# Patient Record
Sex: FEMALE | Race: WHITE | NOT HISPANIC OR LATINO | Employment: OTHER | URBAN - METROPOLITAN AREA
[De-identification: names, ages, dates, MRNs, and addresses within clinical notes are randomized per-mention and may not be internally consistent; named-entity substitution may affect disease eponyms.]

---

## 2017-07-26 ENCOUNTER — HOSPITAL ENCOUNTER (OUTPATIENT)
Dept: CARDIOLOGY | Facility: MEDICAL CENTER | Age: 62
End: 2017-07-26
Attending: INTERNAL MEDICINE
Payer: COMMERCIAL

## 2017-07-26 DIAGNOSIS — I49.8 SINUS ARRHYTHMIA: ICD-10-CM

## 2017-07-26 DIAGNOSIS — R07.9 CHEST PAIN, UNSPECIFIED: ICD-10-CM

## 2017-07-26 DIAGNOSIS — E78.5 HYPERLIPIDEMIA, UNSPECIFIED HYPERLIPIDEMIA TYPE: ICD-10-CM

## 2017-07-26 LAB — LV EJECT FRACT  99904: 55

## 2017-07-26 PROCEDURE — 93306 TTE W/DOPPLER COMPLETE: CPT | Mod: 26 | Performed by: INTERNAL MEDICINE

## 2017-07-26 PROCEDURE — 93306 TTE W/DOPPLER COMPLETE: CPT

## 2017-07-31 ENCOUNTER — HOSPITAL ENCOUNTER (OUTPATIENT)
Dept: RADIOLOGY | Facility: MEDICAL CENTER | Age: 62
End: 2017-07-31
Attending: INTERNAL MEDICINE
Payer: COMMERCIAL

## 2017-07-31 DIAGNOSIS — E78.5 HYPERLIPIDEMIA, UNSPECIFIED HYPERLIPIDEMIA TYPE: ICD-10-CM

## 2017-07-31 DIAGNOSIS — I49.8 SINUS ARRHYTHMIA: ICD-10-CM

## 2017-07-31 DIAGNOSIS — R07.9 CHEST PAIN, UNSPECIFIED: ICD-10-CM

## 2017-07-31 PROCEDURE — A9502 TC99M TETROFOSMIN: HCPCS

## 2017-07-31 PROCEDURE — 700111 HCHG RX REV CODE 636 W/ 250 OVERRIDE (IP)

## 2017-07-31 RX ORDER — REGADENOSON 0.08 MG/ML
INJECTION, SOLUTION INTRAVENOUS
Status: COMPLETED
Start: 2017-07-31 | End: 2017-07-31

## 2017-07-31 RX ADMIN — REGADENOSON 0.4 MG: 0.08 INJECTION, SOLUTION INTRAVENOUS at 10:22

## 2019-10-18 ENCOUNTER — TELEPHONE (OUTPATIENT)
Dept: SCHEDULING | Facility: IMAGING CENTER | Age: 64
End: 2019-10-18

## 2019-11-14 ENCOUNTER — HOSPITAL ENCOUNTER (OUTPATIENT)
Facility: MEDICAL CENTER | Age: 64
End: 2019-11-14
Attending: PHYSICIAN ASSISTANT
Payer: COMMERCIAL

## 2019-11-14 ENCOUNTER — OFFICE VISIT (OUTPATIENT)
Dept: URGENT CARE | Facility: CLINIC | Age: 64
End: 2019-11-14
Payer: COMMERCIAL

## 2019-11-14 VITALS
WEIGHT: 113 LBS | DIASTOLIC BLOOD PRESSURE: 60 MMHG | RESPIRATION RATE: 20 BRPM | HEART RATE: 104 BPM | OXYGEN SATURATION: 98 % | SYSTOLIC BLOOD PRESSURE: 116 MMHG | TEMPERATURE: 99.2 F

## 2019-11-14 DIAGNOSIS — G89.29 CHRONIC MIDLINE LOW BACK PAIN WITHOUT SCIATICA: ICD-10-CM

## 2019-11-14 DIAGNOSIS — M54.50 CHRONIC MIDLINE LOW BACK PAIN WITHOUT SCIATICA: ICD-10-CM

## 2019-11-14 DIAGNOSIS — R15.9 INCONTINENCE OF FECES, UNSPECIFIED FECAL INCONTINENCE TYPE: ICD-10-CM

## 2019-11-14 DIAGNOSIS — N30.10 INTERSTITIAL CYSTITIS: ICD-10-CM

## 2019-11-14 DIAGNOSIS — H91.91 HEARING LOSS OF RIGHT EAR, UNSPECIFIED HEARING LOSS TYPE: ICD-10-CM

## 2019-11-14 DIAGNOSIS — R26.89 BALANCE PROBLEMS: ICD-10-CM

## 2019-11-14 LAB
APPEARANCE UR: NORMAL
BILIRUB UR STRIP-MCNC: NORMAL MG/DL
COLOR UR AUTO: YELLOW
GLUCOSE UR STRIP.AUTO-MCNC: NORMAL MG/DL
KETONES UR STRIP.AUTO-MCNC: NORMAL MG/DL
LEUKOCYTE ESTERASE UR QL STRIP.AUTO: NORMAL
NITRITE UR QL STRIP.AUTO: NORMAL
PH UR STRIP.AUTO: 5.5 [PH] (ref 5–8)
PROT UR QL STRIP: NORMAL MG/DL
RBC UR QL AUTO: NORMAL
SP GR UR STRIP.AUTO: 1.01
UROBILINOGEN UR STRIP-MCNC: 0.2 MG/DL

## 2019-11-14 PROCEDURE — 87086 URINE CULTURE/COLONY COUNT: CPT

## 2019-11-14 PROCEDURE — 99204 OFFICE O/P NEW MOD 45 MIN: CPT | Performed by: PHYSICIAN ASSISTANT

## 2019-11-14 PROCEDURE — 81002 URINALYSIS NONAUTO W/O SCOPE: CPT | Performed by: PHYSICIAN ASSISTANT

## 2019-11-14 RX ORDER — CEFDINIR 300 MG/1
300 CAPSULE ORAL EVERY 12 HOURS
Qty: 10 CAP | Refills: 0 | Status: SHIPPED | OUTPATIENT
Start: 2019-11-14 | End: 2019-11-19

## 2019-11-14 ASSESSMENT — ENCOUNTER SYMPTOMS
FALLS: 1
ABDOMINAL PAIN: 0
CHILLS: 0
FOCAL WEAKNESS: 0
DIARRHEA: 0
TINGLING: 0
FLANK PAIN: 0
DIZZINESS: 1
VOMITING: 0
SENSORY CHANGE: 0
FEVER: 0
NAUSEA: 0
BLOOD IN STOOL: 0

## 2019-11-14 ASSESSMENT — PAIN SCALES - GENERAL: PAINLEVEL: 8=MODERATE-SEVERE PAIN

## 2019-11-15 NOTE — PATIENT INSTRUCTIONS
Fecal Incontinence  Introduction  Fecal incontinence, also called accidental bowel leakage, is not being able to control your bowels. This condition happens because the nerves or muscles around the anus do not work the way they should. This affects their ability to hold stool.  What are the causes?  This condition may be caused by:  · Damage to the muscles at the end of the rectum (sphincter).  · Damage to the nerves that control bowel movements.  · Diarrhea.  · Chronic constipation.  · Pelvic floor dysfunction. This means the muscles in the pelvis do not work well.  · Loss of bowel storage capacity.  What increases the risk?  This condition is more likely to develop in people who:  · Are born with bowels or a pelvis that has not formed correctly.  · Have had rectal surgery.  · Have had radiation treatment for certain cancers.  · Have irritable bowel syndrome (IBS).  · Have an inflammatory bowel disease (IBD), such as Crohn disease.  · Have been pregnant, had a vaginal delivery, or had surgery that damaged the pelvic floor muscles.  · Have a complicated childbirth, spinal cord injury, or other trauma that causes nerve damage.  · Have a condition that can affect nerve function, such as diabetes, Parkinson disease, or multiple sclerosis.  · Have a condition where the rectum drops down into the anus or vagina (prolapse).  · Are older.  What are the signs or symptoms?  The main symptom of this condition is not being able to control your bowels. You also might not be able get to the bathroom before a bowel movement.  How is this diagnosed?  This condition is diagnosed with a medical history and physical exam. You may also have tests, including:  · Blood tests.  · Urine tests.  · A rectal exam.  · Ultrasound.  · MRI.  · Colonoscopy. This is an exam that looks at your large intestine (colon).  · Anal manometry. This is a test that measures the strength of the anal sphincter.  · Anal electromyogram (EMG). This is a test  that uses small electrodes to check for nerve damage.  How is this treated?  Treatment varies depending on the cause and severity of your condition. Treatment may also focus on addressing any underlying causes of this condition. Treatment may include:  · Medicines. This may include medicines to:  ¨ Prevent diarrhea.  ¨ Help with constipation (laxatives).  ¨ Treat any underlying conditions.  · Physical therapy.  · Fiber supplements. These can help manage your bowel movements.  · Nerve stimulation.  · Injectable gel to promote tissue growth and better muscle control.  · Surgery. You may need:  ¨ Sphincter repair surgery.  ¨ Diversion surgery. This procedure lets feces pass out of your body through a hole in your abdomen.  Follow these instructions at home:  Diet  · Follow instructions from your health care provider about any eating or drinking restrictions. Work with a dietitian to come up with a healthy diet and to help you avoid the foods that can make your condition worse. Keep a diet diary to find out which foods or drinks could be making your fecal incontinence worse.  · Drink enough fluid to keep your urine clear or pale yellow.  Lifestyle  · If you smoke, talk to your health care provider about quitting. This may help your condition.  · If you are overweight, talk to your health care provider about how to safely lose weight. This may help your condition.  · Increase your physical activity as told by your health care provider. This may help your condition. Always talk to your health care provider before starting a new exercise program.  · Carry a change of clothes and supplies to clean up quickly if you have an episode of fetal incontinence.  · Consider joining a fecal incontinence support group. You can find a support group online or in your local community.  General instructions  · Take over-the-counter and prescription medicines only as told by your health care provider. This includes any  supplements.  · Apply a moisture barrier, such as petroleum jelly, to your rectum. This protects the skin from irritation caused by ongoing leaking or diarrhea.  · Tell your health care provider if you are upset or depressed about your condition.  Where to find more information:  American Academy [employer]: www.aafp.org  International Foundation for Functional Gastrointestinal Disorders: www.iffgd.org  Contact a health care provider if:  · You have a fever.  · You have redness, swelling, or pain around your rectum.  · Your pain is getting worse or you lose feeling in your rectal area.  · You have blood in your stool.  · You feel sad or hopeless.  · You avoid social or work situations.  Get help right away if:  · You stop having bowel movements.  · You cannot eat or drink without vomiting.  · You have rectal bleeding that does not stop.  · You have severe pain that is getting worse.  · You have symptoms of dehydration, including:  ¨ Sleepiness or fatigue.  ¨ Producing little or no urine, tears, or sweat.  ¨ Dizziness.  ¨ Dry mouth.  ¨ Unusual irritability.  ¨ Headache.  ¨ Inability to think clearly.  This information is not intended to replace advice given to you by your health care provider. Make sure you discuss any questions you have with your health care provider.  Document Released: 11/29/2005 Document Revised: 05/25/2017 Document Reviewed: 05/25/2016  © 2017 [employer]vier  Interstitial Cystitis  Introduction  Interstitial cystitis is a condition that causes inflammation of the bladder. The bladder is a hollow organ in the lower part of your abdomen. It stores urine after the urine is made by your kidneys. With interstitial cystitis, you may have pain in the bladder area. You may also have a frequent and urgent need to urinate.  The severity of interstitial cystitis can vary from person to person. You may have flare-ups of the condition, and then it may go away for a while. For many people who have this  condition, it becomes a long-term problem.  What are the causes?  The cause of this condition is not known.  What increases the risk?  This condition is more likely to develop in women.  What are the signs or symptoms?  Symptoms of interstitial cystitis vary, and they can change over time. Symptoms may include:  · Discomfort or pain in the bladder area. This can range from mild to severe. The pain may change in intensity as the bladder fills with urine or as it empties.  · Pelvic pain.  · An urgent need to urinate.  · Frequent urination.  · Pain during sexual intercourse.  · Pinpoint bleeding on the bladder wall.  For women, the symptoms often get worse during menstruation.  How is this diagnosed?  This condition is diagnosed by evaluating your symptoms and ruling out other causes. A physical exam will be done. Various tests may be done to rule out other conditions. Common tests include:  · Urine tests.  · Cystoscopy. In this test, a tool that is like a very thin telescope is used to look into your bladder.  · Biopsy. This involves taking a sample of tissue from the bladder wall to be examined under a microscope.  How is this treated?  There is no cure for interstitial cystitis, but treatment methods are available to control your symptoms. Work closely with your health care provider to find the treatments that will be most effective for you. Treatment options may include:  · Medicines to relieve pain and to help reduce the number of times that you feel the need to urinate.  · Bladder training. This involves learning ways to control when you urinate, such as:  ¨ Urinating at scheduled times.  ¨ Training yourself to delay urination.  ¨ Doing exercises (Kegel exercises) to strengthen the muscles that control urine flow.  · Lifestyle changes, such as changing your diet or taking steps to control stress.  · Use of a device that provides electrical stimulation in order to reduce pain.  · A procedure that stretches your  bladder by filling it with air or fluid.  · Surgery. This is rare. It is only done for extreme cases if other treatments do not help.  Follow these instructions at home:  · Take medicines only as directed by your health care provider.  · Use bladder training techniques as directed.  ¨ Keep a bladder diary to find out which foods, liquids, or activities make your symptoms worse.  ¨ Use your bladder diary to schedule bathroom trips. If you are away from home, plan to be near a bathroom at each of your scheduled times.  ¨ Make sure you urinate just before you leave the house and just before you go to bed.  · Do Kegel exercises as directed by your health care provider.  · Do not drink alcohol.  · Do not use any tobacco products, including cigarettes, chewing tobacco, or electronic cigarettes. If you need help quitting, ask your health care provider.  · Make dietary changes as directed by your health care provider. You may need to avoid spicy foods and foods that contain a high amount of potassium.  · Limit your drinking of beverages that stimulate urination. These include soda, coffee, and tea.  · Keep all follow-up visits as directed by your health care provider. This is important.  Contact a health care provider if:  · Your symptoms do not get better after treatment.  · Your pain and discomfort are getting worse.  · You have more frequent urges to urinate.  · You have a fever.  Get help right away if:  · You are not able to control your bladder at all.  This information is not intended to replace advice given to you by your health care provider. Make sure you discuss any questions you have with your health care provider.  Document Released: 08/18/2005 Document Revised: 05/25/2017 Document Reviewed: 08/25/2015  © 2017 Elsevier

## 2019-11-15 NOTE — PROGRESS NOTES
"Subjective:   Carlee Zavala is a 63 y.o. female who presents for Bowel Problem (started with right ear hearing loss, burning, leaking from her anus, going to the bathroom frequesnt, leakage worsening, bit her tongue x 05/2019(had issues with her insurance))  With constant leaking  This is a new problem.  Patient presents to urgent care with 8-month history of complete incontinence of bowel with constant leaking of fecal material and a sensation of something \"bulging from my rectum\".  Patient has not seen anyone for this problem.  She has attempted to schedule an appointment with primary care but is unable to get an appointment for several months.    The patient also mentions other multiple chronic conditions of concern including hearing loss in the right ear with significant balance issues present for the at least the past 8 years.  She has seen multiple providers in ear nose and throat specialist regarding this issue has had an MRI and has not had an identification of her problem.    Patient reports that she also went to see her GYN for this concern. Pap smear was obtained and she was told that they did not see anything on exam concerning.     The patient also reports multiple falls due to her to her balance issues.    Patient also is complaining of pain in the left side of her abdomen when lying on her left side during today's visit and when palpating the area.    Patient reports that she has had a change of health insurance and has been unable to establish with a primary care.    Patient reports a history of interstitial cystitis and states that her physician will treat her when needed with Keflex 500 mg 4 times a day for 7 days.  She does admit to an increase in dysuria with some urgency and frequency.  She denies any urinary incontinence.    Patient denies any numbness, tingling or weakness of the extremities.    Patient does have a history of chronic back pain.  However, she denies any numbness, tingling, " weakness of the extremities.  She is previously been treated with hydrocodone and methadone however she reports that she has weaned herself off of these medications.  She is taking tizanidine 4 mg every 6 hours and is wondering if this could be contributing to her symptoms today.    Past medical history, family history and social history are reviewed and updated in the record today.       HPI  Review of Systems   Constitutional: Negative for chills, fever and malaise/fatigue.   HENT: Positive for hearing loss and tinnitus.    Gastrointestinal: Negative for abdominal pain, blood in stool, diarrhea, melena, nausea and vomiting.   Genitourinary: Positive for dysuria. Negative for flank pain, frequency, hematuria and urgency.   Musculoskeletal: Positive for falls.   Neurological: Positive for dizziness. Negative for tingling, sensory change and focal weakness.   All other systems reviewed and are negative.    Not on File     Objective:   /60 (BP Location: Left arm, Patient Position: Sitting, BP Cuff Size: Adult)   Pulse (!) 104   Temp 37.3 °C (99.2 °F) (Temporal)   Resp 20   Wt 51.3 kg (113 lb)   SpO2 98%   Physical Exam  Vitals signs reviewed.   Constitutional:       General: She is not in acute distress.     Appearance: She is well-developed. She is not ill-appearing or toxic-appearing.   HENT:      Head: Normocephalic and atraumatic. No raccoon eyes, Jc's sign or contusion.      Right Ear: Tympanic membrane, ear canal and external ear normal. No hemotympanum.      Left Ear: Tympanic membrane, ear canal and external ear normal. No hemotympanum.      Nose: Nose normal.      Mouth/Throat:      Lips: Pink. No lesions.      Mouth: Mucous membranes are moist.      Pharynx: Oropharynx is clear. Uvula midline. No oropharyngeal exudate.   Eyes:      General: Lids are normal.      Extraocular Movements: Extraocular movements intact.      Conjunctiva/sclera: Conjunctivae normal.      Pupils: Pupils are equal,  round, and reactive to light.   Neck:      Musculoskeletal: Normal range of motion and neck supple.   Cardiovascular:      Rate and Rhythm: Normal rate and regular rhythm.      Heart sounds: Normal heart sounds. No murmur. No friction rub. No gallop.    Pulmonary:      Effort: Pulmonary effort is normal. No respiratory distress.      Breath sounds: Normal breath sounds.   Abdominal:      General: Bowel sounds are normal. There is no distension.      Palpations: Abdomen is soft. There is no mass.      Tenderness: There is no tenderness. There is no guarding or rebound.   Genitourinary:     Rectum: Guaiac result negative. External hemorrhoid present. No tenderness. Abnormal anal tone.      Comments: Anal wink absent, no anal tone on exam, no stool noted in vault.  Hemocult negative  Small external hemorrhoid noted, no rectal prolapse noted. No rectocele noted.   Musculoskeletal: Normal range of motion.         General: No tenderness or deformity.   Lymphadenopathy:      Head:      Right side of head: No submental, submandibular or tonsillar adenopathy.      Left side of head: No submental, submandibular or tonsillar adenopathy.      Cervical: No cervical adenopathy.      Upper Body:      Right upper body: No supraclavicular adenopathy.      Left upper body: No supraclavicular adenopathy.   Skin:     General: Skin is warm and dry.      Findings: No rash.   Neurological:      Mental Status: She is alert and oriented to person, place, and time.      Cranial Nerves: No cranial nerve deficit.      Sensory: No sensory deficit.      Motor: Motor function is intact.      Coordination: Romberg sign positive. Coordination normal.      Gait: Gait abnormal.   Psychiatric:         Attention and Perception: Attention normal.         Mood and Affect: Mood and affect normal.         Speech: Speech normal.         Behavior: Behavior normal. Behavior is cooperative.         Thought Content: Thought content normal.         Judgment:  Judgment normal.            Assessment/Plan:   Assessment    1. Incontinence of feces, unspecified fecal incontinence type  - REFERRAL TO GASTROENTEROLOGY  - REFERRAL TO FOLLOW-UP WITH PRIMARY CARE    2. Interstitial cystitis  - REFERRAL TO FOLLOW-UP WITH PRIMARY CARE  - cefdinir (OMNICEF) 300 MG Cap; Take 1 Cap by mouth every 12 hours for 5 days.  Dispense: 10 Cap; Refill: 0  - POCT Urinalysis  - URINE CULTURE(NEW); Future    3. Balance problems  - REFERRAL TO FOLLOW-UP WITH PRIMARY CARE    4. Hearing loss of right ear, unspecified hearing loss type  - REFERRAL TO FOLLOW-UP WITH PRIMARY CARE    5. Chronic midline low back pain without sciatica    Patient is lacking rectal tone.  Consideration could be given to possible issue related to her low back with her chronic back pain.  However she is not having any numbness, tingling or weakness of her extremities.  Referral was placed to gastroenterology and to primary care for follow-up for this chronic condition.    Urinalysis does have trace leukocyte esterase.  We will send for culture for confirmation and place patient on Omnicef pending culture and sensitivity.    Regarding patient's other chronic health conditions I have recommended that she follow-up with primary care, referral is placed.        Differential diagnosis, natural history, supportive care, and indications for immediate follow-up discussed.      Red flag warning symptoms and strict ER/follow-up precautions given.  The patient demonstrated a good understanding and agreed with the treatment plan.  Please note that this note was created using voice recognition speech to text software. Every effort has been made to correct obvious errors.  However, I expect there are errors of grammar and possibly context that were not discovered prior to finalizing the note  TATYANA Ewing PA-C

## 2019-11-17 LAB
BACTERIA UR CULT: NORMAL
SIGNIFICANT IND 70042: NORMAL
SITE SITE: NORMAL
SOURCE SOURCE: NORMAL

## 2019-11-22 ENCOUNTER — TELEPHONE (OUTPATIENT)
Dept: MEDICAL GROUP | Facility: LAB | Age: 64
End: 2019-11-22

## 2019-11-22 NOTE — TELEPHONE ENCOUNTER
Pt was seen in Urgent care and was referred to Gastroenterology. They are now sending her to a surgeon. She does not come in to see you until 12/12/19. Do you need to see her sooner? New PT.   She stated they have been faxing over all her notes. I do not see them in her chart.

## 2019-11-22 NOTE — TELEPHONE ENCOUNTER
No need. Nothing I can do to treat that from my end. I do see the gastro notes in media.      Thanks  Ashlee

## 2019-11-25 ENCOUNTER — TELEPHONE (OUTPATIENT)
Dept: URGENT CARE | Facility: CLINIC | Age: 64
End: 2019-11-25

## 2019-11-25 NOTE — TELEPHONE ENCOUNTER
----- Message from Pema Ewing P.A.-C. sent at 11/17/2019 11:07 AM PST -----  Regarding: Urine culture negative  Please contact the patient and notify urine culture is negative for urinary tract infection.  Given her history of interstitial cystitis typically responsive to antibiotic use, recommend completion of the Omnicef that was prescribed.  No change to plan at this time.  TATYANA Ewing PA-C

## 2019-11-27 ENCOUNTER — TELEPHONE (OUTPATIENT)
Dept: MEDICAL GROUP | Facility: LAB | Age: 64
End: 2019-11-27

## 2019-11-27 DIAGNOSIS — K62.3 RECTAL PROLAPSE: ICD-10-CM

## 2019-11-27 NOTE — TELEPHONE ENCOUNTER
"· Referral paperwork received from Dr Soliman from [facility] requesting a referral and a provider signature to be sent to Dr Palacio using ICD10 k62.3 and cpt 32892, 69701 with medical records attached.     · All appropriate fields completed by Medical Assistant: Yes    · Paperwork placed in \"MA to Provider\" folder/basket. Awaiting provider completion/signature.      "

## 2019-12-12 ENCOUNTER — OFFICE VISIT (OUTPATIENT)
Dept: MEDICAL GROUP | Facility: LAB | Age: 64
End: 2019-12-12
Payer: COMMERCIAL

## 2019-12-12 DIAGNOSIS — M81.0 AGE RELATED OSTEOPOROSIS, UNSPECIFIED PATHOLOGICAL FRACTURE PRESENCE: ICD-10-CM

## 2019-12-12 DIAGNOSIS — Z11.59 NEED FOR HEPATITIS C SCREENING TEST: ICD-10-CM

## 2019-12-12 DIAGNOSIS — A60.00 HERPES SIMPLEX INFECTION OF GENITOURINARY SYSTEM: ICD-10-CM

## 2019-12-12 DIAGNOSIS — R29.6 FREQUENT FALLS: ICD-10-CM

## 2019-12-12 DIAGNOSIS — Z79.899 CHRONIC USE OF BENZODIAZEPINE FOR THERAPEUTIC PURPOSE: ICD-10-CM

## 2019-12-12 DIAGNOSIS — R26.89 BALANCE PROBLEM: ICD-10-CM

## 2019-12-12 DIAGNOSIS — H90.5 SENSORINEURAL HEARING LOSS (SNHL) OF LEFT EAR, UNSPECIFIED HEARING STATUS ON CONTRALATERAL SIDE: ICD-10-CM

## 2019-12-12 DIAGNOSIS — Z12.39 ENCOUNTER FOR SCREENING FOR MALIGNANT NEOPLASM OF BREAST: ICD-10-CM

## 2019-12-12 PROBLEM — G43.009 MIGRAINE WITHOUT AURA AND WITHOUT STATUS MIGRAINOSUS, NOT INTRACTABLE: Status: ACTIVE | Noted: 2019-12-12

## 2019-12-12 PROCEDURE — 99214 OFFICE O/P EST MOD 30 MIN: CPT | Performed by: NURSE PRACTITIONER

## 2019-12-12 RX ORDER — SUMATRIPTAN 100 MG/1
100 TABLET, FILM COATED ORAL
COMMUNITY
Start: 2019-11-21

## 2019-12-12 RX ORDER — VALACYCLOVIR HYDROCHLORIDE 500 MG/1
500 TABLET, FILM COATED ORAL 2 TIMES DAILY
Status: ON HOLD | COMMUNITY
End: 2020-01-21

## 2019-12-12 RX ORDER — TIZANIDINE 4 MG/1
4 TABLET ORAL EVERY 6 HOURS PRN
COMMUNITY

## 2019-12-12 RX ORDER — ALPRAZOLAM 0.5 MG/1
0.5 TABLET ORAL 3 TIMES DAILY PRN
COMMUNITY

## 2019-12-12 NOTE — LETTER
[facility]  [facility] [place]  [id number]  [place] [address]  Fax: [phone]   Authorization for Release/Disclosure of   Protected Health Information   Name: CARLEE PRICE : 1955 SSN: [id number]   Address: [address] [place] [address] [place] [address] Phone:    [phone] (home)    I authorize the entity listed below to release/disclose the PHI below to:   [facility]PERCY and GREG [place]   Provider or Entity Name:     Address   City, State, Zip   Phone:      Fax:     Reason for request: continuity of care   Information to be released:    [  ] LAST COLONOSCOPY,  including any PATH REPORT and follow-up  [  ] LAST FIT/COLOGUARD RESULT [  ] LAST DEXA  [  ] LAST MAMMOGRAM  [  ] LAST PAP  [  ] LAST LABS [  ] RETINA EXAM REPORT  [  ] IMMUNIZATION RECORDS  [ x ] Release all info      [  ] Check here and initial the line next to each item to release ALL health information INCLUDING  _____ Care and treatment for drug and / or alcohol abuse  _____ HIV testing, infection status, or AIDS  _____ Genetic Testing    DATES OF SERVICE OR TIME PERIOD TO BE DISCLOSED: _____________  I understand and acknowledge that:  * This Authorization may be revoked at any time by you in writing, except if your health information has already been used or disclosed.  * Your health information that will be used or disclosed as a result of you signing this authorization could be re-disclosed by the recipient. If this occurs, your re-disclosed health information may no longer be protected by State or Federal laws.  * You may refuse to sign this Authorization. Your refusal will not affect your ability to obtain treatment.  * This Authorization becomes effective upon signing and will  on (date) __________.      If no date is indicated, this Authorization will  one (1) year from the signature date.    Name: Carlee Price    Signature:   Date:          12/12/2019       PLEASE FAX REQUESTED RECORDS BACK TO: [phone]

## 2019-12-12 NOTE — PROGRESS NOTES
"CC:Diagnoses of Frequent falls, Sensorineural hearing loss (SNHL) of left ear, unspecified hearing status on contralateral side, Encounter for screening for malignant neoplasm of breast, Age related osteoporosis, unspecified pathological fracture presence, Herpes simplex infection of genitourinary system, Balance problem, Need for hepatitis C screening test, and Chronic use of benzodiazepine for therapeutic purpose were pertinent to this visit.    HISTORY OF PRESENT ILLNESS: Carlee Zavala is a 64 y.o. female new to me patient who presents today with multiple medical complaints as described below:    Left-sided sensorineural hearing loss  Patient had sudden hearing loss Oct 2018 and was evaluated by ENT with negative MRI per patient. No improvemement since that time. Patient is also having balance issues as well. Request records for MRI from [facility].       Fecal incontinence  Patient has upcoming surgery with Dr. Palacio for rectal repair. 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 States she often has stool running down her leg.  Denies fevers, weight loss, melena, hematochezia.    Frequent falls  Patient reports frequent falls from \"losing my balance\" over the last 2 years. She reports that she has seen multiple providers, MRI was performed and nobody has been able to tell her why she is falling.     Chronic use of benzodiazepine for therapeutic purpose  Patient does wish for refill of her Xanax today however the history provided by patient is very inconsistent. I have told her I will request records from previous prescribing provider and patient states \"well he really won't have any records, I just go to him to get them filled so I am hesitant about you getting those records\".  She is very anxious in the office and difficult to redirect.  The  states she has been getting 90 tablets of 0.5 mg alprazolam monthly with last fill in November.  Previously was " getting as many as 180 tablets monthly.  She does have history of methadone use but reports she stopped taking that. I have notified patient I am not comfortable filling this prescription without records from previous provider. Patient verbalized understanding.      Health Maintenance: Completed    Patient Active Problem List    Diagnosis Date Noted   • Chronic use of benzodiazepine for therapeutic purpose 12/13/2019   • Migraine without aura and without status migrainosus, not intractable 12/12/2019   • Frequent falls 12/12/2019   • Left-sided sensorineural hearing loss 12/12/2019   • Herpes simplex infection of genitourinary system 12/12/2019   • Osteoporosis 07/11/2017        Allergies:Patient has no allergy information on record.    Current Outpatient Medications   Medication Sig Dispense Refill   • ALPRAZolam (XANAX) 0.5 MG Tab Take 0.5 mg by mouth at bedtime as needed for Sleep.     • tizanidine (ZANAFLEX) 4 MG Tab Take 4 mg by mouth every 6 hours as needed.     • valACYclovir (VALTREX) 500 MG Tab Take 500 mg by mouth 2 times a day.     • sumatriptan (IMITREX) 100 MG tablet Take 100 mg by mouth 1 time daily as needed.       No current facility-administered medications for this visit.        Social History     Tobacco Use   • Smoking status: Never Smoker   • Smokeless tobacco: Never Used   Substance Use Topics   • Alcohol use: Not Currently   • Drug use: Not Currently     Social History     Patient does not qualify to have social determinant information on file (likely too young).   Social History Narrative   • Not on file       Family History   Problem Relation Age of Onset   • Hypertension Mother    • Hypertension Father        ROS:     No fevers or weight loss  No headaches or sore throat  No chest pain or shortness of breath  No bowel changes  No lower extremity edema  No suicidal ideation or panic attack          EXAM:   /64 (BP Location: Left arm, Patient Position: Sitting, BP Cuff Size: Adult)    "Pulse (!) 102   Temp 36.9 °C (98.5 °F) (Temporal)   Ht 1.651 m (5' 5\")   Wt 50.8 kg (112 lb)   SpO2 95%  Body mass index is 18.64 kg/m².    Gen:         Alert and oriented, No apparent distress.  Neck:        No Lymphadenopathy or Bruits.  Lungs:     Clear to auscultation bilaterally  CV:          Regular rate and rhythm. No murmurs, rubs or gallops.               Ext:          No clubbing, cyanosis, edema.  Psych:     Anxious, tearful, easily distracted    .       ASSESSMENT/PLAN:    1. Frequent falls  New to me chronic. Per patient workup negative. ?benzo use?  Labs as indicated. F/U after upcoming surgery for further eval.  Request records from prior PCP  - CBC WITH DIFFERENTIAL; Future  - Comp Metabolic Panel; Future  - Lipid Profile; Future  - TSH; Future  - FREE THYROXINE; Future  - VITAMIN D,25 HYDROXY; Future  - VITAMIN B12; Future    2. Sensorineural hearing loss (SNHL) of left ear, unspecified hearing status on contralateral side  New to me chronic uncontrolled. Unknown etiology. Per patient negative workup. MRI records requested. ENT records requested.    3. Encounter for screening for malignant neoplasm of breast  - MA-SCREENING MAMMO BILAT W/TOMOSYNTHESIS W/CAD; Future    4. Age related osteoporosis, unspecified pathological fracture presence    - DS-BONE DENSITY STUDY (DEXA); Future      5. Balance problem  Chronic unknown etiology. Suspect benzo use may be contributing to falls/balance as patient has negative workup with MRI and Labs previously.  - CBC WITH DIFFERENTIAL; Future  - Comp Metabolic Panel; Future  - Lipid Profile; Future  - TSH; Future  - FREE THYROXINE; Future  - VITAMIN D,25 HYDROXY; Future  - VITAMIN B12; Future    7. Need for hepatitis C screening test  - HEP C VIRUS ANTIBODY; Future    8. Chronic use of benzodiazepine for therapeutic purpose  Please see HPI.  I have discussed that I Will not fill her RX without records from previous PCP.  Suspect chronic use of benzos " contributing to her falls/balance problems.   Will need millennial screen and records prior to any refills if records are consistent.    Return in about 3 months (around 3/12/2020) for Routine Follow up.       Please note that this dictation was created using voice recognition software. I have made every reasonable attempt to correct obvious errors, but I expect that there are errors of grammar and possibly content that I did not discover before finalizing the note.

## 2019-12-12 NOTE — LETTER
[facility]  [facility] [place]  [id number]  [place] [address]  Fax: [phone]   Authorization for Release/Disclosure of   Protected Health Information   Name: CARLEE PRICE : 1955 SSN: [id number]   Address: [address] [place] [address] [address] [place] [address] Phone:    [phone] (home)    I authorize the entity listed below to release/disclose the PHI below to:   [facility]CY and GREG [place]   Provider or Entity Name:     Address   City, State, Zip   Phone:      Fax:     Reason for request: continuity of care   Information to be released:    [  ] LAST COLONOSCOPY,  including any PATH REPORT and follow-up  [  ] LAST FIT/COLOGUARD RESULT [  ] LAST DEXA  [  ] LAST MAMMOGRAM  [  ] LAST PAP  [  ] LAST LABS [  ] RETINA EXAM REPORT  [  ] IMMUNIZATION RECORDS  [ x ] Release all info      [  ] Check here and initial the line next to each item to release ALL health information INCLUDING  _____ Care and treatment for drug and / or alcohol abuse  _____ HIV testing, infection status, or AIDS  _____ Genetic Testing    DATES OF SERVICE OR TIME PERIOD TO BE DISCLOSED: _____________  I understand and acknowledge that:  * This Authorization may be revoked at any time by you in writing, except if your health information has already been used or disclosed.  * Your health information that will be used or disclosed as a result of you signing this authorization could be re-disclosed by the recipient. If this occurs, your re-disclosed health information may no longer be protected by State or Federal laws.  * You may refuse to sign this Authorization. Your refusal will not affect your ability to obtain treatment.  * This Authorization becomes effective upon signing and will  on (date) __________.      If no date is indicated, this Authorization will  one (1) year from the signature date.    Name: Carlee Price    Signature:   Date:          12/12/2019       PLEASE FAX REQUESTED RECORDS BACK TO: [phone]

## 2019-12-12 NOTE — ASSESSMENT & PLAN NOTE
Patient had sudden hearing loss Oct 2018 and was evaluated by ENT with negative MRI per patient. No improvemement since that time. Patient is also having balance issues as well. Request records for MRI from [facility].

## 2019-12-13 VITALS
SYSTOLIC BLOOD PRESSURE: 138 MMHG | WEIGHT: 112 LBS | OXYGEN SATURATION: 95 % | HEART RATE: 102 BPM | BODY MASS INDEX: 18.66 KG/M2 | HEIGHT: 65 IN | DIASTOLIC BLOOD PRESSURE: 64 MMHG | TEMPERATURE: 98.5 F

## 2019-12-13 PROBLEM — Z79.899 CHRONIC USE OF BENZODIAZEPINE FOR THERAPEUTIC PURPOSE: Status: ACTIVE | Noted: 2019-12-13

## 2019-12-14 NOTE — ASSESSMENT & PLAN NOTE
"Patient reports frequent falls from \"losing my balance\" over the last 2 years. She reports that she has seen multiple providers, MRI was performed and nobody has been able to tell her why she is falling.   "

## 2019-12-14 NOTE — ASSESSMENT & PLAN NOTE
"Patient does wish for refill of her Xanax today however the history provided by patient is very inconsistent. I have told her I will request records from previous prescribing provider and patient states \"well he really won't have any records, I just go to him to get them filled so I am hesitant about you getting those records\".  She is very anxious in the office and difficult to redirect.  The  states she has been getting 90 tablets of 0.5 mg alprazolam monthly with last fill in November.  Previously was getting as many as 180 tablets monthly.  She does have history of methadone use but reports she stopped taking that. I have notified patient I am not comfortable filling this prescription without records from previous provider. Patient verbalized understanding.  "

## 2020-01-13 DIAGNOSIS — Z01.812 PRE-OPERATIVE LABORATORY EXAMINATION: ICD-10-CM

## 2020-01-13 DIAGNOSIS — Z01.810 PRE-OPERATIVE CARDIOVASCULAR EXAMINATION: ICD-10-CM

## 2020-01-13 LAB
ANION GAP SERPL CALC-SCNC: 10 MMOL/L (ref 0–11.9)
BUN SERPL-MCNC: 13 MG/DL (ref 8–22)
CALCIUM SERPL-MCNC: 9.1 MG/DL (ref 8.5–10.5)
CHLORIDE SERPL-SCNC: 103 MMOL/L (ref 96–112)
CO2 SERPL-SCNC: 26 MMOL/L (ref 20–33)
CREAT SERPL-MCNC: 1.02 MG/DL (ref 0.5–1.4)
ERYTHROCYTE [DISTWIDTH] IN BLOOD BY AUTOMATED COUNT: 47.1 FL (ref 35.9–50)
GLUCOSE SERPL-MCNC: 103 MG/DL (ref 65–99)
HCT VFR BLD AUTO: 40.7 % (ref 37–47)
HGB BLD-MCNC: 13.3 G/DL (ref 12–16)
MCH RBC QN AUTO: 33.8 PG (ref 27–33)
MCHC RBC AUTO-ENTMCNC: 32.7 G/DL (ref 33.6–35)
MCV RBC AUTO: 103.6 FL (ref 81.4–97.8)
PLATELET # BLD AUTO: 162 K/UL (ref 164–446)
PMV BLD AUTO: 11.7 FL (ref 9–12.9)
POTASSIUM SERPL-SCNC: 3.5 MMOL/L (ref 3.6–5.5)
RBC # BLD AUTO: 3.93 M/UL (ref 4.2–5.4)
SODIUM SERPL-SCNC: 139 MMOL/L (ref 135–145)
WBC # BLD AUTO: 7.6 K/UL (ref 4.8–10.8)

## 2020-01-13 PROCEDURE — 93005 ELECTROCARDIOGRAM TRACING: CPT

## 2020-01-13 PROCEDURE — 85027 COMPLETE CBC AUTOMATED: CPT

## 2020-01-13 PROCEDURE — 36415 COLL VENOUS BLD VENIPUNCTURE: CPT

## 2020-01-13 PROCEDURE — 80048 BASIC METABOLIC PNL TOTAL CA: CPT

## 2020-01-14 LAB — EKG IMPRESSION: NORMAL

## 2020-01-14 PROCEDURE — 93010 ELECTROCARDIOGRAM REPORT: CPT | Performed by: INTERNAL MEDICINE

## 2020-01-20 ENCOUNTER — ANESTHESIA EVENT (OUTPATIENT)
Dept: SURGERY | Facility: MEDICAL CENTER | Age: 65
DRG: 330 | End: 2020-01-20
Payer: COMMERCIAL

## 2020-01-21 ENCOUNTER — HOSPITAL ENCOUNTER (INPATIENT)
Facility: MEDICAL CENTER | Age: 65
LOS: 2 days | DRG: 330 | End: 2020-01-23
Attending: SURGERY | Admitting: SURGERY
Payer: COMMERCIAL

## 2020-01-21 ENCOUNTER — ANESTHESIA (OUTPATIENT)
Dept: SURGERY | Facility: MEDICAL CENTER | Age: 65
DRG: 330 | End: 2020-01-21
Payer: COMMERCIAL

## 2020-01-21 DIAGNOSIS — G89.18 POST-OP PAIN: ICD-10-CM

## 2020-01-21 PROBLEM — G89.29 CHRONIC BACK PAIN: Status: ACTIVE | Noted: 2020-01-21

## 2020-01-21 PROBLEM — M54.9 CHRONIC BACK PAIN: Status: ACTIVE | Noted: 2020-01-21

## 2020-01-21 LAB — PATHOLOGY CONSULT NOTE: NORMAL

## 2020-01-21 PROCEDURE — A9270 NON-COVERED ITEM OR SERVICE: HCPCS | Performed by: ANESTHESIOLOGY

## 2020-01-21 PROCEDURE — 0DSP4ZZ REPOSITION RECTUM, PERCUTANEOUS ENDOSCOPIC APPROACH: ICD-10-PCS | Performed by: SURGERY

## 2020-01-21 PROCEDURE — 160002 HCHG RECOVERY MINUTES (STAT): Performed by: SURGERY

## 2020-01-21 PROCEDURE — 501571 HCHG TROCAR, SEPARATOR 12X100: Performed by: SURGERY

## 2020-01-21 PROCEDURE — 700104 HCHG RX REV CODE 254: Performed by: ANESTHESIOLOGY

## 2020-01-21 PROCEDURE — 700105 HCHG RX REV CODE 258: Performed by: SURGERY

## 2020-01-21 PROCEDURE — 700101 HCHG RX REV CODE 250: Performed by: ANESTHESIOLOGY

## 2020-01-21 PROCEDURE — 502714 HCHG ROBOTIC SURGERY SERVICES: Performed by: SURGERY

## 2020-01-21 PROCEDURE — 700102 HCHG RX REV CODE 250 W/ 637 OVERRIDE(OP): Performed by: ANESTHESIOLOGY

## 2020-01-21 PROCEDURE — 500378 HCHG DRAIN, J-VAC ROUND 19FR: Performed by: SURGERY

## 2020-01-21 PROCEDURE — 770006 HCHG ROOM/CARE - MED/SURG/GYN SEMI*

## 2020-01-21 PROCEDURE — 0DBP0ZZ EXCISION OF RECTUM, OPEN APPROACH: ICD-10-PCS | Performed by: SURGERY

## 2020-01-21 PROCEDURE — 64486 TAP BLOCK UNIL BY INJECTION: CPT | Performed by: SURGERY

## 2020-01-21 PROCEDURE — 700111 HCHG RX REV CODE 636 W/ 250 OVERRIDE (IP): Performed by: ANESTHESIOLOGY

## 2020-01-21 PROCEDURE — 8E0W4CZ ROBOTIC ASSISTED PROCEDURE OF TRUNK REGION, PERCUTANEOUS ENDOSCOPIC APPROACH: ICD-10-PCS | Performed by: SURGERY

## 2020-01-21 PROCEDURE — A9270 NON-COVERED ITEM OR SERVICE: HCPCS | Performed by: SURGERY

## 2020-01-21 PROCEDURE — 501838 HCHG SUTURE GENERAL: Performed by: SURGERY

## 2020-01-21 PROCEDURE — 160042 HCHG SURGERY MINUTES - EA ADDL 1 MIN LEVEL 5: Performed by: SURGERY

## 2020-01-21 PROCEDURE — 700111 HCHG RX REV CODE 636 W/ 250 OVERRIDE (IP): Performed by: SURGERY

## 2020-01-21 PROCEDURE — 501570 HCHG TROCAR, SEPARATOR: Performed by: SURGERY

## 2020-01-21 PROCEDURE — 160031 HCHG SURGERY MINUTES - 1ST 30 MINS LEVEL 5: Performed by: SURGERY

## 2020-01-21 PROCEDURE — 502240 HCHG MISC OR SUPPLY RC 0272: Performed by: SURGERY

## 2020-01-21 PROCEDURE — 4A1BXSH MONITORING OF GASTROINTESTINAL VASCULAR PERFUSION USING INDOCYANINE GREEN DYE, EXTERNAL APPROACH: ICD-10-PCS | Performed by: SURGERY

## 2020-01-21 PROCEDURE — 160009 HCHG ANES TIME/MIN: Performed by: SURGERY

## 2020-01-21 PROCEDURE — 0DTN0ZZ RESECTION OF SIGMOID COLON, OPEN APPROACH: ICD-10-PCS | Performed by: SURGERY

## 2020-01-21 PROCEDURE — 500515 HCHG ENDOCLOSE SUTURING DEVICE: Performed by: SURGERY

## 2020-01-21 PROCEDURE — 88307 TISSUE EXAM BY PATHOLOGIST: CPT

## 2020-01-21 PROCEDURE — A4314 CATH W/DRAINAGE 2-WAY LATEX: HCPCS | Performed by: SURGERY

## 2020-01-21 PROCEDURE — 700102 HCHG RX REV CODE 250 W/ 637 OVERRIDE(OP): Performed by: SURGERY

## 2020-01-21 PROCEDURE — 160035 HCHG PACU - 1ST 60 MINS PHASE I: Performed by: SURGERY

## 2020-01-21 PROCEDURE — 700101 HCHG RX REV CODE 250: Performed by: SURGERY

## 2020-01-21 PROCEDURE — 160036 HCHG PACU - EA ADDL 30 MINS PHASE I: Performed by: SURGERY

## 2020-01-21 PROCEDURE — 700111 HCHG RX REV CODE 636 W/ 250 OVERRIDE (IP)

## 2020-01-21 PROCEDURE — A6402 STERILE GAUZE <= 16 SQ IN: HCPCS | Performed by: SURGERY

## 2020-01-21 PROCEDURE — 160048 HCHG OR STATISTICAL LEVEL 1-5: Performed by: SURGERY

## 2020-01-21 PROCEDURE — 500389 HCHG DRAIN, RESERVOIR SUCT JP 100CC: Performed by: SURGERY

## 2020-01-21 RX ORDER — LIDOCAINE HYDROCHLORIDE 10 MG/ML
INJECTION, SOLUTION EPIDURAL; INFILTRATION; INTRACAUDAL; PERINEURAL
Status: COMPLETED
Start: 2020-01-21 | End: 2020-01-21

## 2020-01-21 RX ORDER — ACETAMINOPHEN 500 MG
1000 TABLET ORAL EVERY 6 HOURS
Status: DISCONTINUED | OUTPATIENT
Start: 2020-01-21 | End: 2020-01-23 | Stop reason: HOSPADM

## 2020-01-21 RX ORDER — HYDRALAZINE HYDROCHLORIDE 20 MG/ML
5 INJECTION INTRAMUSCULAR; INTRAVENOUS
Status: DISCONTINUED | OUTPATIENT
Start: 2020-01-21 | End: 2020-01-21 | Stop reason: HOSPADM

## 2020-01-21 RX ORDER — DIPHENHYDRAMINE HYDROCHLORIDE 50 MG/ML
25 INJECTION INTRAMUSCULAR; INTRAVENOUS EVERY 6 HOURS PRN
Status: DISCONTINUED | OUTPATIENT
Start: 2020-01-21 | End: 2020-01-23 | Stop reason: HOSPADM

## 2020-01-21 RX ORDER — ONDANSETRON 2 MG/ML
4 INJECTION INTRAMUSCULAR; INTRAVENOUS EVERY 4 HOURS PRN
Status: DISCONTINUED | OUTPATIENT
Start: 2020-01-21 | End: 2020-01-22

## 2020-01-21 RX ORDER — SCOLOPAMINE TRANSDERMAL SYSTEM 1 MG/1
1 PATCH, EXTENDED RELEASE TRANSDERMAL
Status: DISCONTINUED | OUTPATIENT
Start: 2020-01-21 | End: 2020-01-23 | Stop reason: HOSPADM

## 2020-01-21 RX ORDER — LIDOCAINE HYDROCHLORIDE 20 MG/ML
INJECTION, SOLUTION EPIDURAL; INFILTRATION; INTRACAUDAL; PERINEURAL PRN
Status: DISCONTINUED | OUTPATIENT
Start: 2020-01-21 | End: 2020-01-21 | Stop reason: SURG

## 2020-01-21 RX ORDER — OXYCODONE HYDROCHLORIDE 10 MG/1
10 TABLET ORAL
Status: DISCONTINUED | OUTPATIENT
Start: 2020-01-21 | End: 2020-01-23 | Stop reason: HOSPADM

## 2020-01-21 RX ORDER — INDOCYANINE GREEN AND WATER 25 MG
KIT INJECTION PRN
Status: DISCONTINUED | OUTPATIENT
Start: 2020-01-21 | End: 2020-01-21 | Stop reason: SURG

## 2020-01-21 RX ORDER — OXYCODONE HYDROCHLORIDE 5 MG/1
5 TABLET ORAL
Status: DISCONTINUED | OUTPATIENT
Start: 2020-01-21 | End: 2020-01-23 | Stop reason: HOSPADM

## 2020-01-21 RX ORDER — HYDROMORPHONE HYDROCHLORIDE 1 MG/ML
0.4 INJECTION, SOLUTION INTRAMUSCULAR; INTRAVENOUS; SUBCUTANEOUS
Status: DISCONTINUED | OUTPATIENT
Start: 2020-01-21 | End: 2020-01-21 | Stop reason: HOSPADM

## 2020-01-21 RX ORDER — SUMATRIPTAN 50 MG/1
100 TABLET, FILM COATED ORAL
Status: DISCONTINUED | OUTPATIENT
Start: 2020-01-21 | End: 2020-01-23 | Stop reason: HOSPADM

## 2020-01-21 RX ORDER — TIZANIDINE 4 MG/1
4 TABLET ORAL EVERY 6 HOURS PRN
Status: DISCONTINUED | OUTPATIENT
Start: 2020-01-21 | End: 2020-01-23 | Stop reason: HOSPADM

## 2020-01-21 RX ORDER — MEPERIDINE HYDROCHLORIDE 25 MG/ML
12.5 INJECTION INTRAMUSCULAR; INTRAVENOUS; SUBCUTANEOUS
Status: DISCONTINUED | OUTPATIENT
Start: 2020-01-21 | End: 2020-01-21 | Stop reason: HOSPADM

## 2020-01-21 RX ORDER — MORPHINE SULFATE 4 MG/ML
4 INJECTION, SOLUTION INTRAMUSCULAR; INTRAVENOUS
Status: DISCONTINUED | OUTPATIENT
Start: 2020-01-21 | End: 2020-01-22

## 2020-01-21 RX ORDER — SODIUM CHLORIDE, SODIUM LACTATE, POTASSIUM CHLORIDE, CALCIUM CHLORIDE 600; 310; 30; 20 MG/100ML; MG/100ML; MG/100ML; MG/100ML
INJECTION, SOLUTION INTRAVENOUS CONTINUOUS
Status: ACTIVE | OUTPATIENT
Start: 2020-01-21 | End: 2020-01-21

## 2020-01-21 RX ORDER — SODIUM CHLORIDE, SODIUM LACTATE, POTASSIUM CHLORIDE, CALCIUM CHLORIDE 600; 310; 30; 20 MG/100ML; MG/100ML; MG/100ML; MG/100ML
INJECTION, SOLUTION INTRAVENOUS CONTINUOUS
Status: DISPENSED | OUTPATIENT
Start: 2020-01-21 | End: 2020-01-21

## 2020-01-21 RX ORDER — BUPIVACAINE HYDROCHLORIDE AND EPINEPHRINE 2.5; 5 MG/ML; UG/ML
INJECTION, SOLUTION EPIDURAL; INFILTRATION; INTRACAUDAL; PERINEURAL
Status: COMPLETED | OUTPATIENT
Start: 2020-01-21 | End: 2020-01-21

## 2020-01-21 RX ORDER — KETOROLAC TROMETHAMINE 30 MG/ML
15 INJECTION, SOLUTION INTRAMUSCULAR; INTRAVENOUS EVERY 6 HOURS
Status: DISCONTINUED | OUTPATIENT
Start: 2020-01-21 | End: 2020-01-23 | Stop reason: HOSPADM

## 2020-01-21 RX ORDER — DEXAMETHASONE SODIUM PHOSPHATE 4 MG/ML
INJECTION, SOLUTION INTRA-ARTICULAR; INTRALESIONAL; INTRAMUSCULAR; INTRAVENOUS; SOFT TISSUE PRN
Status: DISCONTINUED | OUTPATIENT
Start: 2020-01-21 | End: 2020-01-21 | Stop reason: SURG

## 2020-01-21 RX ORDER — NEOMYCIN SULFATE 500 MG/1
1000 TABLET ORAL 3 TIMES DAILY
Status: ON HOLD | COMMUNITY
Start: 2019-12-12 | End: 2020-01-23

## 2020-01-21 RX ORDER — SODIUM CHLORIDE, SODIUM LACTATE, POTASSIUM CHLORIDE, CALCIUM CHLORIDE 600; 310; 30; 20 MG/100ML; MG/100ML; MG/100ML; MG/100ML
INJECTION, SOLUTION INTRAVENOUS CONTINUOUS
Status: DISCONTINUED | OUTPATIENT
Start: 2020-01-21 | End: 2020-01-21 | Stop reason: HOSPADM

## 2020-01-21 RX ORDER — HALOPERIDOL 5 MG/ML
1 INJECTION INTRAMUSCULAR EVERY 6 HOURS PRN
Status: DISCONTINUED | OUTPATIENT
Start: 2020-01-21 | End: 2020-01-23 | Stop reason: HOSPADM

## 2020-01-21 RX ORDER — ONDANSETRON 2 MG/ML
4 INJECTION INTRAMUSCULAR; INTRAVENOUS
Status: COMPLETED | OUTPATIENT
Start: 2020-01-21 | End: 2020-01-21

## 2020-01-21 RX ORDER — HYDROMORPHONE HYDROCHLORIDE 1 MG/ML
0.2 INJECTION, SOLUTION INTRAMUSCULAR; INTRAVENOUS; SUBCUTANEOUS
Status: DISCONTINUED | OUTPATIENT
Start: 2020-01-21 | End: 2020-01-21 | Stop reason: HOSPADM

## 2020-01-21 RX ORDER — OXYCODONE HCL 5 MG/5 ML
5 SOLUTION, ORAL ORAL
Status: COMPLETED | OUTPATIENT
Start: 2020-01-21 | End: 2020-01-21

## 2020-01-21 RX ORDER — ALPRAZOLAM 0.25 MG/1
0.5 TABLET ORAL 3 TIMES DAILY PRN
Status: DISCONTINUED | OUTPATIENT
Start: 2020-01-21 | End: 2020-01-23 | Stop reason: HOSPADM

## 2020-01-21 RX ORDER — METRONIDAZOLE 500 MG/1
500 TABLET ORAL 3 TIMES DAILY
Status: ON HOLD | COMMUNITY
Start: 2019-12-12 | End: 2020-01-23

## 2020-01-21 RX ORDER — OXYCODONE HCL 5 MG/5 ML
10 SOLUTION, ORAL ORAL
Status: COMPLETED | OUTPATIENT
Start: 2020-01-21 | End: 2020-01-21

## 2020-01-21 RX ORDER — CEFAZOLIN SODIUM 1 G/3ML
INJECTION, POWDER, FOR SOLUTION INTRAMUSCULAR; INTRAVENOUS PRN
Status: DISCONTINUED | OUTPATIENT
Start: 2020-01-21 | End: 2020-01-21 | Stop reason: SURG

## 2020-01-21 RX ORDER — ACETAMINOPHEN 500 MG
1000 TABLET ORAL ONCE
Status: COMPLETED | OUTPATIENT
Start: 2020-01-21 | End: 2020-01-21

## 2020-01-21 RX ORDER — HALOPERIDOL 5 MG/ML
1 INJECTION INTRAMUSCULAR
Status: DISCONTINUED | OUTPATIENT
Start: 2020-01-21 | End: 2020-01-21 | Stop reason: HOSPADM

## 2020-01-21 RX ORDER — CALCIUM CARBONATE 500 MG/1
500 TABLET, CHEWABLE ORAL
Status: DISCONTINUED | OUTPATIENT
Start: 2020-01-21 | End: 2020-01-23 | Stop reason: HOSPADM

## 2020-01-21 RX ORDER — DEXAMETHASONE SODIUM PHOSPHATE 4 MG/ML
4 INJECTION, SOLUTION INTRA-ARTICULAR; INTRALESIONAL; INTRAMUSCULAR; INTRAVENOUS; SOFT TISSUE
Status: DISCONTINUED | OUTPATIENT
Start: 2020-01-21 | End: 2020-01-23 | Stop reason: HOSPADM

## 2020-01-21 RX ORDER — GABAPENTIN 300 MG/1
300 CAPSULE ORAL ONCE
Status: COMPLETED | OUTPATIENT
Start: 2020-01-21 | End: 2020-01-21

## 2020-01-21 RX ORDER — BUPIVACAINE HYDROCHLORIDE AND EPINEPHRINE 5; 5 MG/ML; UG/ML
INJECTION, SOLUTION EPIDURAL; INTRACAUDAL; PERINEURAL
Status: DISCONTINUED | OUTPATIENT
Start: 2020-01-21 | End: 2020-01-21 | Stop reason: HOSPADM

## 2020-01-21 RX ORDER — HYDROMORPHONE HYDROCHLORIDE 1 MG/ML
0.1 INJECTION, SOLUTION INTRAMUSCULAR; INTRAVENOUS; SUBCUTANEOUS
Status: DISCONTINUED | OUTPATIENT
Start: 2020-01-21 | End: 2020-01-21 | Stop reason: HOSPADM

## 2020-01-21 RX ORDER — PHENYLEPHRINE HYDROCHLORIDE 10 MG/ML
INJECTION, SOLUTION INTRAMUSCULAR; INTRAVENOUS; SUBCUTANEOUS PRN
Status: DISCONTINUED | OUTPATIENT
Start: 2020-01-21 | End: 2020-01-21 | Stop reason: SURG

## 2020-01-21 RX ORDER — DIPHENHYDRAMINE HYDROCHLORIDE 50 MG/ML
12.5 INJECTION INTRAMUSCULAR; INTRAVENOUS
Status: DISCONTINUED | OUTPATIENT
Start: 2020-01-21 | End: 2020-01-21 | Stop reason: HOSPADM

## 2020-01-21 RX ORDER — TRAZODONE HYDROCHLORIDE 50 MG/1
50 TABLET ORAL NIGHTLY PRN
Status: DISCONTINUED | OUTPATIENT
Start: 2020-01-21 | End: 2020-01-23 | Stop reason: HOSPADM

## 2020-01-21 RX ORDER — ONDANSETRON 2 MG/ML
INJECTION INTRAMUSCULAR; INTRAVENOUS PRN
Status: DISCONTINUED | OUTPATIENT
Start: 2020-01-21 | End: 2020-01-21 | Stop reason: SURG

## 2020-01-21 RX ORDER — ROCURONIUM BROMIDE 10 MG/ML
INJECTION, SOLUTION INTRAVENOUS PRN
Status: DISCONTINUED | OUTPATIENT
Start: 2020-01-21 | End: 2020-01-21 | Stop reason: SURG

## 2020-01-21 RX ORDER — LABETALOL HYDROCHLORIDE 5 MG/ML
5 INJECTION, SOLUTION INTRAVENOUS
Status: DISCONTINUED | OUTPATIENT
Start: 2020-01-21 | End: 2020-01-21 | Stop reason: HOSPADM

## 2020-01-21 RX ADMIN — HALOPERIDOL LACTATE 1 MG: 5 INJECTION, SOLUTION INTRAMUSCULAR at 10:27

## 2020-01-21 RX ADMIN — SODIUM CHLORIDE, POTASSIUM CHLORIDE, SODIUM LACTATE AND CALCIUM CHLORIDE: 600; 310; 30; 20 INJECTION, SOLUTION INTRAVENOUS at 10:30

## 2020-01-21 RX ADMIN — KETOROLAC TROMETHAMINE 15 MG: 30 INJECTION, SOLUTION INTRAMUSCULAR at 17:46

## 2020-01-21 RX ADMIN — LIDOCAINE HYDROCHLORIDE 0.5 ML: 10 INJECTION, SOLUTION EPIDURAL; INFILTRATION; INTRACAUDAL at 07:00

## 2020-01-21 RX ADMIN — KETOROLAC TROMETHAMINE 15 MG: 30 INJECTION, SOLUTION INTRAMUSCULAR at 23:33

## 2020-01-21 RX ADMIN — GABAPENTIN 300 MG: 300 CAPSULE ORAL at 07:01

## 2020-01-21 RX ADMIN — ACETAMINOPHEN 1000 MG: 500 TABLET, FILM COATED ORAL at 07:00

## 2020-01-21 RX ADMIN — FENTANYL CITRATE 50 MCG: 50 INJECTION, SOLUTION INTRAMUSCULAR; INTRAVENOUS at 09:29

## 2020-01-21 RX ADMIN — HYDROMORPHONE HYDROCHLORIDE 0.2 MG: 1 INJECTION, SOLUTION INTRAMUSCULAR; INTRAVENOUS; SUBCUTANEOUS at 10:36

## 2020-01-21 RX ADMIN — MEPERIDINE HYDROCHLORIDE 12.5 MG: 25 INJECTION INTRAMUSCULAR; INTRAVENOUS; SUBCUTANEOUS at 09:56

## 2020-01-21 RX ADMIN — FENTANYL CITRATE 50 MCG: 50 INJECTION, SOLUTION INTRAMUSCULAR; INTRAVENOUS at 09:12

## 2020-01-21 RX ADMIN — FENTANYL CITRATE 50 MCG: 0.05 INJECTION, SOLUTION INTRAMUSCULAR; INTRAVENOUS at 10:18

## 2020-01-21 RX ADMIN — MEPERIDINE HYDROCHLORIDE 12.5 MG: 25 INJECTION INTRAMUSCULAR; INTRAVENOUS; SUBCUTANEOUS at 10:02

## 2020-01-21 RX ADMIN — KETOROLAC TROMETHAMINE 15 MG: 30 INJECTION, SOLUTION INTRAMUSCULAR at 13:46

## 2020-01-21 RX ADMIN — SUGAMMADEX 200 MG: 100 INJECTION, SOLUTION INTRAVENOUS at 09:23

## 2020-01-21 RX ADMIN — PHENYLEPHRINE HYDROCHLORIDE 200 MCG: 10 INJECTION INTRAVENOUS at 07:57

## 2020-01-21 RX ADMIN — ROCURONIUM BROMIDE 50 MG: 10 INJECTION, SOLUTION INTRAVENOUS at 07:39

## 2020-01-21 RX ADMIN — MORPHINE SULFATE 4 MG: 4 INJECTION INTRAVENOUS at 13:55

## 2020-01-21 RX ADMIN — Medication 0.5 ML: at 07:00

## 2020-01-21 RX ADMIN — BUPIVACAINE HYDROCHLORIDE AND EPINEPHRINE 30 ML: 2.5; 5 INJECTION, SOLUTION EPIDURAL; INFILTRATION; INTRACAUDAL; PERINEURAL at 07:49

## 2020-01-21 RX ADMIN — PROPOFOL 150 MG: 10 INJECTION, EMULSION INTRAVENOUS at 07:38

## 2020-01-21 RX ADMIN — FENTANYL CITRATE 50 MCG: 50 INJECTION, SOLUTION INTRAMUSCULAR; INTRAVENOUS at 07:37

## 2020-01-21 RX ADMIN — FENTANYL CITRATE 50 MCG: 0.05 INJECTION, SOLUTION INTRAMUSCULAR; INTRAVENOUS at 10:14

## 2020-01-21 RX ADMIN — ONDANSETRON 4 MG: 2 INJECTION INTRAMUSCULAR; INTRAVENOUS at 10:14

## 2020-01-21 RX ADMIN — SODIUM CHLORIDE, POTASSIUM CHLORIDE, SODIUM LACTATE AND CALCIUM CHLORIDE: 600; 310; 30; 20 INJECTION, SOLUTION INTRAVENOUS at 08:48

## 2020-01-21 RX ADMIN — SODIUM CHLORIDE, POTASSIUM CHLORIDE, SODIUM LACTATE AND CALCIUM CHLORIDE: 600; 310; 30; 20 INJECTION, SOLUTION INTRAVENOUS at 07:00

## 2020-01-21 RX ADMIN — ACETAMINOPHEN 1000 MG: 500 TABLET, FILM COATED ORAL at 23:32

## 2020-01-21 RX ADMIN — ROCURONIUM BROMIDE 20 MG: 10 INJECTION, SOLUTION INTRAVENOUS at 08:46

## 2020-01-21 RX ADMIN — MORPHINE SULFATE 4 MG: 4 INJECTION INTRAVENOUS at 17:46

## 2020-01-21 RX ADMIN — LIDOCAINE HYDROCHLORIDE 40 MG: 20 INJECTION, SOLUTION EPIDURAL; INFILTRATION; INTRACAUDAL at 07:38

## 2020-01-21 RX ADMIN — HYDROMORPHONE HYDROCHLORIDE 0.4 MG: 1 INJECTION, SOLUTION INTRAMUSCULAR; INTRAVENOUS; SUBCUTANEOUS at 10:31

## 2020-01-21 RX ADMIN — FENTANYL CITRATE 50 MCG: 50 INJECTION, SOLUTION INTRAMUSCULAR; INTRAVENOUS at 09:31

## 2020-01-21 RX ADMIN — OXYCODONE HYDROCHLORIDE 10 MG: 5 SOLUTION ORAL at 10:15

## 2020-01-21 RX ADMIN — INDOCYANINE GREEN AND WATER 7.5 MG: KIT at 08:58

## 2020-01-21 RX ADMIN — ONDANSETRON 4 MG: 2 INJECTION INTRAMUSCULAR; INTRAVENOUS at 09:31

## 2020-01-21 RX ADMIN — ACETAMINOPHEN 1000 MG: 500 TABLET, FILM COATED ORAL at 13:46

## 2020-01-21 RX ADMIN — PHENYLEPHRINE HYDROCHLORIDE 200 MCG: 10 INJECTION INTRAVENOUS at 08:06

## 2020-01-21 RX ADMIN — CEFAZOLIN 2 G: 330 INJECTION, POWDER, FOR SOLUTION INTRAMUSCULAR; INTRAVENOUS at 07:39

## 2020-01-21 RX ADMIN — HYDROMORPHONE HYDROCHLORIDE 0.4 MG: 1 INJECTION, SOLUTION INTRAMUSCULAR; INTRAVENOUS; SUBCUTANEOUS at 10:26

## 2020-01-21 RX ADMIN — ACETAMINOPHEN 1000 MG: 500 TABLET, FILM COATED ORAL at 17:45

## 2020-01-21 RX ADMIN — SODIUM CHLORIDE, POTASSIUM CHLORIDE, SODIUM LACTATE AND CALCIUM CHLORIDE: 600; 310; 30; 20 INJECTION, SOLUTION INTRAVENOUS at 13:45

## 2020-01-21 RX ADMIN — PHENYLEPHRINE HYDROCHLORIDE 300 MCG: 10 INJECTION INTRAVENOUS at 08:00

## 2020-01-21 RX ADMIN — PHENYLEPHRINE HYDROCHLORIDE 200 MCG: 10 INJECTION INTRAVENOUS at 08:02

## 2020-01-21 RX ADMIN — PHENYLEPHRINE HYDROCHLORIDE 200 MCG: 10 INJECTION INTRAVENOUS at 08:09

## 2020-01-21 RX ADMIN — DEXAMETHASONE SODIUM PHOSPHATE 8 MG: 4 INJECTION, SOLUTION INTRA-ARTICULAR; INTRALESIONAL; INTRAMUSCULAR; INTRAVENOUS; SOFT TISSUE at 07:56

## 2020-01-21 ASSESSMENT — PATIENT HEALTH QUESTIONNAIRE - PHQ9
SUM OF ALL RESPONSES TO PHQ9 QUESTIONS 1 AND 2: 0
1. LITTLE INTEREST OR PLEASURE IN DOING THINGS: NOT AT ALL
2. FEELING DOWN, DEPRESSED, IRRITABLE, OR HOPELESS: NOT AT ALL

## 2020-01-21 ASSESSMENT — COGNITIVE AND FUNCTIONAL STATUS - GENERAL
TOILETING: A LITTLE
HELP NEEDED FOR BATHING: A LITTLE
DRESSING REGULAR LOWER BODY CLOTHING: A LITTLE
SUGGESTED CMS G CODE MODIFIER DAILY ACTIVITY: CJ
SUGGESTED CMS G CODE MODIFIER MOBILITY: CK
MOVING TO AND FROM BED TO CHAIR: A LITTLE
TURNING FROM BACK TO SIDE WHILE IN FLAT BAD: A LITTLE
DAILY ACTIVITIY SCORE: 21
MOBILITY SCORE: 18
STANDING UP FROM CHAIR USING ARMS: A LITTLE
MOVING FROM LYING ON BACK TO SITTING ON SIDE OF FLAT BED: A LITTLE
CLIMB 3 TO 5 STEPS WITH RAILING: A LITTLE
WALKING IN HOSPITAL ROOM: A LITTLE

## 2020-01-21 ASSESSMENT — LIFESTYLE VARIABLES
TOTAL SCORE: 0
TOTAL SCORE: 0
EVER HAD A DRINK FIRST THING IN THE MORNING TO STEADY YOUR NERVES TO GET RID OF A HANGOVER: NO
HOW MANY TIMES IN THE PAST YEAR HAVE YOU HAD 5 OR MORE DRINKS IN A DAY: 0
AVERAGE NUMBER OF DAYS PER WEEK YOU HAVE A DRINK CONTAINING ALCOHOL: 0
EVER FELT BAD OR GUILTY ABOUT YOUR DRINKING: NO
ALCOHOL_USE: NO
ON A TYPICAL DAY WHEN YOU DRINK ALCOHOL HOW MANY DRINKS DO YOU HAVE: 0
HAVE YOU EVER FELT YOU SHOULD CUT DOWN ON YOUR DRINKING: NO
HAVE PEOPLE ANNOYED YOU BY CRITICIZING YOUR DRINKING: NO
DOES PATIENT WANT TO STOP DRINKING: NO
CONSUMPTION TOTAL: NEGATIVE
EVER_SMOKED: NEVER
TOTAL SCORE: 0

## 2020-01-21 ASSESSMENT — PAIN SCALES - WONG BAKER
WONGBAKER_NUMERICALRESPONSE: HURTS A LITTLE MORE
WONGBAKER_NUMERICALRESPONSE: HURTS A LITTLE MORE

## 2020-01-21 ASSESSMENT — PAIN SCALES - GENERAL: PAIN_LEVEL: 6

## 2020-01-21 NOTE — ANESTHESIA TIME REPORT
Anesthesia Start and Stop Event Times     Date Time Event    1/21/2020 0733 Anesthesia Start     0947 Anesthesia Stop        Responsible Staff  01/21/20    Name Role Begin End    Dawn Hays M.D. Anesth 0733 0972        Preop Diagnosis (Free Text):  Pre-op Diagnosis     Rectal prolapse        Preop Diagnosis (Codes):    Post op Diagnosis  Rectal prolapse      Premium Reason  Non-Premium    Comments:

## 2020-01-21 NOTE — OP REPORT
DATE OF SERVICE:  01/21/2020    PREOPERATIVE DIAGNOSIS:  Rectal prolapse.    POSTOPERATIVE DIAGNOSIS:  Rectal prolapse.    PROCEDURES:  1.  Robotic low anterior resection.  2.  Robotic rectopexy.    SURGEON:  Daniel Palacio MD    ASSISTANT:  AUGIE Ponce    ANESTHESIA:  General endotracheal anesthesia.    ESTIMATED BLOOD LOSS:  25 mL    SPECIMENS:  Sigmoid colon and proximal rectum.    COMPLICATIONS:  None.    CONDITION:  Stable.    INDICATIONS FOR PROCEDURE:  This is a 64-year-old female who presents with a   full-thickness rectal prolapse.  The risks, benefits, and alternatives of   various repairs were explained to her.  She has opted for a LAR with pexy, as   she has a history of constipation and has a relatively young age for this   condition.  She completed a bowel prep and now presents for elective surgery.    OPERATIVE FINDINGS:  Redundant sigmoid colon removed, rectum mobilized to the   pelvic floor, colorectal anastomosis completed with hemostasis, rectopexy to   the sacral promontory.    OPERATIVE TECHNIQUE:  After informed consent was obtained, the patient was   taken to the operating room, placed in supine position.  After adequate   endotracheal anesthesia was achieved, the patient was switched to lithotomy   position and the rectum was washed out with Betadine, and the abdomen and   perineum were prepped and draped in a sterile fashion.  Operation was begun by   placing an 8 mm periumbilical incision, through which an 8 mm trocar was   introduced into the abdomen using Optiview technique.  After pneumoperitoneum   was achieved, additional trocars were placed, 12 mm robotic port in the right   lower quadrant and two 8 mm trocars in the left upper quadrant.  Finally, a 5   mm assistant port was placed in the right upper quadrant.  All trocars were   placed with 0.5% Marcaine with epinephrine for local anesthesia.    The patient was positioned and the da Karri Xi robotic system was docked.   We   began by taking down the left lateral attachments of the sigmoid colon.    Dissecting into the retroperitoneum, we were able to identify the ureter,   gonadal vessels and great vessels, which were preserved.  We opened the   retroperitoneum on the right side, preserving the right-sided structures as   well.  We dissected into the presacral plane, reflecting the pelvic nerves and   isolating the ALEX pedicle, which was ligated with a vessel sealer device.  We   then mobilized the descending colon until there was enough length to reach   the pelvis without undue tension.    Next, we turned our dissection toward the pelvis following the presacral plane   posteriorly.  We dissected all the way down to the pelvic floor.  We   dissected in the right and left lateral attachments and opened the cul-de-sac   anteriorly completing mobility of the rectum.  This was then retracted up out   of the pelvis and the mesentery divided with the vessel sealer device.  We   then performed a rectopexy to the remaining mesentery using interrupted 0 silk   sutures through the perirectal mesentery to the presacral promontory.  With   these in place,  We then divided the rectal wall with a green load robotic   staple fire and gave indocyanine green dye noting good blood flow to the   rectal stump as well as the planned anastomotic site in the descending colon.    Next, we made an extraction incision out of one of the port sites and   dissected into the subcutaneous plane.  We incised the muscles and retracted   the rectus muscles medially.  We opened the posterior sheath and secured a   wound retractor into position.  This was then extracted and we divided the   remaining mesentery with clamps and 0 Vicryl ties.  We then divided the bowel   wall with a Furness clamp and loaded a 29 EEA staple anvil into the end of the   colon.  This was washed with Betadine and reduced back into the abdominal   cavity.  The wound fascia was closed in  layers with running 0 PDS sutures and   local anesthetic block was given at the level of the fascia.    With pneumoperitoneum re-achieved, we inserted a 29 EEA stapler into the   patient's rectum, spike was deployed, anvil attached, and end-to-end   anastomosis carried out with hemostasis.  Leak test was performed and no   evidence of anastomotic leak was seen.  We were able to irrigate the pelvis   with a liter of warm saline and place a 19-Albanian round drain through one of   the port sites.  We then closed the 12 mm trocar site with an 0 PDS using an   EndoClose device.  Pneumoperitoneum was reduced and all trocars were removed.    The drain was sewn to the skin with a 2-0 nylon and hooked to bulb suction.    We then closed the remaining incisions with 3-0 Vicryl and 4-0 Monocryls.    Dermabond was placed and the patient returned to the PACU in stable condition.    All instruments counts were correct at the end of the procedure.       ____________________________________     MD DIXON Mckeon / PARKER    DD:  01/21/2020 12:01:31  DT:  01/21/2020 13:22:46    D#:  [id number]  Job#:  [id number]

## 2020-01-21 NOTE — ANESTHESIA PREPROCEDURE EVALUATION
Relevant Problems   CARDIAC   (+) Migraine without aura and without status migrainosus, not intractable      Other   (+) Chronic back pain   (+) Osteoporosis       Physical Exam    Airway   Mallampati: II  TM distance: >3 FB  Neck ROM: full       Cardiovascular - normal exam  Rhythm: regular  Rate: normal  (-) murmur     Dental - normal exam         Pulmonary - normal exam  Breath sounds clear to auscultation     Abdominal    Neurological - normal exam                 Anesthesia Plan    ASA 2       Plan - general and peripheral nerve block     Peripheral nerve block will be post-op pain control  Airway plan will be ETT        Induction: intravenous    Postoperative Plan: Postoperative administration of opioids is intended.    Pertinent diagnostic labs and testing reviewed    Informed Consent:    Anesthetic plan and risks discussed with patient.    Use of blood products discussed with: patient whom consented to blood products.

## 2020-01-21 NOTE — ANESTHESIA POSTPROCEDURE EVALUATION
Patient: Carlee Zavala    Procedure Summary     Date:  01/21/20 Room / Location:  [facility] 11 / SURGERY [facility]    Anesthesia Start:  0733 Anesthesia Stop:  0947    Procedures:       RESECTION, LOW ANTERIOR, ROBOT-ASSISTED, LAPAROSCOPIC, USING DA VIRGINIE XI      RECTOPEXY, ROBOT-ASSISTED, LAPAROSCOPIC, USING DA VIRGINIE XI Diagnosis:  (Rectal prolapse)    Surgeon:  Daniel Palacio M.D. Responsible Provider:  Dawn Hays M.D.    Anesthesia Type:  general, peripheral nerve block ASA Status:  2          Final Anesthesia Type: general, peripheral nerve block  Last vitals  BP   Blood Pressure: 125/61    Temp   36.2 °C (97.2 °F)    Pulse   Pulse: (!) 104   Resp   18    SpO2   100 %      Anesthesia Post Evaluation    Patient location during evaluation: PACU  Patient participation: complete - patient participated  Level of consciousness: awake and alert  Pain score: 6    Airway patency: patent  Anesthetic complications: no  Cardiovascular status: hemodynamically stable  Respiratory status: acceptable  Hydration status: euvolemic    PONV: none           Nurse Pain Score: 7 (NPRS)

## 2020-01-21 NOTE — ANESTHESIA PROCEDURE NOTES
Airway  Date/Time: 1/21/2020 7:41 AM  Performed by: Dawn Hays M.D.  Authorized by: Dawn Hays M.D.     Location:  OR  Urgency:  Elective  Difficult Airway: No    Indications for Airway Management:  Anesthesia  Spontaneous Ventilation: absent    Sedation Level:  Deep  Preoxygenated: Yes    Patient Position:  Sniffing  Mask Difficulty Assessment:  1 - vent by mask  Final Airway Type:  Endotracheal airway  Final Endotracheal Airway:  ETT  Cuffed: Yes    Technique Used for Successful ETT Placement:  Direct laryngoscopy  Insertion Site:  Oral  Blade Type:  Jaimee  Laryngoscope Blade/Videolaryngoscope Blade Size:  3  ETT Size (mm):  6.5  Measured from:  Teeth  ETT to Teeth (cm):  20  Placement Verified by: auscultation and capnometry    Cormack-Lehane Classification:  Grade I - full view of glottis  Number of Attempts at Approach:  1

## 2020-01-21 NOTE — CARE PLAN
Problem: Venous Thromboembolism (VTW)/Deep Vein Thrombosis (DVT) Prevention:  Goal: Patient will participate in Venous Thrombosis (VTE)/Deep Vein Thrombosis (DVT)Prevention Measures  Outcome: PROGRESSING AS EXPECTED  Patient ambulated from gurney to bed. SCD's on.   Problem: Knowledge Deficit  Goal: Knowledge of disease process/condition, treatment plan, diagnostic tests, and medications will improve  Outcome: PROGRESSING AS EXPECTED  Patient oriented to room, unit routine, call light, medications and plan of care.

## 2020-01-21 NOTE — PROGRESS NOTES
Patient arrived to floor via gurney from [facility]. Patient resistant to ambulate to bed, but did. Patient was crouched over and would not stand up straight. Patient shaking due to pain, but shaking stopped once she settled in bed. Lap sites x4 are approximated with dermabond and open to air. All other skin intact. RAY drain to RLQ is self compressed with sang drainage noted. Patient given water. Admit profile completed.  at bedside. No other needs at this time.

## 2020-01-21 NOTE — OR SURGEON
Immediate Post OP Note    PreOp Diagnosis: Rectal Prolapse    PostOp Diagnosis: same    Procedure(s):  RESECTION, LOW ANTERIOR, ROBOT-ASSISTED, LAPAROSCOPIC, USING DA VIRGINIE XI - Wound Class: Clean Contaminated  RECTOPEXY, ROBOT-ASSISTED, LAPAROSCOPIC, USING DA VIRGINIE XI - Wound Class: Clean Contaminated    Surgeon(s):  Daniel Palacio M.D.    Anesthesiologist/Type of Anesthesia:  Anesthesiologist: Dawn Hays M.D./General    Surgical Staff:  Assistant: CLARENCE Singh  Circulator: Kaelyn Ward R.N.  Scrub Person: Arabella Dunlap    Specimens removed if any:  ID Type Source Tests Collected by Time Destination   A : Sigmoid colon and proximal rectum Other Other PATHOLOGY SPECIMEN Daniel Palacio M.D. 1/21/2020  9:10 AM        Estimated Blood Loss: 25cc    Findings: Redundant sigmoid colon removed, colorectal anastomosis placed with hemostasis.  Rectopexy placed to the sacral promentory    Complications: none        1/21/2020 9:59 AM Daniel Palacio M.D.

## 2020-01-21 NOTE — ANESTHESIA PROCEDURE NOTES
Peripheral Block  Date/Time: 1/21/2020 7:49 AM  Performed by: Dawn Hays M.D.  Authorized by: Dawn Hays M.D.     Patient Location:  OR  Start Time:  1/21/2020 7:49 AM  End Time:  1/21/2020 7:53 AM  Reason for Block: at surgeon's request and post-op pain management    patient identified, IV checked, site marked, risks and benefits discussed, surgical consent, monitors and equipment checked, pre-op evaluation and timeout performed    Patient Position:  Supine  Prep: ChloraPrep    Monitoring:  Heart rate, continuous pulse ox and cardiac monitor  Block Region:  Trunk  Trunk - Block Type:  Abdominal plane block - TAP block    Laterality:  Bilateral  Procedures: ultrasound guided  Image captured, interpreted and electronically stored.  Local Infiltration:  Lidocaine  Strength:  1 %  Dose:  3 ml  Block Type:  Single-shot  Needle Length:  100mm  Needle Gauge:  21 G  Needle Localization:  Ultrasound guidance  Injection Assessment:  Negative aspiration for heme, no paresthesia on injection, incremental injection and local visualized surrounding nerve on ultrasound  Evidence of intravascular injection: No     15 mL local injected on each side

## 2020-01-22 LAB
ANION GAP SERPL CALC-SCNC: 10 MMOL/L (ref 0–11.9)
BUN SERPL-MCNC: 16 MG/DL (ref 8–22)
CALCIUM SERPL-MCNC: 9.2 MG/DL (ref 8.5–10.5)
CHLORIDE SERPL-SCNC: 100 MMOL/L (ref 96–112)
CO2 SERPL-SCNC: 26 MMOL/L (ref 20–33)
CREAT SERPL-MCNC: 1.03 MG/DL (ref 0.5–1.4)
ERYTHROCYTE [DISTWIDTH] IN BLOOD BY AUTOMATED COUNT: 43.8 FL (ref 35.9–50)
GLUCOSE SERPL-MCNC: 119 MG/DL (ref 65–99)
HCT VFR BLD AUTO: 39.8 % (ref 37–47)
HGB BLD-MCNC: 13.7 G/DL (ref 12–16)
MAGNESIUM SERPL-MCNC: 1.8 MG/DL (ref 1.5–2.5)
MCH RBC QN AUTO: 34.3 PG (ref 27–33)
MCHC RBC AUTO-ENTMCNC: 34.4 G/DL (ref 33.6–35)
MCV RBC AUTO: 99.5 FL (ref 81.4–97.8)
PLATELET # BLD AUTO: 163 K/UL (ref 164–446)
PMV BLD AUTO: 11.3 FL (ref 9–12.9)
POTASSIUM SERPL-SCNC: 4.2 MMOL/L (ref 3.6–5.5)
RBC # BLD AUTO: 4 M/UL (ref 4.2–5.4)
SODIUM SERPL-SCNC: 136 MMOL/L (ref 135–145)
WBC # BLD AUTO: 11.8 K/UL (ref 4.8–10.8)

## 2020-01-22 PROCEDURE — 85027 COMPLETE CBC AUTOMATED: CPT

## 2020-01-22 PROCEDURE — A9270 NON-COVERED ITEM OR SERVICE: HCPCS | Performed by: SURGERY

## 2020-01-22 PROCEDURE — 700102 HCHG RX REV CODE 250 W/ 637 OVERRIDE(OP): Performed by: SURGERY

## 2020-01-22 PROCEDURE — 36415 COLL VENOUS BLD VENIPUNCTURE: CPT

## 2020-01-22 PROCEDURE — 700111 HCHG RX REV CODE 636 W/ 250 OVERRIDE (IP): Performed by: SURGERY

## 2020-01-22 PROCEDURE — 83735 ASSAY OF MAGNESIUM: CPT

## 2020-01-22 PROCEDURE — 770006 HCHG ROOM/CARE - MED/SURG/GYN SEMI*

## 2020-01-22 PROCEDURE — 80048 BASIC METABOLIC PNL TOTAL CA: CPT

## 2020-01-22 RX ORDER — PROMETHAZINE HYDROCHLORIDE 25 MG/1
25 TABLET ORAL EVERY 6 HOURS PRN
Status: DISCONTINUED | OUTPATIENT
Start: 2020-01-22 | End: 2020-01-23 | Stop reason: HOSPADM

## 2020-01-22 RX ADMIN — KETOROLAC TROMETHAMINE 15 MG: 30 INJECTION, SOLUTION INTRAMUSCULAR at 05:35

## 2020-01-22 RX ADMIN — MORPHINE SULFATE 4 MG: 4 INJECTION INTRAVENOUS at 07:18

## 2020-01-22 RX ADMIN — ACETAMINOPHEN 1000 MG: 500 TABLET, FILM COATED ORAL at 17:48

## 2020-01-22 RX ADMIN — KETOROLAC TROMETHAMINE 15 MG: 30 INJECTION, SOLUTION INTRAMUSCULAR at 11:33

## 2020-01-22 RX ADMIN — ACETAMINOPHEN 1000 MG: 500 TABLET, FILM COATED ORAL at 11:28

## 2020-01-22 RX ADMIN — ACETAMINOPHEN 1000 MG: 500 TABLET, FILM COATED ORAL at 05:35

## 2020-01-22 RX ADMIN — OXYCODONE HYDROCHLORIDE 10 MG: 10 TABLET ORAL at 02:33

## 2020-01-22 RX ADMIN — ENOXAPARIN SODIUM 40 MG: 100 INJECTION SUBCUTANEOUS at 07:18

## 2020-01-22 RX ADMIN — PROMETHAZINE HYDROCHLORIDE 25 MG: 25 TABLET ORAL at 11:27

## 2020-01-22 RX ADMIN — OXYCODONE HYDROCHLORIDE 10 MG: 10 TABLET ORAL at 19:23

## 2020-01-22 RX ADMIN — OXYCODONE HYDROCHLORIDE 10 MG: 10 TABLET ORAL at 16:31

## 2020-01-22 RX ADMIN — KETOROLAC TROMETHAMINE 15 MG: 30 INJECTION, SOLUTION INTRAMUSCULAR at 16:31

## 2020-01-22 NOTE — CARE PLAN
Problem: Bowel/Gastric:  Goal: Normal bowel function is maintained or improved  Outcome: PROGRESSING AS EXPECTED  Patient did have an incontinent bowel movement last night.    Problem: Pain Management  Goal: Pain level will decrease to patient's comfort goal  Outcome: PROGRESSING SLOWER THAN EXPECTED  Patient states pain is tolerable, but then has episodes of shaking with movement and refusals of movement.

## 2020-01-22 NOTE — CARE PLAN
Problem: Safety  Goal: Will remain free from falls  Outcome: PROGRESSING AS EXPECTED   Educate patient on level of fall risk and provide frequent toileting. Round frequently.    Problem: Pain Management  Goal: Pain level will decrease to patient's comfort goal  Outcome: PROGRESSING AS EXPECTED   Perform frequent pain assessment and medicate according to MAR.

## 2020-01-22 NOTE — PROGRESS NOTES
Report received from day shift RN, assumed Care.   Patient is AOx4, responds appropriately.      Pain controlled at this time.  Patient is tolerating regular diet, denies nausea/vomiting. + flatus. x4 lap sites to abdomen well approximated with dermabond and open to air. Pt saturating at 94% on 1L O2 via nasal cannula.  Up x1 assist with steady gait.     Plan of care discussed, all questions answered.    Educated on use of call light and importance of calling before getting out of bed. Pt verbalizes understanding.    Call light and belongings within reach, treaded slipper socks on, SCDs in use, bed in lowest locked position.  All needs met at this time.

## 2020-01-22 NOTE — PROGRESS NOTES
"S:  64 y.o.female s/p Robotic LAR, Pexy  POD# 1.  Patient doing well this morning, not ambulating but sexton has been removed, she is tolerating PO and starting to pass some flatus/stool.  She denies any nausea or emesis at this time.      O:  /68   Pulse 90   Temp 36.9 °C (98.4 °F) (Temporal)   Resp 17   Ht 1.651 m (5' 5\")   Wt 49.9 kg (110 lb 0.2 oz)   SpO2 96%   I/O last 3 completed shifts:  In: 2035 [P.O.:450; I.V.:1575; Other:10]  Out: 1395 [Urine:810; Drains:585]  Recent Labs     01/22/20  0347   SODIUM 136   POTASSIUM 4.2   CHLORIDE 100   CO2 26   GLUCOSE 119*   BUN 16   CREATININE 1.03   CALCIUM 9.2     Recent Labs     01/22/20  0347   WBC 11.8*   RBC 4.00*   HEMOGLOBIN 13.7   HEMATOCRIT 39.8   MCV 99.5*   MCH 34.3*   MCHC 34.4   RDW 43.8   PLATELETCT 163*   MPV 11.3       Alert and Oriented x3, No Acute Distress  Normal Respiratory Effort  Abdomen soft, appropriately tender  Incisions/Bandages clean/dry/intact  Extremities warm and well perfused  RAY Output Serosanguinous- 585cc     A/P:  Pain control with PO meds  Diet as tolerated  Fluids SLIV  Ambulate tid and ad ezio  Once activity increased, likely home tomorrow.      "

## 2020-01-22 NOTE — CARE PLAN
Problem: Nutritional:  Goal: Achieve adequate nutritional intake  Description  Patient will consume 50-75% of meals/milkshakes.    Outcome: NOT MET

## 2020-01-22 NOTE — PROGRESS NOTES
Assumed care of patient. Patient awake and pleasant, but stating pain is an 8/10. Medicated per MAR. Lap sites x4 are approximated with dermabond and open to air. RAY drain to RLQ is self compressed with large amounts of sero sang drainage. Patient refusing movement or ambulation due to pain. Patient highly educated on ambulation. Julio was removed at 0600 and patient verbalized need to void by 1200. No other needs at this time. Call light within reach.

## 2020-01-22 NOTE — DIETARY
Nutrition Services: Day 1 of admit. Carlee Zavala is a 64 y.o. female with admitting DX of Rectal prolapse.  Consult received for BMI <19.     UBW is 47.7-50 kg (105-110 lb) per patient verbalization and she recalls weighing this for the last 4 years. Pt denies weight loss. PTA pt stated PO intake was consistent with baseline. Pt denies changes in physical appearance and appears adequately nourished. Pt was offered milkshakes and was agreeable.     Assessment:  Ht: 165.1 cm, Wt 1/21: 49.9 kg (110 lb 0.2 oz) via stand up scale, BMI: 18.31 - underweight  Diet/Intake: regular - Per chart pt PO % x 2 meals    Evaluation:   1. Labs: glucose 119  2. Meds: decadron PRN, oxycodone, phenergan  3. Abdominal incision noted per flow sheet. No wound team consult pending.   4. Last BM: 1/21    Malnutrition Risk: No criteria met at this time.     Recommendations/Plan:  1. Will order high-protein milkshakes BID.    2. Encourage intake of meals/high-protein milkshakes.  3. Document intake of all meals/high-protein milkshakes as % taken in ADL's to provide interdisciplinary communication across all shifts.   4. Monitor weight.  5. Nutrition rep will continue to see patient for ongoing meal and snack preferences.  6. Obtain supplement order per RD as needed.    RD following.

## 2020-01-23 VITALS
SYSTOLIC BLOOD PRESSURE: 144 MMHG | RESPIRATION RATE: 16 BRPM | DIASTOLIC BLOOD PRESSURE: 84 MMHG | HEIGHT: 65 IN | BODY MASS INDEX: 18.33 KG/M2 | WEIGHT: 110.01 LBS | HEART RATE: 100 BPM | OXYGEN SATURATION: 95 % | TEMPERATURE: 98.5 F

## 2020-01-23 LAB
ANION GAP SERPL CALC-SCNC: 6 MMOL/L (ref 0–11.9)
BUN SERPL-MCNC: 13 MG/DL (ref 8–22)
CALCIUM SERPL-MCNC: 9.5 MG/DL (ref 8.5–10.5)
CHLORIDE SERPL-SCNC: 102 MMOL/L (ref 96–112)
CO2 SERPL-SCNC: 28 MMOL/L (ref 20–33)
CREAT SERPL-MCNC: 0.96 MG/DL (ref 0.5–1.4)
ERYTHROCYTE [DISTWIDTH] IN BLOOD BY AUTOMATED COUNT: 43.8 FL (ref 35.9–50)
GLUCOSE SERPL-MCNC: 116 MG/DL (ref 65–99)
HCT VFR BLD AUTO: 39.3 % (ref 37–47)
HGB BLD-MCNC: 13.4 G/DL (ref 12–16)
MCH RBC QN AUTO: 34.2 PG (ref 27–33)
MCHC RBC AUTO-ENTMCNC: 34.1 G/DL (ref 33.6–35)
MCV RBC AUTO: 100.3 FL (ref 81.4–97.8)
PLATELET # BLD AUTO: 141 K/UL (ref 164–446)
PMV BLD AUTO: 11.1 FL (ref 9–12.9)
POTASSIUM SERPL-SCNC: 4.4 MMOL/L (ref 3.6–5.5)
RBC # BLD AUTO: 3.92 M/UL (ref 4.2–5.4)
SODIUM SERPL-SCNC: 136 MMOL/L (ref 135–145)
WBC # BLD AUTO: 11.7 K/UL (ref 4.8–10.8)

## 2020-01-23 PROCEDURE — 700111 HCHG RX REV CODE 636 W/ 250 OVERRIDE (IP): Performed by: SURGERY

## 2020-01-23 PROCEDURE — 700102 HCHG RX REV CODE 250 W/ 637 OVERRIDE(OP): Performed by: SURGERY

## 2020-01-23 PROCEDURE — A9270 NON-COVERED ITEM OR SERVICE: HCPCS | Performed by: SURGERY

## 2020-01-23 PROCEDURE — 85027 COMPLETE CBC AUTOMATED: CPT

## 2020-01-23 PROCEDURE — 80048 BASIC METABOLIC PNL TOTAL CA: CPT

## 2020-01-23 PROCEDURE — 36415 COLL VENOUS BLD VENIPUNCTURE: CPT

## 2020-01-23 RX ORDER — OXYCODONE HYDROCHLORIDE AND ACETAMINOPHEN 5; 325 MG/1; MG/1
1-2 TABLET ORAL EVERY 4 HOURS PRN
Qty: 30 TAB | Refills: 0 | Status: SHIPPED | OUTPATIENT
Start: 2020-01-23 | End: 2020-01-30

## 2020-01-23 RX ADMIN — OXYCODONE HYDROCHLORIDE 10 MG: 10 TABLET ORAL at 06:14

## 2020-01-23 RX ADMIN — ACETAMINOPHEN 1000 MG: 500 TABLET, FILM COATED ORAL at 00:00

## 2020-01-23 RX ADMIN — KETOROLAC TROMETHAMINE 15 MG: 30 INJECTION, SOLUTION INTRAMUSCULAR at 06:14

## 2020-01-23 RX ADMIN — KETOROLAC TROMETHAMINE 15 MG: 30 INJECTION, SOLUTION INTRAMUSCULAR at 00:00

## 2020-01-23 RX ADMIN — ACETAMINOPHEN 1000 MG: 500 TABLET, FILM COATED ORAL at 06:14

## 2020-01-23 RX ADMIN — OXYCODONE HYDROCHLORIDE 10 MG: 10 TABLET ORAL at 00:02

## 2020-01-23 NOTE — DISCHARGE INSTRUCTIONS
Discharge Instructions    Discharged to home by car with relative. Discharged via wheelchair, hospital escort: Refused.  Special equipment needed: Not Applicable    Be sure to schedule a follow-up appointment with your primary care doctor or any specialists as instructed.     Discharge Plan:   Diet Plan: Discussed  Activity Level: Discussed  Confirmed Follow up Appointment: Patient to Call and Schedule Appointment  Confirmed Symptoms Management: Discussed  Medication Reconciliation Updated: Yes  Influenza Vaccine Indication: Not indicated: Previously immunized this influenza season and > 8 years of age    I understand that a diet low in cholesterol, fat, and sodium is recommended for good health. Unless I have been given specific instructions below for another diet, I accept this instruction as my diet prescription.   Other diet: Regular    Special Instructions: None    · Is patient discharged on Warfarin / Coumadin?   No     Rectal Prolapse, Adult  Introduction  Rectal prolapse happens when the inside of the final section of the large intestine (rectum) pushes out through the anal opening. With this condition, the lower part of the rectum turns inside out.  At first, rectal prolapse may be temporary. It may happen only when you are having a bowel movement. Over time, the prolapse will likely get worse. It may start to happen more often and cause uncomfortable symptoms. Eventually, the prolapse may happen when you are walking or simply standing. Surgery is often needed for this condition.  What are the causes?  This condition may result from weakness of the muscles that attach the rectum to the inside of the lower abdomen. The exact cause of this muscle weakness is not known.  What increases the risk?  This condition is more likely to develop in:  · Women who are 50 years of age or older.  · People with a history of constipation.  · People with a history of hemorrhoids.  · People who have a lower spinal cord  injury.  · Women who have been pregnant many times.  · People who have had rectal surgery.  · Men who have an enlarged prostate gland.  · People who have chronic obstructive pulmonary disease (COPD).  · People who have cystic fibrosis.  What are the signs or symptoms?  The main symptom of this condition is a red bump of tissue sticking out from your anus. At first, the bump may only appear after a bowel movement. It may then start to appear more often. Other symptoms may include:  · Discomfort in the anus and rectum.  · Constipation.  · Diarrhea.  · Inability to control bowel movements (incontinence).  · Rectal bleeding.  How is this diagnosed?  This condition may be diagnosed based on your symptoms and a physical exam. During the exam, you may be asked to squat and strain as though you are having a bowel movement. You may also have tests, such as:  · A rectal exam using a flexible scope (sigmoidoscopy or colonoscopy).  · A procedure that involves taking X-rays of your rectum after a dye (contrast material) is injected into the rectum (defecogram).  How is this treated?  This condition is usually treated with surgery to repair the weakened muscles and to reconnect the rectum to attachments inside the lower abdomen. Other treatment options may include:  · Pushing the prolapsed area back into the rectum (reduction). Your health care provider may do this by gently pushing it back in using a moist cloth. The health care provider may also show you how to do this at home if the prolapse occurs again.  · Medicines to prevent constipation and straining. This may include laxatives or stool softeners.  Follow these instructions at home:  General instructions  · Take over-the-counter and prescription medicines only as told by your health care provider.  · Do not strain to have a bowel movement.  · Do not lift anything that is heavier than 10 lb (4.5 kg).  · Follow instructions from your health care provider about what to do if  the prolapse occurs again and does not go back in. This may involve lying on your side and using a moist cloth to gently press the lump into your rectum.  · Keep all follow-up visits as told by your health care provider. This is important.  Preventing Constipation  · Eat foods that have a lot of fiber, such as fruits, vegetables, whole grains, and beans.  · Limit foods high in fat and processed sugars, such as french fries, hamburgers, cookies, candies, and soda.  · Drink enough fluids to keep your urine clear or pale yellow.  Contact a health care provider if:  · You have a fever.  · Your prolapse cannot be reduced at home.  · You have constipation or diarrhea.  · You have mild rectal bleeding.  Get help right away if:  · You have very bad rectal pain.  · You bleed heavily from your rectum.  This information is not intended to replace advice given to you by your health care provider. Make sure you discuss any questions you have with your health care provider.  Document Released: 09/07/2016 Document Revised: 05/25/2017 Document Reviewed: 01/06/2016  © 2017 [employer]              Depression / Suicide Risk    As you are discharged from this [facility] facility, it is important to learn how to keep safe from harming yourself.    Recognize the warning signs:  · Abrupt changes in personality, positive or negative- including increase in energy   · Giving away possessions  · Change in eating patterns- significant weight changes-  positive or negative  · Change in sleeping patterns- unable to sleep or sleeping all the time   · Unwillingness or inability to communicate  · Depression  · Unusual sadness, discouragement and loneliness  · Talk of wanting to die  · Neglect of personal appearance   · Rebelliousness- reckless behavior  · Withdrawal from people/activities they love  · Confusion- inability to concentrate     If you or a loved one observes any of these behaviors or has concerns about self-harm, here's what you can  do:  · Talk about it- your feelings and reasons for harming yourself  · Remove any means that you might use to hurt yourself (examples: pills, rope, extension cords, firearm)  · Get professional help from the community (Mental Health, Substance Abuse, psychological counseling)  · Do not be alone:Call your Safe Contact- someone whom you trust who will be there for you.  · Call your local CRISIS HOTLINE 036-5844 or 520-946-9549  · Call your local Children's Mobile Crisis Response Team [place] (377) 725-7170 or www.[employer]  · Call the toll free National Suicide Prevention Hotlines   · National Suicide Prevention Lifeline 266-359-XHSU (8294)  · National Hope Line Network 800-SUICIDE (433-5647)

## 2020-01-23 NOTE — PROGRESS NOTES
1/23  Pt seen and examined, tolerating PO, ambulating, pain controlled, wants to go home this morning.  Wounds healing well, drain serosanguinous only

## 2020-01-23 NOTE — DISCHARGE SUMMARY
Discharge Summary      DATE OF ADMISSION: 1/21/2020    DATE OF DISCHARGE: 1/23/2020    ADMISSION DIAGNOSIS (ES):  ? Rectal Prolapse    DISCHARGE DIAGNOSIS (ES):  ? same    DISCHARGE CONDITION:  ? stable    CONSULTATIONS:  ? none    PROCEDURES:  ? Robotic LAR with Pexy    BRIEF HPI:  ? 64y F here with rectal prolapse for an elective repair    HOSPITAL COURSE:  ? Underwent above surgery without complication, recovered post-op and by POD 2 she was able to ambulate, tolerate a diet, pain was controlled, and her incisions were healing well.      MEDS:   Current Outpatient Medications   Medication Sig Dispense Refill   • oxyCODONE-acetaminophen (PERCOCET) 5-325 MG Tab Take 1-2 Tabs by mouth every four hours as needed (pain) for up to 7 days. 30 Tab 0       FOLLOW-UP:  ? Please call my office at [phone] to make an appointment in 1 weeks    DISCHARGE INSTRUCTIONS:

## 2020-01-23 NOTE — PROGRESS NOTES
Patient discharged home via walking, with relative, via car, Hospital escort refused. IV removed. RAY drain removed. All discharge instructions gone over and all questions answered. Paper prescription given to patient. All safety measures observed. No other complaints or concerns at this time.

## 2020-01-23 NOTE — CARE PLAN
Problem: Safety  Goal: Will remain free from falls  Outcome: PROGRESSING AS EXPECTED  Intervention: Implement fall precautions  Flowsheets  Taken 1/23/2020 0158  Bed Alarm: Alarm Not On  Taken 1/22/2020 2020  Environmental Precautions: Treaded Slipper Socks on Patient;Personal Belongings, Wastebasket, Call Bell etc. in Easy Reach;Transferred to Stronger Side;Bed in Low Position  Note:   Patient is a low fall risk. Patient educated to call for assistance. Call light left within reach of patient.       Problem: Infection  Goal: Will remain free from infection  Outcome: PROGRESSING AS EXPECTED  Intervention: Implement standard precautions and perform hand washing before and after patient contact  Note:   Hand hygiene performed prior to and after entering pt room. Gloves worn during patient interactions.

## 2020-01-23 NOTE — PROGRESS NOTES
Bedside report completed.  A&O x4.  In no acute distress.   Patient is intermittently tachycardic. on RA.   Ambulating self  Tolerating regular diet, denies N/V.  Complaints of 7/10 abdominal pain, will medicate per MAR.  Skin: x4 lap sites  RAY drain to RLQ   Last BM 1/22/20  Up to bathroom to void.    Call light and personal belongings within reach.  SCDs in place. POC discussed and all questions answered. No additional needs at this time.

## 2020-01-23 NOTE — PROGRESS NOTES
Patient has not voided during shift. Bladder scan showed 175 ml. Patient encouraged to drink more fluids and ambulate.

## 2020-01-28 ENCOUNTER — TELEPHONE (OUTPATIENT)
Dept: MEDICAL GROUP | Facility: LAB | Age: 65
End: 2020-01-28

## 2020-01-28 DIAGNOSIS — Z01.419 ENCOUNTER FOR GYNECOLOGICAL EXAMINATION: ICD-10-CM

## 2020-02-04 ENCOUNTER — PATIENT MESSAGE (OUTPATIENT)
Dept: MEDICAL GROUP | Facility: LAB | Age: 65
End: 2020-02-04

## 2020-02-04 DIAGNOSIS — H53.8 BLURRED VISION: ICD-10-CM

## 2020-02-04 NOTE — TELEPHONE ENCOUNTER
----- Message from Carlee Zavala sent at 2/4/2020  8:01 AM PST -----  Regarding: Non-Urgent Medical Question  Contact: [phone]  Can you please give me a referral to a eye doctor..the olopotodine  Drops are not working..I have blurred  vision in the left eye.only

## 2020-02-07 ENCOUNTER — APPOINTMENT (OUTPATIENT)
Dept: RADIOLOGY | Facility: MEDICAL CENTER | Age: 65
DRG: 392 | End: 2020-02-07
Attending: SURGERY
Payer: COMMERCIAL

## 2020-02-07 ENCOUNTER — HOSPITAL ENCOUNTER (INPATIENT)
Facility: MEDICAL CENTER | Age: 65
LOS: 1 days | DRG: 392 | End: 2020-02-09
Attending: EMERGENCY MEDICINE | Admitting: SURGERY
Payer: COMMERCIAL

## 2020-02-07 ENCOUNTER — APPOINTMENT (OUTPATIENT)
Dept: RADIOLOGY | Facility: MEDICAL CENTER | Age: 65
DRG: 392 | End: 2020-02-07
Attending: EMERGENCY MEDICINE
Payer: COMMERCIAL

## 2020-02-07 DIAGNOSIS — R19.7 NAUSEA, VOMITING, AND DIARRHEA: ICD-10-CM

## 2020-02-07 DIAGNOSIS — K62.89 PROCTITIS: ICD-10-CM

## 2020-02-07 DIAGNOSIS — G89.18 POST-OP PAIN: ICD-10-CM

## 2020-02-07 DIAGNOSIS — K52.9 COLOPROCTITIS: ICD-10-CM

## 2020-02-07 DIAGNOSIS — R11.2 NAUSEA, VOMITING, AND DIARRHEA: ICD-10-CM

## 2020-02-07 LAB
ALBUMIN SERPL BCP-MCNC: 4.5 G/DL (ref 3.2–4.9)
ALBUMIN/GLOB SERPL: 1.6 G/DL
ALP SERPL-CCNC: 86 U/L (ref 30–99)
ALT SERPL-CCNC: 12 U/L (ref 2–50)
ANION GAP SERPL CALC-SCNC: 13 MMOL/L (ref 0–11.9)
AST SERPL-CCNC: 18 U/L (ref 12–45)
BASOPHILS # BLD AUTO: 0.5 % (ref 0–1.8)
BASOPHILS # BLD: 0.04 K/UL (ref 0–0.12)
BILIRUB SERPL-MCNC: 0.6 MG/DL (ref 0.1–1.5)
BUN SERPL-MCNC: 10 MG/DL (ref 8–22)
CALCIUM SERPL-MCNC: 9.5 MG/DL (ref 8.5–10.5)
CHLORIDE SERPL-SCNC: 106 MMOL/L (ref 96–112)
CO2 SERPL-SCNC: 24 MMOL/L (ref 20–33)
CREAT SERPL-MCNC: 0.92 MG/DL (ref 0.5–1.4)
EOSINOPHIL # BLD AUTO: 0 K/UL (ref 0–0.51)
EOSINOPHIL NFR BLD: 0 % (ref 0–6.9)
ERYTHROCYTE [DISTWIDTH] IN BLOOD BY AUTOMATED COUNT: 47.8 FL (ref 35.9–50)
GLOBULIN SER CALC-MCNC: 2.8 G/DL (ref 1.9–3.5)
GLUCOSE SERPL-MCNC: 137 MG/DL (ref 65–99)
HCT VFR BLD AUTO: 39.8 % (ref 37–47)
HGB BLD-MCNC: 13.4 G/DL (ref 12–16)
IMM GRANULOCYTES # BLD AUTO: 0.03 K/UL (ref 0–0.11)
IMM GRANULOCYTES NFR BLD AUTO: 0.3 % (ref 0–0.9)
LIPASE SERPL-CCNC: 21 U/L (ref 11–82)
LYMPHOCYTES # BLD AUTO: 0.9 K/UL (ref 1–4.8)
LYMPHOCYTES NFR BLD: 10.4 % (ref 22–41)
MCH RBC QN AUTO: 33.4 PG (ref 27–33)
MCHC RBC AUTO-ENTMCNC: 33.7 G/DL (ref 33.6–35)
MCV RBC AUTO: 99.3 FL (ref 81.4–97.8)
MONOCYTES # BLD AUTO: 0.17 K/UL (ref 0–0.85)
MONOCYTES NFR BLD AUTO: 2 % (ref 0–13.4)
NEUTROPHILS # BLD AUTO: 7.54 K/UL (ref 2–7.15)
NEUTROPHILS NFR BLD: 86.8 % (ref 44–72)
NRBC # BLD AUTO: 0 K/UL
NRBC BLD-RTO: 0 /100 WBC
PLATELET # BLD AUTO: 275 K/UL (ref 164–446)
PMV BLD AUTO: 10.9 FL (ref 9–12.9)
POTASSIUM SERPL-SCNC: 3.4 MMOL/L (ref 3.6–5.5)
PROT SERPL-MCNC: 7.3 G/DL (ref 6–8.2)
RBC # BLD AUTO: 4.01 M/UL (ref 4.2–5.4)
SODIUM SERPL-SCNC: 143 MMOL/L (ref 135–145)
WBC # BLD AUTO: 8.7 K/UL (ref 4.8–10.8)

## 2020-02-07 PROCEDURE — 83690 ASSAY OF LIPASE: CPT

## 2020-02-07 PROCEDURE — 36415 COLL VENOUS BLD VENIPUNCTURE: CPT

## 2020-02-07 PROCEDURE — 71045 X-RAY EXAM CHEST 1 VIEW: CPT

## 2020-02-07 PROCEDURE — 700111 HCHG RX REV CODE 636 W/ 250 OVERRIDE (IP): Performed by: EMERGENCY MEDICINE

## 2020-02-07 PROCEDURE — 700111 HCHG RX REV CODE 636 W/ 250 OVERRIDE (IP)

## 2020-02-07 PROCEDURE — 80053 COMPREHEN METABOLIC PANEL: CPT

## 2020-02-07 PROCEDURE — 99285 EMERGENCY DEPT VISIT HI MDM: CPT

## 2020-02-07 PROCEDURE — 96365 THER/PROPH/DIAG IV INF INIT: CPT

## 2020-02-07 PROCEDURE — 700105 HCHG RX REV CODE 258: Performed by: EMERGENCY MEDICINE

## 2020-02-07 PROCEDURE — 85025 COMPLETE CBC W/AUTO DIFF WBC: CPT

## 2020-02-07 PROCEDURE — 96375 TX/PRO/DX INJ NEW DRUG ADDON: CPT

## 2020-02-07 RX ORDER — ONDANSETRON 4 MG/1
4 TABLET, ORALLY DISINTEGRATING ORAL ONCE
Status: COMPLETED | OUTPATIENT
Start: 2020-02-07 | End: 2020-02-07

## 2020-02-07 RX ORDER — ONDANSETRON 2 MG/ML
INJECTION INTRAMUSCULAR; INTRAVENOUS
Status: COMPLETED
Start: 2020-02-07 | End: 2020-02-07

## 2020-02-07 RX ORDER — ONDANSETRON 2 MG/ML
4 INJECTION INTRAMUSCULAR; INTRAVENOUS ONCE
Status: COMPLETED | OUTPATIENT
Start: 2020-02-08 | End: 2020-02-07

## 2020-02-07 RX ORDER — SODIUM CHLORIDE, SODIUM LACTATE, POTASSIUM CHLORIDE, CALCIUM CHLORIDE 600; 310; 30; 20 MG/100ML; MG/100ML; MG/100ML; MG/100ML
1000 INJECTION, SOLUTION INTRAVENOUS ONCE
Status: COMPLETED | OUTPATIENT
Start: 2020-02-08 | End: 2020-02-08

## 2020-02-07 RX ADMIN — ONDANSETRON 4 MG: 4 TABLET, ORALLY DISINTEGRATING ORAL at 21:30

## 2020-02-07 RX ADMIN — ONDANSETRON 4 MG: 2 INJECTION INTRAMUSCULAR; INTRAVENOUS at 23:30

## 2020-02-07 RX ADMIN — SODIUM CHLORIDE, POTASSIUM CHLORIDE, SODIUM LACTATE AND CALCIUM CHLORIDE 1000 ML: 600; 310; 30; 20 INJECTION, SOLUTION INTRAVENOUS at 23:42

## 2020-02-07 RX ADMIN — FENTANYL CITRATE 50 MCG: 0.05 INJECTION, SOLUTION INTRAMUSCULAR; INTRAVENOUS at 23:42

## 2020-02-08 LAB
APPEARANCE UR: CLEAR
BASOPHILS # BLD AUTO: 0.1 % (ref 0–1.8)
BASOPHILS # BLD: 0.01 K/UL (ref 0–0.12)
BILIRUB UR QL STRIP.AUTO: NEGATIVE
COLOR UR: YELLOW
EOSINOPHIL # BLD AUTO: 0 K/UL (ref 0–0.51)
EOSINOPHIL NFR BLD: 0 % (ref 0–6.9)
ERYTHROCYTE [DISTWIDTH] IN BLOOD BY AUTOMATED COUNT: 46.1 FL (ref 35.9–50)
GLUCOSE UR STRIP.AUTO-MCNC: NEGATIVE MG/DL
HCT VFR BLD AUTO: 33.7 % (ref 37–47)
HGB BLD-MCNC: 11.9 G/DL (ref 12–16)
IMM GRANULOCYTES # BLD AUTO: 0.03 K/UL (ref 0–0.11)
IMM GRANULOCYTES NFR BLD AUTO: 0.4 % (ref 0–0.9)
KETONES UR STRIP.AUTO-MCNC: 40 MG/DL
LACTATE BLD-SCNC: 1.6 MMOL/L (ref 0.5–2)
LEUKOCYTE ESTERASE UR QL STRIP.AUTO: NEGATIVE
LYMPHOCYTES # BLD AUTO: 0.75 K/UL (ref 1–4.8)
LYMPHOCYTES NFR BLD: 8.8 % (ref 22–41)
MCH RBC QN AUTO: 34.6 PG (ref 27–33)
MCHC RBC AUTO-ENTMCNC: 35.3 G/DL (ref 33.6–35)
MCV RBC AUTO: 98 FL (ref 81.4–97.8)
MICRO URNS: ABNORMAL
MONOCYTES # BLD AUTO: 0.16 K/UL (ref 0–0.85)
MONOCYTES NFR BLD AUTO: 1.9 % (ref 0–13.4)
NEUTROPHILS # BLD AUTO: 7.57 K/UL (ref 2–7.15)
NEUTROPHILS NFR BLD: 88.8 % (ref 44–72)
NITRITE UR QL STRIP.AUTO: NEGATIVE
NRBC # BLD AUTO: 0 K/UL
NRBC BLD-RTO: 0 /100 WBC
PH UR STRIP.AUTO: 5.5 [PH] (ref 5–8)
PLATELET # BLD AUTO: 225 K/UL (ref 164–446)
PMV BLD AUTO: 11 FL (ref 9–12.9)
PROT UR QL STRIP: NEGATIVE MG/DL
RBC # BLD AUTO: 3.44 M/UL (ref 4.2–5.4)
RBC UR QL AUTO: NEGATIVE
SP GR UR REFRACTOMETRY: >1.05
UROBILINOGEN UR STRIP.AUTO-MCNC: 0.2 MG/DL
WBC # BLD AUTO: 8.5 K/UL (ref 4.8–10.8)

## 2020-02-08 PROCEDURE — 700101 HCHG RX REV CODE 250: Performed by: SURGERY

## 2020-02-08 PROCEDURE — 770021 HCHG ROOM/CARE - ISO PRIVATE

## 2020-02-08 PROCEDURE — 85025 COMPLETE CBC W/AUTO DIFF WBC: CPT

## 2020-02-08 PROCEDURE — 700111 HCHG RX REV CODE 636 W/ 250 OVERRIDE (IP): Performed by: SURGERY

## 2020-02-08 PROCEDURE — 83605 ASSAY OF LACTIC ACID: CPT

## 2020-02-08 PROCEDURE — 81003 URINALYSIS AUTO W/O SCOPE: CPT

## 2020-02-08 PROCEDURE — 36415 COLL VENOUS BLD VENIPUNCTURE: CPT

## 2020-02-08 PROCEDURE — 700105 HCHG RX REV CODE 258: Performed by: SURGERY

## 2020-02-08 PROCEDURE — 700111 HCHG RX REV CODE 636 W/ 250 OVERRIDE (IP): Performed by: EMERGENCY MEDICINE

## 2020-02-08 PROCEDURE — 700117 HCHG RX CONTRAST REV CODE 255: Performed by: SURGERY

## 2020-02-08 PROCEDURE — 87040 BLOOD CULTURE FOR BACTERIA: CPT | Mod: 91

## 2020-02-08 PROCEDURE — 74177 CT ABD & PELVIS W/CONTRAST: CPT

## 2020-02-08 RX ORDER — OXYCODONE HYDROCHLORIDE 5 MG/1
5 TABLET ORAL EVERY 4 HOURS PRN
Status: DISCONTINUED | OUTPATIENT
Start: 2020-02-08 | End: 2020-02-08

## 2020-02-08 RX ORDER — PROCHLORPERAZINE EDISYLATE 5 MG/ML
10 INJECTION INTRAMUSCULAR; INTRAVENOUS ONCE
Status: COMPLETED | OUTPATIENT
Start: 2020-02-08 | End: 2020-02-08

## 2020-02-08 RX ORDER — HYDROMORPHONE HYDROCHLORIDE 1 MG/ML
0.25 INJECTION, SOLUTION INTRAMUSCULAR; INTRAVENOUS; SUBCUTANEOUS EVERY 4 HOURS PRN
Status: DISCONTINUED | OUTPATIENT
Start: 2020-02-08 | End: 2020-02-08

## 2020-02-08 RX ORDER — SODIUM CHLORIDE 9 MG/ML
INJECTION, SOLUTION INTRAVENOUS CONTINUOUS
Status: DISCONTINUED | OUTPATIENT
Start: 2020-02-08 | End: 2020-02-08

## 2020-02-08 RX ORDER — ONDANSETRON 2 MG/ML
4 INJECTION INTRAMUSCULAR; INTRAVENOUS EVERY 4 HOURS PRN
Status: DISCONTINUED | OUTPATIENT
Start: 2020-02-08 | End: 2020-02-09 | Stop reason: HOSPADM

## 2020-02-08 RX ORDER — OXYCODONE HYDROCHLORIDE 5 MG/1
5 TABLET ORAL EVERY 6 HOURS PRN
Status: DISCONTINUED | OUTPATIENT
Start: 2020-02-08 | End: 2020-02-09 | Stop reason: HOSPADM

## 2020-02-08 RX ORDER — DEXTROSE MONOHYDRATE, SODIUM CHLORIDE, AND POTASSIUM CHLORIDE 50; 1.49; 4.5 G/1000ML; G/1000ML; G/1000ML
INJECTION, SOLUTION INTRAVENOUS CONTINUOUS
Status: DISCONTINUED | OUTPATIENT
Start: 2020-02-08 | End: 2020-02-09 | Stop reason: HOSPADM

## 2020-02-08 RX ORDER — IBUPROFEN 600 MG/1
600 TABLET ORAL EVERY 6 HOURS PRN
Status: DISCONTINUED | OUTPATIENT
Start: 2020-02-08 | End: 2020-02-09 | Stop reason: HOSPADM

## 2020-02-08 RX ORDER — ACETAMINOPHEN 325 MG/1
650 TABLET ORAL EVERY 4 HOURS PRN
Status: DISCONTINUED | OUTPATIENT
Start: 2020-02-08 | End: 2020-02-09 | Stop reason: HOSPADM

## 2020-02-08 RX ADMIN — PROCHLORPERAZINE EDISYLATE 10 MG: 5 INJECTION INTRAMUSCULAR; INTRAVENOUS at 02:35

## 2020-02-08 RX ADMIN — ONDANSETRON 4 MG: 2 INJECTION INTRAMUSCULAR; INTRAVENOUS at 04:50

## 2020-02-08 RX ADMIN — POTASSIUM CHLORIDE, DEXTROSE MONOHYDRATE AND SODIUM CHLORIDE: 150; 5; 450 INJECTION, SOLUTION INTRAVENOUS at 22:22

## 2020-02-08 RX ADMIN — PIPERACILLIN SODIUM AND TAZOBACTAM SODIUM 3.38 G: 3; .375 INJECTION, POWDER, FOR SOLUTION INTRAVENOUS at 03:45

## 2020-02-08 RX ADMIN — IOHEXOL 80 ML: 350 INJECTION, SOLUTION INTRAVENOUS at 00:21

## 2020-02-08 RX ADMIN — HYDROMORPHONE HYDROCHLORIDE 0.25 MG: 1 INJECTION, SOLUTION INTRAMUSCULAR; INTRAVENOUS; SUBCUTANEOUS at 01:02

## 2020-02-08 RX ADMIN — SODIUM CHLORIDE: 9 INJECTION, SOLUTION INTRAVENOUS at 04:50

## 2020-02-08 RX ADMIN — POTASSIUM CHLORIDE, DEXTROSE MONOHYDRATE AND SODIUM CHLORIDE: 150; 5; 450 INJECTION, SOLUTION INTRAVENOUS at 10:45

## 2020-02-08 ASSESSMENT — COGNITIVE AND FUNCTIONAL STATUS - GENERAL
DRESSING REGULAR LOWER BODY CLOTHING: A LITTLE
MOVING FROM LYING ON BACK TO SITTING ON SIDE OF FLAT BED: A LITTLE
SUGGESTED CMS G CODE MODIFIER MOBILITY: CK
MOBILITY SCORE: 19
SUGGESTED CMS G CODE MODIFIER DAILY ACTIVITY: CJ
CLIMB 3 TO 5 STEPS WITH RAILING: A LITTLE
TOILETING: A LITTLE
DAILY ACTIVITIY SCORE: 22
STANDING UP FROM CHAIR USING ARMS: A LITTLE
WALKING IN HOSPITAL ROOM: A LITTLE
MOVING TO AND FROM BED TO CHAIR: A LITTLE

## 2020-02-08 ASSESSMENT — LIFESTYLE VARIABLES
AVERAGE NUMBER OF DAYS PER WEEK YOU HAVE A DRINK CONTAINING ALCOHOL: 0
EVER FELT BAD OR GUILTY ABOUT YOUR DRINKING: NO
ALCOHOL_USE: NO
HAVE YOU EVER FELT YOU SHOULD CUT DOWN ON YOUR DRINKING: NO
CONSUMPTION TOTAL: NEGATIVE
DOES PATIENT WANT TO STOP DRINKING: NO
EVER_SMOKED: NEVER
HOW MANY TIMES IN THE PAST YEAR HAVE YOU HAD 5 OR MORE DRINKS IN A DAY: 0
TOTAL SCORE: 0
ON A TYPICAL DAY WHEN YOU DRINK ALCOHOL HOW MANY DRINKS DO YOU HAVE: 0
TOTAL SCORE: 0
HAVE PEOPLE ANNOYED YOU BY CRITICIZING YOUR DRINKING: NO
EVER HAD A DRINK FIRST THING IN THE MORNING TO STEADY YOUR NERVES TO GET RID OF A HANGOVER: NO
TOTAL SCORE: 0

## 2020-02-08 ASSESSMENT — PATIENT HEALTH QUESTIONNAIRE - PHQ9
2. FEELING DOWN, DEPRESSED, IRRITABLE, OR HOPELESS: NOT AT ALL
1. LITTLE INTEREST OR PLEASURE IN DOING THINGS: NOT AT ALL
SUM OF ALL RESPONSES TO PHQ9 QUESTIONS 1 AND 2: 0

## 2020-02-08 NOTE — PROGRESS NOTES
Received report from NOC RN. Pt A&0x4, resting in bed. Denies N/V/pain at this time. Diet changed to regular, pt only drinking clears currently. Abdomen was soft, flat, BS all 4 quads. Pt noted to be shaking, she explains she feels she is just cold. Pt up self to toilet, steady gait, and fall education reinforced. Pt able to make needs known. Call light and personal items with in reach, states no further needs at this time. Will continue to monitor pt's shaking, have increased room temp and given hot packs.

## 2020-02-08 NOTE — ED TRIAGE NOTES
"Chief Complaint   Patient presents with   • Nausea/Vomiting/Diarrhea   • Abdominal Pain     Pt reports N/V/D on and off for three days but constant for the last 10 hours. Pt 2.5 weeks post op from rectal prolapse surgery and reporting increase in abd pain at location of surgical site and generalized abd cramping. Pt is actively vomiting in triage and appears to be uncomfortable. Pt educated on triage process and placed back in lobby, pt encourage to alert staff with changes in condition.      /83   Pulse (!) 116   Temp (!) 38.2 °C (100.8 °F) (Temporal) Comment: Pt vomited, Oral would not read  Resp 18   Ht 1.651 m (5' 5\")   Wt 49.9 kg (110 lb)   SpO2 94%   BMI 18.30 kg/m²     "

## 2020-02-08 NOTE — CARE PLAN
Problem: Communication  Goal: The ability to communicate needs accurately and effectively will improve  Outcome: PROGRESSING AS EXPECTED     Problem: Safety  Goal: Will remain free from injury  Outcome: PROGRESSING AS EXPECTED     Problem: Bowel/Gastric:  Goal: Normal bowel function is maintained or improved  Outcome: PROGRESSING AS EXPECTED  Goal: Will not experience complications related to bowel motility  Outcome: PROGRESSING AS EXPECTED

## 2020-02-08 NOTE — PROGRESS NOTES
No c-diff sample sent yet, patient states she doesn't go enough to be able to send a BM sample. Patient adamantly requesting this RN to page MD to ask to DC, even though no sample sent yet.

## 2020-02-08 NOTE — H&P
Surgery General History & Physical Note    Date  2/7/2020    Primary Care Physician  ENEDINA Vaughan.    CC  Abdominal pain, nausea vomiting    HPI  This is a 64 y.o. female who underwent a robotic low anterior resection and rectopexy on January 21, 2020 for rectal prolapse.  She presented to the ER with worsening abdominal pain and nausea vomiting since discharge from hospital.  The symptoms have significantly worsened over the past 3 days, especially today.  She was having some intermittent nausea and vomiting but was able to tolerate a diet until today where she has not been able to keep any fluids down and her abdominal pain has worsened.  She was reporting passage of gas and only small bowel movements until today where she started having watery diarrhea.  She describes her pain as a generalized pain in the pelvis mostly posterior and bilateral.  She is noted to have a fever and tachycardia in the ER.    Past Medical History:   Diagnosis Date   • Anemia    • Blood transfusion without reported diagnosis    • Urinary bladder disorder     hx of intersitial cystitis x 20 years       Past Surgical History:   Procedure Laterality Date   • PB LAP, SURG PROCTOPEXY  1/21/2020    Procedure: RECTOPEXY, ROBOT-ASSISTED, LAPAROSCOPIC, USING DA [place]IR[place]IE XI;  Surgeon: Daniel Palacio M.D.;  Location: SURGERY [facility];  Service: General   • LOW ANTERIOR RESECTION ROBOTIC XI  1/21/2020    Procedure: RESECTION, LOW ANTERIOR, ROBOT-ASSISTED, LAPAROSCOPIC, USING DA [place]IR[place]IE XI;  Surgeon: Daniel Palacio M.D.;  Location: SURGERY [facility];  Service: General       No current facility-administered medications for this encounter.      Current Outpatient Medications   Medication Sig Dispense Refill   • ALPRAZolam (XANAX) 0.5 MG Tab Take 0.5 mg by mouth 3 times a day as needed for Sleep.     • tizanidine (ZANAFLEX) 4 MG Tab Take 4 mg by mouth every 6 hours as needed.     • sumatriptan (IMITREX) 100 MG tablet Take  100 mg by mouth 1 time daily as needed.         Social History     Socioeconomic History   • Marital status:      Spouse name: Not on file   • Number of children: Not on file   • Years of education: Not on file   • Highest education level: Not on file   Occupational History   • Not on file   Social Needs   • Financial resource strain: Not on file   • Food insecurity:     Worry: Not on file     Inability: Not on file   • Transportation needs:     Medical: Not on file     Non-medical: Not on file   Tobacco Use   • Smoking status: Never Smoker   • Smokeless tobacco: Never Used   Substance and Sexual Activity   • Alcohol use: Not Currently   • Drug use: Not Currently   • Sexual activity: Not Currently   Lifestyle   • Physical activity:     Days per week: Not on file     Minutes per session: Not on file   • Stress: Not on file   Relationships   • Social connections:     Talks on phone: Not on file     Gets together: Not on file     Attends [employer] service: Not on file     Active member of club or organization: Not on file     Attends meetings of clubs or organizations: Not on file     Relationship status: Not on file   • Intimate partner violence:     Fear of current or ex partner: Not on file     Emotionally abused: Not on file     Physically abused: Not on file     Forced sexual activity: Not on file   Other Topics Concern   • Not on file   Social History Narrative   • Not on file       Family History   Problem Relation Age of Onset   • Hypertension Mother    • Hypertension Father        Allergies  Sulfa drugs    Review of Systems  Negative except for As documented in HPI    Physical Exam  Constitutional:       Appearance: Normal appearance. She is normal weight. She is not ill-appearing, toxic-appearing or diaphoretic.   HENT:      Head: Normocephalic and atraumatic.      Nose: Nose normal.      Mouth/Throat:      Mouth: Mucous membranes are dry.   Eyes:      General: No scleral icterus.      Conjunctiva/sclera: Conjunctivae normal.      Pupils: Pupils are equal, round, and reactive to light.   Neck:      Musculoskeletal: Normal range of motion and neck supple.   Cardiovascular:      Rate and Rhythm: Regular rhythm. Tachycardia present.      Heart sounds: Normal heart sounds.   Pulmonary:      Breath sounds: Normal breath sounds.   Abdominal:      General: Abdomen is flat. There is no distension.      Palpations: Abdomen is soft. There is no mass.      Tenderness: There is tenderness. There is no right CVA tenderness, left CVA tenderness, guarding or rebound.      Hernia: No hernia is present.      Comments: Mildly tender, worse over lower abdomen, incisions clean dry and intact, soft, no distention, no peritonitis   Neurological:      Mental Status: She is alert.         Vital Signs  Blood Pressure: 129/83   Temperature: (!) 38.2 °C (100.8 °F)(Pt vomited, Oral would not read)   Pulse: (!) 116   Respiration: 18   Pulse Oximetry: 94 %       Labs:  Recent Labs     02/07/20 2129   WBC 8.7   RBC 4.01*   HEMOGLOBIN 13.4   HEMATOCRIT 39.8   MCV 99.3*   MCH 33.4*   MCHC 33.7   RDW 47.8   PLATELETCT 275   MPV 10.9     Recent Labs     02/07/20 2129   SODIUM 143   POTASSIUM 3.4*   CHLORIDE 106   CO2 24   GLUCOSE 137*   BUN 10   CREATININE 0.92   CALCIUM 9.5         Recent Labs     02/07/20 2129   ASTSGOT 18   ALTSGPT 12   TBILIRUBIN 0.6   ALKPHOSPHAT 86   GLOBULIN 2.8       Radiology:  CT-ABDOMEN-PELVIS WITH   Final Result         1. Wall thickening in the distal sigmoid colon distal to the anastomosis with adjacent presacral stranding and fluid. The differential includes postsurgical change versus colitis/proctitis.      DX-CHEST-PORTABLE (1 VIEW)   Final Result         1. No acute cardiopulmonary abnormalities are identified.            Assessment/Plan:  64-year-old female who is 2-1/2 weeks postop from a robotic low anterior resection and rectopexy for rectal prolapse.  She presents with worsening abdominal  pain and significant nausea and vomiting with new onset diarrhea.  Her blood work is mostly unremarkable and her exam is negative for an acute abdomen.    Plan:  -Admission to Dr. Palacio  -N.p.o., IV fluids, analgesia, antiemetics  -One-time dose of Zosyn  -CT scan ordered  -Will reassess following CT scan    Addendum:  - CT reviewed - non-specific colonic/rectal thickening  - Continue Zosyn for fever  - Follow-up on c diff

## 2020-02-08 NOTE — ED NOTES
Assist RN note: PIV established. Pt c/o severe nausea at this time. Awaiting ERP eval. Call light in reach.

## 2020-02-08 NOTE — PROGRESS NOTES
2 RN Skin Check    Pt skin is intact over all bony prominences. No redness noted.     Pt has multiple previous, healing incision wounds on abdomen. The edges are approximated, no redness noted.

## 2020-02-08 NOTE — ED NOTES
Break RN note: Pt ambulatory to BR with steady gait. Pt comfort level checked, denies needs at this time. Updated on POC. Call light in reach.

## 2020-02-08 NOTE — ED PROVIDER NOTES
"ED Provider Note    CHIEF COMPLAINT  Chief Complaint   Patient presents with   • Nausea/Vomiting/Diarrhea   • Abdominal Pain       HPI  Carlee Zavala is a 64 y.o. female who presents with nausea, vomiting, diarrhea, abdominal pain.  She states that she had a surgery by Dr. Palacio in late January on 1/21/2020.  She had a robotic proctopexy.  She has a history of chronic fecal incontinence and diarrhea.  Continues to have diarrhea.  Now today with nausea and vomiting.  She states that she has had chronic abdominal pain since her recent surgery with Dr. Palacio.  No complications during surgery however notes chronic pain since then.  She states that the pain is diffuse and is associated with diarrhea and vomiting.  Has a history of interstitial cystitis.  No chest pain or trouble breathing.  No dysuria or hematuria though notes that she has not been able to urinate all day today.  She attributed it to dehydration.    REVIEW OF SYSTEMS  See HPI for further details. All other systems are negative.     PAST MEDICAL HISTORY   has a past medical history of Anemia, Blood transfusion without reported diagnosis, and Urinary bladder disorder.    SOCIAL HISTORY  Social History     Tobacco Use   • Smoking status: Never Smoker   • Smokeless tobacco: Never Used   Substance and Sexual Activity   • Alcohol use: Not Currently   • Drug use: Not Currently   • Sexual activity: Not Currently       SURGICAL HISTORY   has a past surgical history that includes lap, surg proctopexy (1/21/2020) and low anterior resection robotic xi (1/21/2020).    CURRENT MEDICATIONS  Home Medications    **Home medications have not yet been reviewed for this encounter**         ALLERGIES  Allergies   Allergen Reactions   • Sulfa Drugs      hives       PHYSICAL EXAM  VITAL SIGNS: /83   Pulse (!) 116   Temp (!) 38.2 °C (100.8 °F) (Temporal) Comment: Pt vomited, Oral would not read  Resp 18   Ht 1.651 m (5' 5\")   Wt 49.9 kg (110 lb)   SpO2 94%   " "BMI 18.30 kg/m²   Pulse ox interpretation: I interpret this pulse ox as normal.  Constitutional: Alert in no apparent distress.  HENT: No signs of trauma, Bilateral external ears normal, Nose normal.   Eyes: Pupils are equal and reactive, Conjunctiva normal, Non-icteric.   Neck: Normal range of motion, No tenderness, Supple, No stridor.   Cardiovascular: Regular rate and rhythm.   Thorax & Lungs: Normal breath sounds, No respiratory distress, No wheezing, No chest tenderness.   Abdomen: Bowel sounds normal, Soft, mild diffuse tenderness, No masses, No pulsatile masses. No peritoneal signs.  Surgical wounds healing well without any complications.  No dehiscence, erythema, discharge.  Skin: Warm, Dry, No erythema, No rash.   Back: No bony tenderness, No CVA tenderness.   Extremities: Intact distal pulses, No edema, No tenderness, No cyanosis  Neurologic: Alert, Normal motor function and gait, Normal sensory function, No focal deficits noted.   Psychiatric: Affect normal, Judgment normal, Mood normal.       DIAGNOSTIC STUDIES / PROCEDURES    EKG - Physician interpretation      LABS  Labs Reviewed   CBC WITH DIFFERENTIAL - Abnormal; Notable for the following components:       Result Value    RBC 4.01 (*)     MCV 99.3 (*)     MCH 33.4 (*)     Neutrophils-Polys 86.80 (*)     Lymphocytes 10.40 (*)     Neutrophils (Absolute) 7.54 (*)     Lymphs (Absolute) 0.90 (*)     All other components within normal limits   COMP METABOLIC PANEL - Abnormal; Notable for the following components:    Potassium 3.4 (*)     Anion Gap 13.0 (*)     Glucose 137 (*)     All other components within normal limits   URINALYSIS,CULTURE IF INDICATED - Abnormal; Notable for the following components:    Ketones 40 (*)     All other components within normal limits   LIPASE   ESTIMATED GFR   BLOOD CULTURE    Narrative:     Special Contact Isolation  Per Hospital Policy: Only change Specimen Src: to \"Line\" if  specified by physician order.   BLOOD CULTURE " "   Narrative:     Special Contact Isolation  Per Hospital Policy: Only change Specimen Src: to \"Line\" if  specified by physician order.   LACTIC ACID    Narrative:     Special Contact Isolation   REFRACTOMETER SG   CDIFF BY PCR RFLX TOXIN   CBC WITH DIFFERENTIAL         RADIOLOGY  CT-ABDOMEN-PELVIS WITH   Final Result         1. Wall thickening in the distal sigmoid colon distal to the anastomosis with adjacent presacral stranding and fluid. The differential includes postsurgical change versus colitis/proctitis.      DX-CHEST-PORTABLE (1 VIEW)   Final Result         1. No acute cardiopulmonary abnormalities are identified.            COURSE & MEDICAL DECISION MAKING    Medications   Pharmacy Consult Request (has no administration in time range)   ondansetron (ZOFRAN) syringe/vial injection 4 mg (has no administration in time range)   acetaminophen (TYLENOL) tablet 650 mg (has no administration in time range)   ibuprofen (MOTRIN) tablet 600 mg (has no administration in time range)   oxyCODONE immediate-release (ROXICODONE) tablet 5 mg (has no administration in time range)   HYDROmorphone pf (DILAUDID) injection 0.25 mg (0.25 mg Intravenous Given 2/8/20 0102)   NS infusion (has no administration in time range)   piperacillin-tazobactam (ZOSYN) 3.375 g in  mL IVPB (has no administration in time range)   ondansetron (ZOFRAN ODT) dispertab 4 mg (4 mg Oral Given 2/7/20 2130)   lactated ringers infusion (BOLUS) (0 mL Intravenous Stopped 2/8/20 0224)   fentaNYL (SUBLIMAZE) injection 50 mcg (50 mcg Intravenous Given 2/7/20 2342)   ondansetron (ZOFRAN) syringe/vial injection 4 mg (4 mg Intravenous Given 2/7/20 2330)   iohexol (OMNIPAQUE) 350 mg/mL (80 mL Intravenous Given 2/8/20 0021)   prochlorperazine (COMPAZINE) injection 10 mg (10 mg Intravenous Given 2/8/20 0235)       Pertinent Labs & Imaging studies reviewed. (See chart for details)  64 y.o. female presenting with diffuse abdominal pain and vomiting and " "persistent diarrhea following recent proctopexy by Dr. Palacio late last month approximately 3 weeks ago.  She has followed up with her surgeon on the third.    The patient has diffuse abdominal pain and tenderness on physical examination.  Fever and tachycardia.  No leukocytosis.  Concern for possible complication from surgery such as intra-abdominal abscess or anastomosis leak and CT abdomen pelvis was ordered for further evaluation.  Was found to have wall thickening and felt to have postsurgical changes versus colitis/proctitis evidence on CT.  No evidence of pneumonia.  No evidence of urinary tract infection.  Patient was given a empiric dose of Zosyn for potential intra-abdominal source of infection and will be monitored in the hospital.    The patient was evaluated in the emergency department by general surgery, Dr. Vázquez.  She will take the patient primarily to her service for further treatment and evaluation.      /68   Pulse 96   Temp 36.8 °C (98.2 °F) (Temporal)   Resp 14   Ht 1.651 m (5' 5\")   Wt 49.9 kg (110 lb)   SpO2 96%   BMI 18.30 kg/m²       FINAL IMPRESSION  1. Post-op pain    2. Nausea, vomiting, and diarrhea    3. Proctitis    4. Coloproctitis            Electronically signed by: Max Bosch M.D., 2/7/2020 11:18 PM    "

## 2020-02-08 NOTE — PROGRESS NOTES
2/8  Pt seen and examined, admitted overnight with diarrhea and nausea/emesis at home.  On presentation to the ED she was noted to have fever and tachycardia.  CT was completed and no evidence of anastomotic leak or abscess was noted.  This morning, symptoms are improved and she wants to try some fluids PO.    C-Diff still pending  No recurrent prolapse present  Ongoing soreness at incisions    Abdomen benign, no focal tenderness or peritonitis  Wounds c/d/i    No surgical complication identified, unclear source of diarrhea, although she has had a couple episodes of this over the last 2 weeks.  C-dff pending.   Will stop IV Abx as no source of infection identified  2.5 weeks out from surgery, shouldn't need IV narcotics either  Will follow bowel function and oral intake today, continue IVF until she is able to maintain PO intake.  Encouraged ambulation

## 2020-02-09 VITALS
RESPIRATION RATE: 17 BRPM | HEART RATE: 86 BPM | BODY MASS INDEX: 18.33 KG/M2 | HEIGHT: 65 IN | WEIGHT: 110 LBS | DIASTOLIC BLOOD PRESSURE: 83 MMHG | OXYGEN SATURATION: 95 % | TEMPERATURE: 99.2 F | SYSTOLIC BLOOD PRESSURE: 161 MMHG

## 2020-02-09 LAB
ERYTHROCYTE [DISTWIDTH] IN BLOOD BY AUTOMATED COUNT: 46.6 FL (ref 35.9–50)
HCT VFR BLD AUTO: 36 % (ref 37–47)
HGB BLD-MCNC: 12.6 G/DL (ref 12–16)
MCH RBC QN AUTO: 34.7 PG (ref 27–33)
MCHC RBC AUTO-ENTMCNC: 35 G/DL (ref 33.6–35)
MCV RBC AUTO: 99.2 FL (ref 81.4–97.8)
PLATELET # BLD AUTO: 226 K/UL (ref 164–446)
PMV BLD AUTO: 11.3 FL (ref 9–12.9)
RBC # BLD AUTO: 3.63 M/UL (ref 4.2–5.4)
WBC # BLD AUTO: 14.3 K/UL (ref 4.8–10.8)

## 2020-02-09 PROCEDURE — 85027 COMPLETE CBC AUTOMATED: CPT

## 2020-02-09 PROCEDURE — 36415 COLL VENOUS BLD VENIPUNCTURE: CPT

## 2020-02-09 RX ORDER — CIPROFLOXACIN 500 MG/1
500 TABLET, FILM COATED ORAL 2 TIMES DAILY
Qty: 14 TAB | Refills: 0 | Status: SHIPPED | OUTPATIENT
Start: 2020-02-09 | End: 2020-04-16

## 2020-02-09 RX ORDER — METRONIDAZOLE 500 MG/1
500 TABLET ORAL 3 TIMES DAILY
Qty: 21 TAB | Refills: 0 | Status: SHIPPED | OUTPATIENT
Start: 2020-02-09 | End: 2020-04-16

## 2020-02-09 NOTE — DISCHARGE INSTRUCTIONS
Discharge Instructions    Discharged to home by car with relative. Discharged via walking, hospital escort: Refused.  Special equipment needed: Not Applicable    Be sure to schedule a follow-up appointment with your primary care doctor or any specialists as instructed.     Discharge Plan:   Diet Plan: Discussed  Activity Level: Discussed  Confirmed Follow up Appointment: Patient to Call and Schedule Appointment  Confirmed Symptoms Management: Discussed  Medication Reconciliation Updated: Yes  Influenza Vaccine Indication: Not indicated: Previously immunized this influenza season and > 8 years of age    I understand that a diet low in cholesterol, fat, and sodium is recommended for good health. Unless I have been given specific instructions below for another diet, I accept this instruction as my diet prescription.   Other diet: regular    Special Instructions: None    · Is patient discharged on Warfarin / Coumadin?   No     Depression / Suicide Risk    As you are discharged from this [facility] facility, it is important to learn how to keep safe from harming yourself.    Recognize the warning signs:  · Abrupt changes in personality, positive or negative- including increase in energy   · Giving away possessions  · Change in eating patterns- significant weight changes-  positive or negative  · Change in sleeping patterns- unable to sleep or sleeping all the time   · Unwillingness or inability to communicate  · Depression  · Unusual sadness, discouragement and loneliness  · Talk of wanting to die  · Neglect of personal appearance   · Rebelliousness- reckless behavior  · Withdrawal from people/activities they love  · Confusion- inability to concentrate     If you or a loved one observes any of these behaviors or has concerns about self-harm, here's what you can do:  · Talk about it- your feelings and reasons for harming yourself  · Remove any means that you might use to hurt yourself (examples: pills, rope, extension  cords, firearm)  · Get professional help from the community (Mental Health, Substance Abuse, psychological counseling)  · Do not be alone:Call your Safe Contact- someone whom you trust who will be there for you.  · Call your local CRISIS HOTLINE 282-5977 or 837-920-6052  · Call your local Children's Mobile Crisis Response Team [place] (269) 565-6283 or www.[employer]  · Call the toll free National Suicide Prevention Hotlines   · National Suicide Prevention Lifeline 214-502-VUEW (8392)  · National Hope Line Network 800-SUICIDE (570-0500)

## 2020-02-09 NOTE — CARE PLAN
"  Problem: Communication  Goal: The ability to communicate needs accurately and effectively will improve  Outcome: PROGRESSING AS EXPECTED   Education provided on use of call light. Pt demonstrates understanding by using call light appropriately.     Problem: Safety  Goal: Will remain free from falls  Outcome: PROGRESSING AS EXPECTED   Call light within reach, bed locked in low position, personal belongings within reach, treaded socks on, all needs met at this time.     Problem: Discharge Barriers/Planning  Goal: Patient's continuum of care needs will be met  Outcome: PROGRESSING AS EXPECTED   Patient educated on fluid intake. Educated on collection of stool to send to lab however patient has discarded due to \"urine got mixed into it\"  "

## 2020-02-09 NOTE — PROGRESS NOTES
2/9  Pt seen and examined, still with loose stools overnight, but appetite is far better and she is ambulating in her room.  Pain is controlled as well without narcotics and she denies any further emesis at this time.      Exam benign, no focal tenderness and wounds are c/d/i  WBC 14.3 today  C-Diff still pending, no sample yet    At this point, I would ideally like to complete the C-Diff test and see how she does with PO intake today; but she is adamant about going home immediately.  A course of abx with Cipro/Flagyl might be beneficial, and I have recommended she follow up again in clinic this week.  Encouraged her to continue to push PO intake, particularly fluids.

## 2020-02-09 NOTE — DISCHARGE SUMMARY
Discharge Summary      DATE OF ADMISSION: 2/7/2020    DATE OF DISCHARGE: 2/9/20    ADMISSION DIAGNOSIS (ES):  ? Abdominal Pain, Nausea    DISCHARGE DIAGNOSIS (ES):  ? same    DISCHARGE CONDITION:  ? stable    CONSULTATIONS:  ? none    PROCEDURES:  ? none    BRIEF HPI:  ? 64y F admitted 2 weeks post-op with nausea and emesis.  She was found to have no evidence of intra-abdominal infection or surgical issue and admitted for observation.      HOSPITAL COURSE:  ? During her hospital stay, she was able to ambulate, tolerate PO, and pain was controlled without narcotics.  She had ongoing loose stools, and eventually a sample was obtained and sent for C-Diff.  She had an elevated WBC off abx and was started on PO Abx as well while waiting for testing results.  At the time of discharge home she was ambulating, tolerating diet, pain controlled  ? Pt was recommended to stay another day to make sure her intake was sufficient off abx, but refused this and insisted on discharge on 2/9    MEDS:   Current Outpatient Medications   Medication Sig Dispense Refill   • ciprofloxacin (CIPRO) 500 MG Tab Take 1 Tab by mouth 2 times a day. 14 Tab 0   • metroNIDAZOLE (FLAGYL) 500 MG Tab Take 1 Tab by mouth 3 times a day. 21 Tab 0       FOLLOW-UP:  ? Please call my office at [phone] to make an appointment in 3 days    DISCHARGE INSTRUCTIONS:

## 2020-02-09 NOTE — PROGRESS NOTES
Still no stool sample. Patient stated no more than smear BM today.   Only able to drink sparkling water, not eating anything else. Requested Magic Cup to eat/drink for extra calories, kitchen called, order placed so patient will try to eat/drink more than sparkling water.

## 2020-02-09 NOTE — PROGRESS NOTES
Pt requesting to leave, MD aware. Patient discharged to home per physician order. Discharged with relative. Educated on follow up appointments, home medicaitons, prescriptions, home care and nursing care instructions for monitoring fluid volume. Discussed prescriptions. Ambulating self, voiding adequately, pt denies pain. O2 saturation >90% on RA. Tolerating regular diet. Pt informed of several reasons to return to ED. All questions answered. Belongings with pt on discharge. IV removed. Cdiff sample sent to lab per orders prior to discharge.

## 2020-02-13 LAB
BACTERIA BLD CULT: NORMAL
BACTERIA BLD CULT: NORMAL
SIGNIFICANT IND 70042: NORMAL
SIGNIFICANT IND 70042: NORMAL
SITE SITE: NORMAL
SITE SITE: NORMAL
SOURCE SOURCE: NORMAL
SOURCE SOURCE: NORMAL

## 2020-03-09 ENCOUNTER — HOSPITAL ENCOUNTER (OUTPATIENT)
Dept: LAB | Facility: MEDICAL CENTER | Age: 65
End: 2020-03-09
Attending: NURSE PRACTITIONER
Payer: COMMERCIAL

## 2020-03-09 DIAGNOSIS — R29.6 FREQUENT FALLS: ICD-10-CM

## 2020-03-09 DIAGNOSIS — R26.89 BALANCE PROBLEM: ICD-10-CM

## 2020-03-09 DIAGNOSIS — Z11.59 NEED FOR HEPATITIS C SCREENING TEST: ICD-10-CM

## 2020-03-09 LAB
BASOPHILS # BLD AUTO: 0.6 % (ref 0–1.8)
BASOPHILS # BLD: 0.05 K/UL (ref 0–0.12)
EOSINOPHIL # BLD AUTO: 0.01 K/UL (ref 0–0.51)
EOSINOPHIL NFR BLD: 0.1 % (ref 0–6.9)
ERYTHROCYTE [DISTWIDTH] IN BLOOD BY AUTOMATED COUNT: 47.2 FL (ref 35.9–50)
HCT VFR BLD AUTO: 41.8 % (ref 37–47)
HGB BLD-MCNC: 14.1 G/DL (ref 12–16)
IMM GRANULOCYTES # BLD AUTO: 0.02 K/UL (ref 0–0.11)
IMM GRANULOCYTES NFR BLD AUTO: 0.3 % (ref 0–0.9)
LYMPHOCYTES # BLD AUTO: 1.8 K/UL (ref 1–4.8)
LYMPHOCYTES NFR BLD: 22.7 % (ref 22–41)
MCH RBC QN AUTO: 34.4 PG (ref 27–33)
MCHC RBC AUTO-ENTMCNC: 33.7 G/DL (ref 33.6–35)
MCV RBC AUTO: 102 FL (ref 81.4–97.8)
MONOCYTES # BLD AUTO: 0.51 K/UL (ref 0–0.85)
MONOCYTES NFR BLD AUTO: 6.4 % (ref 0–13.4)
NEUTROPHILS # BLD AUTO: 5.55 K/UL (ref 2–7.15)
NEUTROPHILS NFR BLD: 69.9 % (ref 44–72)
NRBC # BLD AUTO: 0 K/UL
NRBC BLD-RTO: 0 /100 WBC
PLATELET # BLD AUTO: 180 K/UL (ref 164–446)
PMV BLD AUTO: 12 FL (ref 9–12.9)
RBC # BLD AUTO: 4.1 M/UL (ref 4.2–5.4)
WBC # BLD AUTO: 7.9 K/UL (ref 4.8–10.8)

## 2020-03-09 PROCEDURE — 36415 COLL VENOUS BLD VENIPUNCTURE: CPT

## 2020-03-09 PROCEDURE — 80061 LIPID PANEL: CPT

## 2020-03-09 PROCEDURE — 86803 HEPATITIS C AB TEST: CPT

## 2020-03-09 PROCEDURE — 82306 VITAMIN D 25 HYDROXY: CPT

## 2020-03-09 PROCEDURE — 82607 VITAMIN B-12: CPT

## 2020-03-09 PROCEDURE — 84439 ASSAY OF FREE THYROXINE: CPT

## 2020-03-09 PROCEDURE — 84443 ASSAY THYROID STIM HORMONE: CPT

## 2020-03-09 PROCEDURE — 85025 COMPLETE CBC W/AUTO DIFF WBC: CPT

## 2020-03-09 PROCEDURE — 80053 COMPREHEN METABOLIC PANEL: CPT

## 2020-03-10 LAB
25(OH)D3 SERPL-MCNC: 53 NG/ML (ref 30–100)
ALBUMIN SERPL BCP-MCNC: 4.9 G/DL (ref 3.2–4.9)
ALBUMIN/GLOB SERPL: 2 G/DL
ALP SERPL-CCNC: 81 U/L (ref 30–99)
ALT SERPL-CCNC: 12 U/L (ref 2–50)
ANION GAP SERPL CALC-SCNC: 10 MMOL/L (ref 0–11.9)
AST SERPL-CCNC: 20 U/L (ref 12–45)
BILIRUB SERPL-MCNC: 0.6 MG/DL (ref 0.1–1.5)
BUN SERPL-MCNC: 11 MG/DL (ref 8–22)
CALCIUM SERPL-MCNC: 10.3 MG/DL (ref 8.5–10.5)
CHLORIDE SERPL-SCNC: 103 MMOL/L (ref 96–112)
CHOLEST SERPL-MCNC: 198 MG/DL (ref 100–199)
CO2 SERPL-SCNC: 27 MMOL/L (ref 20–33)
CREAT SERPL-MCNC: 0.99 MG/DL (ref 0.5–1.4)
FASTING STATUS PATIENT QL REPORTED: NORMAL
GLOBULIN SER CALC-MCNC: 2.5 G/DL (ref 1.9–3.5)
GLUCOSE SERPL-MCNC: 103 MG/DL (ref 65–99)
HCV AB SER QL: NEGATIVE
HDLC SERPL-MCNC: 49 MG/DL
LDLC SERPL CALC-MCNC: 117 MG/DL
POTASSIUM SERPL-SCNC: 3.7 MMOL/L (ref 3.6–5.5)
PROT SERPL-MCNC: 7.4 G/DL (ref 6–8.2)
SODIUM SERPL-SCNC: 140 MMOL/L (ref 135–145)
T4 FREE SERPL-MCNC: 0.8 NG/DL (ref 0.53–1.43)
TRIGL SERPL-MCNC: 159 MG/DL (ref 0–149)
TSH SERPL DL<=0.005 MIU/L-ACNC: 1.31 UIU/ML (ref 0.38–5.33)
VIT B12 SERPL-MCNC: 829 PG/ML (ref 211–911)

## 2020-04-03 ENCOUNTER — HOSPITAL ENCOUNTER (OUTPATIENT)
Dept: RADIOLOGY | Facility: MEDICAL CENTER | Age: 65
End: 2020-04-03
Payer: COMMERCIAL

## 2020-04-16 ENCOUNTER — TELEMEDICINE (OUTPATIENT)
Dept: MEDICAL GROUP | Facility: PHYSICIAN GROUP | Age: 65
End: 2020-04-16
Payer: COMMERCIAL

## 2020-04-16 VITALS — WEIGHT: 105 LBS | HEIGHT: 65 IN | BODY MASS INDEX: 17.49 KG/M2

## 2020-04-16 DIAGNOSIS — N30.10 INTERSTITIAL CYSTITIS: ICD-10-CM

## 2020-04-16 DIAGNOSIS — M54.9 CHRONIC BACK PAIN, UNSPECIFIED BACK LOCATION, UNSPECIFIED BACK PAIN LATERALITY: Chronic | ICD-10-CM

## 2020-04-16 DIAGNOSIS — R09.89 RUNNY NOSE: Chronic | ICD-10-CM

## 2020-04-16 DIAGNOSIS — G89.29 CHRONIC BACK PAIN, UNSPECIFIED BACK LOCATION, UNSPECIFIED BACK PAIN LATERALITY: Chronic | ICD-10-CM

## 2020-04-16 DIAGNOSIS — G43.009 MIGRAINE WITHOUT AURA AND WITHOUT STATUS MIGRAINOSUS, NOT INTRACTABLE: Chronic | ICD-10-CM

## 2020-04-16 PROCEDURE — 99204 OFFICE O/P NEW MOD 45 MIN: CPT | Mod: 95,CR | Performed by: INTERNAL MEDICINE

## 2020-04-16 ASSESSMENT — FIBROSIS 4 INDEX: FIB4 SCORE: 2.05

## 2020-04-16 NOTE — ASSESSMENT & PLAN NOTE
This is a chronic and stable condition.  The patient presently taking Imitrex as needed.  Presently she is asymptomatic.

## 2020-04-16 NOTE — ASSESSMENT & PLAN NOTE
This is a chronic and stable condition.  The patient was seen previously by private urologist.  She was again given different medication and the only one that seemed to work well is alprazolam which she takes only as needed.  Patient is presently followed by a private physician for this condition

## 2020-04-16 NOTE — PROGRESS NOTES
This encounter was conducted via Zoom .   Verbal consent was obtained. Patient's identity was verified.    -------------------------------------------------------------------------------    CC: Recurrent runny nose  Follow-up migraine    HPI: This is a 64 y.o. Pt presents to [facility].   Pt's medical history is notable for:    Interstitial cystitis  This is a chronic and stable condition.  The patient was seen previously by private urologist.  She was again given different medication and the only one that seemed to work well is alprazolam which she takes only as needed.  Patient is presently followed by a private physician for this condition    Migraine without aura and without status migrainosus, not intractable  This is a chronic and stable condition.  The patient presently taking Imitrex as needed.  Presently she is asymptomatic.    Chronic back pain  This is a chronic and stable condition.  The patient takes tizanidine as needed.      Runny nose  This is a chronic condition.  Patient has tried fluticasone nasal spray without improvement.  She has also try Allegra again without success.  She denies any fever or chills.  She has taken Sudafed which seem to help partially.  Patient also noted intermittent sneezing and runny nose.  He denied cough shortness of breath or wheezing.              REVIEW OF SYSTEMS:  Constitutional:  no fever / chills   Eyes: no changes in vision  ENT: no sore throat, no hearing loss  CV:  no chest pain, no palpitations  Pulmonary: no SOB, no cough    GI: no nausea / vomiting, no diarrhea, no constipation, no rectal bleeding   :  no dysuria, no hematuria   Skin: no rash         Allergies: Sulfa drugs    Current Outpatient Medications Ordered in Epic   Medication Sig Dispense Refill   • ALPRAZolam (XANAX) 0.5 MG Tab Take 0.5 mg by mouth 3 times a day as needed for Sleep.     • sumatriptan (IMITREX) 100 MG tablet Take 100 mg by mouth 1 time daily as needed.     • tizanidine  (ZANAFLEX) 4 MG Tab Take 4 mg by mouth every 6 hours as needed.       No current Epic-ordered facility-administered medications on file.        Past Medical History:   Diagnosis Date   • Anemia    • Blood transfusion without reported diagnosis    • Urinary bladder disorder     hx of intersitial cystitis x 20 years        Past Surgical History:   Procedure Laterality Date   • PB LAP, SURG PROCTOPEXY  1/21/2020    Procedure: RECTOPEXY, ROBOT-ASSISTED, LAPAROSCOPIC, USING DA [place] XI;  Surgeon: Daniel Palacio M.D.;  Location: SURGERY [facility];  Service: General   • LOW ANTERIOR RESECTION ROBOTIC XI  1/21/2020    Procedure: RESECTION, LOW ANTERIOR, ROBOT-ASSISTED, LAPAROSCOPIC, USING DA [place] XI;  Surgeon: Daniel Palacio M.D.;  Location: SURGERY [facility];  Service: General        Family History   Problem Relation Age of Onset   • Hypertension Mother    • Hypertension Father         Social History     Tobacco Use   Smoking Status Never Smoker   Smokeless Tobacco Never Used          Social History     Substance and Sexual Activity   Alcohol Use Not Currently        ---------------------------------------------------------------------      Physical Exam:  Psych:  A&O x 3, mood and affect appropiate   Constitutional: no distress  Skin: No rashes in visible areas.  Eye: Round. Conjunctiva clear, lids normal.   ENMT: Lips without lesions. Phonation normal.  Neck: No obvious masses visible, no thyromegaly.   Respiratory: Unlabored respiratory effort, no cough or audible wheeze  ---------------------------------------------------------------------    ASSESSMENT and PLAN:    1. Migraine without aura and without status migrainosus, not intractable  This is a chronic and stable condition.  Presently the patient asymptomatic.  Recommend the patient to continue take Imitrex as needed.    2. Interstitial cystitis  This is a chronic and stable condition.  Continue with current management.  Advised the patient  continue follow-up with her private urologist as directed.    3. Chronic back pain, unspecified back location, unspecified back pain laterality  This is a chronic and stable condition.  Continue with current management.  Advised the patient to continue with regular stretching exercises.  Continue with tizanidine as needed for muscle spasm.    4. Runny nose  This is a chronic condition.  Ideally the patient should be evaluated in person however due to COVID 19 situation the patient declined to come in for evaluation at this time.  Patient stated that she has try Allegra and fluticasone without improvement.  Sudafed has helped partially which she will continue take as needed during the day.  Advised the patient that she could also try Benadryl at bedtime.  Recommend nasal irrigation.  Consider referring the patient to ENT for further evaluation if no improvement noted.           Return in about 6 months (around 10/16/2020) for routine followup.     PATIENT EDUCATION:  -If any problems should arise, patient was advised to contact our office or go to ER to be evaluated.  -The pertinent assessment and plans of care were discussed with the patient. Patient verbalized understanding.  -Advised pt to follow a healthy diet and regular aerobic exercise regimen. Advised pt to avoid alcohol and tobacco use.    Please note that this dictation was created using voice recognition software. I have made every reasonable attempt to correct obvious errors, but it is possible there are errors of grammar and possibly content that I did not discover before finalizing the note.

## 2020-04-16 NOTE — ASSESSMENT & PLAN NOTE
This is a chronic condition.  Patient has tried fluticasone nasal spray without improvement.  She has also try Allegra again without success.  She denies any fever or chills.  She has taken Sudafed which seem to help partially.  Patient also noted intermittent sneezing and runny nose.  He denied cough shortness of breath or wheezing.

## 2020-06-01 ENCOUNTER — PATIENT MESSAGE (OUTPATIENT)
Dept: MEDICAL GROUP | Facility: LAB | Age: 65
End: 2020-06-01

## 2020-06-01 ENCOUNTER — HOSPITAL ENCOUNTER (OUTPATIENT)
Dept: RADIOLOGY | Facility: MEDICAL CENTER | Age: 65
End: 2020-06-01
Attending: NURSE PRACTITIONER
Payer: COMMERCIAL

## 2020-06-01 DIAGNOSIS — M81.0 AGE RELATED OSTEOPOROSIS, UNSPECIFIED PATHOLOGICAL FRACTURE PRESENCE: ICD-10-CM

## 2020-06-01 DIAGNOSIS — Z12.39 ENCOUNTER FOR SCREENING FOR MALIGNANT NEOPLASM OF BREAST: ICD-10-CM

## 2020-06-01 PROCEDURE — 77080 DXA BONE DENSITY AXIAL: CPT

## 2020-06-01 PROCEDURE — 77067 SCR MAMMO BI INCL CAD: CPT

## 2020-06-01 NOTE — RESULT ENCOUNTER NOTE
Can we call patient and let her know that she needs to f/u with her new PCP regarding her Dexa results.  Thank You,  Ashlee

## 2020-06-03 DIAGNOSIS — M81.0 AGE RELATED OSTEOPOROSIS, UNSPECIFIED PATHOLOGICAL FRACTURE PRESENCE: ICD-10-CM

## 2020-06-03 PROBLEM — R92.30 DENSE BREAST: Status: ACTIVE | Noted: 2020-06-03

## 2020-06-03 PROBLEM — R92.2 DENSE BREAST: Status: ACTIVE | Noted: 2020-06-03

## 2020-06-03 RX ORDER — ALENDRONATE SODIUM 70 MG/1
70 TABLET ORAL
Qty: 15 TAB | Refills: 3 | Status: SHIPPED | OUTPATIENT
Start: 2020-06-03 | End: 2020-06-15

## 2020-06-15 ENCOUNTER — APPOINTMENT (OUTPATIENT)
Dept: RADIOLOGY | Facility: MEDICAL CENTER | Age: 65
DRG: 389 | End: 2020-06-15
Attending: EMERGENCY MEDICINE
Payer: COMMERCIAL

## 2020-06-15 ENCOUNTER — TELEPHONE (OUTPATIENT)
Dept: HEALTH INFORMATION MANAGEMENT | Facility: OTHER | Age: 65
End: 2020-06-15

## 2020-06-15 ENCOUNTER — HOSPITAL ENCOUNTER (INPATIENT)
Facility: MEDICAL CENTER | Age: 65
LOS: 2 days | DRG: 389 | End: 2020-06-17
Attending: EMERGENCY MEDICINE | Admitting: HOSPITALIST
Payer: COMMERCIAL

## 2020-06-15 DIAGNOSIS — E86.0 DEHYDRATION: ICD-10-CM

## 2020-06-15 DIAGNOSIS — K56.609 SMALL BOWEL OBSTRUCTION (HCC): ICD-10-CM

## 2020-06-15 DIAGNOSIS — R11.2 NON-INTRACTABLE VOMITING WITH NAUSEA, UNSPECIFIED VOMITING TYPE: ICD-10-CM

## 2020-06-15 DIAGNOSIS — N17.9 AKI (ACUTE KIDNEY INJURY) (HCC): ICD-10-CM

## 2020-06-15 DIAGNOSIS — E87.29 HIGH ANION GAP METABOLIC ACIDOSIS: ICD-10-CM

## 2020-06-15 DIAGNOSIS — R10.84 GENERALIZED ABDOMINAL PAIN: ICD-10-CM

## 2020-06-15 LAB
ALBUMIN SERPL BCP-MCNC: 5.7 G/DL (ref 3.2–4.9)
ALBUMIN/GLOB SERPL: 1.7 G/DL
ALP SERPL-CCNC: 121 U/L (ref 30–99)
ALT SERPL-CCNC: 23 U/L (ref 2–50)
ANION GAP SERPL CALC-SCNC: 21 MMOL/L (ref 7–16)
APPEARANCE UR: CLEAR
AST SERPL-CCNC: 26 U/L (ref 12–45)
BACTERIA #/AREA URNS HPF: NEGATIVE /HPF
BASOPHILS # BLD AUTO: 0.4 % (ref 0–1.8)
BASOPHILS # BLD: 0.05 K/UL (ref 0–0.12)
BILIRUB SERPL-MCNC: 0.9 MG/DL (ref 0.1–1.5)
BILIRUB UR QL STRIP.AUTO: NEGATIVE
BUN SERPL-MCNC: 46 MG/DL (ref 8–22)
CALCIUM SERPL-MCNC: 10.7 MG/DL (ref 8.5–10.5)
CHLORIDE SERPL-SCNC: 90 MMOL/L (ref 96–112)
CO2 SERPL-SCNC: 25 MMOL/L (ref 20–33)
COLOR UR: YELLOW
CREAT SERPL-MCNC: 1.88 MG/DL (ref 0.5–1.4)
EKG IMPRESSION: NORMAL
EOSINOPHIL # BLD AUTO: 0.36 K/UL (ref 0–0.51)
EOSINOPHIL NFR BLD: 3.1 % (ref 0–6.9)
EPI CELLS #/AREA URNS HPF: ABNORMAL /HPF
ERYTHROCYTE [DISTWIDTH] IN BLOOD BY AUTOMATED COUNT: 43.5 FL (ref 35.9–50)
GLOBULIN SER CALC-MCNC: 3.3 G/DL (ref 1.9–3.5)
GLUCOSE SERPL-MCNC: 123 MG/DL (ref 65–99)
GLUCOSE UR STRIP.AUTO-MCNC: NEGATIVE MG/DL
HCT VFR BLD AUTO: 48.6 % (ref 37–47)
HGB BLD-MCNC: 16.7 G/DL (ref 12–16)
HYALINE CASTS #/AREA URNS LPF: ABNORMAL /LPF
IMM GRANULOCYTES # BLD AUTO: 0.05 K/UL (ref 0–0.11)
IMM GRANULOCYTES NFR BLD AUTO: 0.4 % (ref 0–0.9)
KETONES UR STRIP.AUTO-MCNC: 15 MG/DL
LEUKOCYTE ESTERASE UR QL STRIP.AUTO: NEGATIVE
LIPASE SERPL-CCNC: 21 U/L (ref 11–82)
LYMPHOCYTES # BLD AUTO: 1.72 K/UL (ref 1–4.8)
LYMPHOCYTES NFR BLD: 14.8 % (ref 22–41)
MAGNESIUM SERPL-MCNC: 2.2 MG/DL (ref 1.5–2.5)
MCH RBC QN AUTO: 33.6 PG (ref 27–33)
MCHC RBC AUTO-ENTMCNC: 34.4 G/DL (ref 33.6–35)
MCV RBC AUTO: 97.8 FL (ref 81.4–97.8)
MICRO URNS: ABNORMAL
MONOCYTES # BLD AUTO: 0.76 K/UL (ref 0–0.85)
MONOCYTES NFR BLD AUTO: 6.5 % (ref 0–13.4)
NEUTROPHILS # BLD AUTO: 8.72 K/UL (ref 2–7.15)
NEUTROPHILS NFR BLD: 74.8 % (ref 44–72)
NITRITE UR QL STRIP.AUTO: NEGATIVE
NRBC # BLD AUTO: 0 K/UL
NRBC BLD-RTO: 0 /100 WBC
PH UR STRIP.AUTO: 5 [PH] (ref 5–8)
PLATELET # BLD AUTO: 247 K/UL (ref 164–446)
PMV BLD AUTO: 10.9 FL (ref 9–12.9)
POTASSIUM SERPL-SCNC: 4 MMOL/L (ref 3.6–5.5)
PROT SERPL-MCNC: 9 G/DL (ref 6–8.2)
PROT UR QL STRIP: 100 MG/DL
RBC # BLD AUTO: 4.97 M/UL (ref 4.2–5.4)
RBC # URNS HPF: ABNORMAL /HPF
RBC UR QL AUTO: NEGATIVE
SODIUM SERPL-SCNC: 136 MMOL/L (ref 135–145)
SP GR UR STRIP.AUTO: 1.03
UROBILINOGEN UR STRIP.AUTO-MCNC: 0.2 MG/DL
WBC # BLD AUTO: 11.7 K/UL (ref 4.8–10.8)
WBC #/AREA URNS HPF: ABNORMAL /HPF

## 2020-06-15 PROCEDURE — 83735 ASSAY OF MAGNESIUM: CPT

## 2020-06-15 PROCEDURE — 80053 COMPREHEN METABOLIC PANEL: CPT

## 2020-06-15 PROCEDURE — 700111 HCHG RX REV CODE 636 W/ 250 OVERRIDE (IP): Performed by: EMERGENCY MEDICINE

## 2020-06-15 PROCEDURE — 700105 HCHG RX REV CODE 258: Performed by: EMERGENCY MEDICINE

## 2020-06-15 PROCEDURE — 770006 HCHG ROOM/CARE - MED/SURG/GYN SEMI*

## 2020-06-15 PROCEDURE — 304538 HCHG NG TUBE

## 2020-06-15 PROCEDURE — 99223 1ST HOSP IP/OBS HIGH 75: CPT | Performed by: HOSPITALIST

## 2020-06-15 PROCEDURE — 74176 CT ABD & PELVIS W/O CONTRAST: CPT

## 2020-06-15 PROCEDURE — 93005 ELECTROCARDIOGRAM TRACING: CPT | Performed by: EMERGENCY MEDICINE

## 2020-06-15 PROCEDURE — 83690 ASSAY OF LIPASE: CPT

## 2020-06-15 PROCEDURE — 85025 COMPLETE CBC W/AUTO DIFF WBC: CPT

## 2020-06-15 PROCEDURE — 700101 HCHG RX REV CODE 250: Performed by: EMERGENCY MEDICINE

## 2020-06-15 PROCEDURE — 96374 THER/PROPH/DIAG INJ IV PUSH: CPT

## 2020-06-15 PROCEDURE — 81001 URINALYSIS AUTO W/SCOPE: CPT

## 2020-06-15 PROCEDURE — 700105 HCHG RX REV CODE 258: Performed by: HOSPITALIST

## 2020-06-15 PROCEDURE — 93005 ELECTROCARDIOGRAM TRACING: CPT

## 2020-06-15 PROCEDURE — 96375 TX/PRO/DX INJ NEW DRUG ADDON: CPT

## 2020-06-15 PROCEDURE — 99285 EMERGENCY DEPT VISIT HI MDM: CPT

## 2020-06-15 RX ORDER — PROMETHAZINE HYDROCHLORIDE 12.5 MG/1
12.5-25 SUPPOSITORY RECTAL EVERY 4 HOURS PRN
Status: DISCONTINUED | OUTPATIENT
Start: 2020-06-15 | End: 2020-06-17 | Stop reason: HOSPADM

## 2020-06-15 RX ORDER — POLYETHYLENE GLYCOL 3350 17 G/17G
1 POWDER, FOR SOLUTION ORAL
Status: DISCONTINUED | OUTPATIENT
Start: 2020-06-15 | End: 2020-06-15

## 2020-06-15 RX ORDER — ONDANSETRON 4 MG/1
4 TABLET, ORALLY DISINTEGRATING ORAL EVERY 4 HOURS PRN
Status: DISCONTINUED | OUTPATIENT
Start: 2020-06-15 | End: 2020-06-17 | Stop reason: HOSPADM

## 2020-06-15 RX ORDER — PROMETHAZINE HYDROCHLORIDE 25 MG/1
12.5-25 TABLET ORAL EVERY 4 HOURS PRN
Status: DISCONTINUED | OUTPATIENT
Start: 2020-06-15 | End: 2020-06-17 | Stop reason: HOSPADM

## 2020-06-15 RX ORDER — AMOXICILLIN 250 MG
2 CAPSULE ORAL 2 TIMES DAILY
Status: DISCONTINUED | OUTPATIENT
Start: 2020-06-15 | End: 2020-06-15

## 2020-06-15 RX ORDER — BISACODYL 10 MG
10 SUPPOSITORY, RECTAL RECTAL
Status: DISCONTINUED | OUTPATIENT
Start: 2020-06-15 | End: 2020-06-15

## 2020-06-15 RX ORDER — MORPHINE SULFATE 4 MG/ML
4 INJECTION, SOLUTION INTRAMUSCULAR; INTRAVENOUS ONCE
Status: COMPLETED | OUTPATIENT
Start: 2020-06-15 | End: 2020-06-15

## 2020-06-15 RX ORDER — ONDANSETRON 2 MG/ML
4 INJECTION INTRAMUSCULAR; INTRAVENOUS EVERY 4 HOURS PRN
Status: DISCONTINUED | OUTPATIENT
Start: 2020-06-15 | End: 2020-06-17 | Stop reason: HOSPADM

## 2020-06-15 RX ORDER — METOCLOPRAMIDE HYDROCHLORIDE 5 MG/ML
10 INJECTION INTRAMUSCULAR; INTRAVENOUS EVERY 6 HOURS
Status: DISCONTINUED | OUTPATIENT
Start: 2020-06-15 | End: 2020-06-15

## 2020-06-15 RX ORDER — SODIUM CHLORIDE, SODIUM LACTATE, POTASSIUM CHLORIDE, CALCIUM CHLORIDE 600; 310; 30; 20 MG/100ML; MG/100ML; MG/100ML; MG/100ML
INJECTION, SOLUTION INTRAVENOUS CONTINUOUS
Status: DISCONTINUED | OUTPATIENT
Start: 2020-06-15 | End: 2020-06-16

## 2020-06-15 RX ORDER — PROCHLORPERAZINE EDISYLATE 5 MG/ML
5-10 INJECTION INTRAMUSCULAR; INTRAVENOUS EVERY 4 HOURS PRN
Status: DISCONTINUED | OUTPATIENT
Start: 2020-06-15 | End: 2020-06-17 | Stop reason: HOSPADM

## 2020-06-15 RX ORDER — SODIUM CHLORIDE, SODIUM LACTATE, POTASSIUM CHLORIDE, CALCIUM CHLORIDE 600; 310; 30; 20 MG/100ML; MG/100ML; MG/100ML; MG/100ML
1000 INJECTION, SOLUTION INTRAVENOUS ONCE
Status: COMPLETED | OUTPATIENT
Start: 2020-06-15 | End: 2020-06-15

## 2020-06-15 RX ORDER — SODIUM CHLORIDE, SODIUM LACTATE, POTASSIUM CHLORIDE, CALCIUM CHLORIDE 600; 310; 30; 20 MG/100ML; MG/100ML; MG/100ML; MG/100ML
INJECTION, SOLUTION INTRAVENOUS CONTINUOUS
Status: DISCONTINUED | OUTPATIENT
Start: 2020-06-15 | End: 2020-06-15

## 2020-06-15 RX ORDER — ACETAMINOPHEN 325 MG/1
650 TABLET ORAL EVERY 6 HOURS PRN
Status: DISCONTINUED | OUTPATIENT
Start: 2020-06-15 | End: 2020-06-17 | Stop reason: HOSPADM

## 2020-06-15 RX ORDER — METOCLOPRAMIDE HYDROCHLORIDE 5 MG/ML
10 INJECTION INTRAMUSCULAR; INTRAVENOUS ONCE
Status: COMPLETED | OUTPATIENT
Start: 2020-06-15 | End: 2020-06-15

## 2020-06-15 RX ORDER — LIDOCAINE HYDROCHLORIDE 20 MG/ML
15 SOLUTION OROPHARYNGEAL ONCE
Status: COMPLETED | OUTPATIENT
Start: 2020-06-15 | End: 2020-06-15

## 2020-06-15 RX ADMIN — LIDOCAINE HYDROCHLORIDE 15 ML: 20 SOLUTION ORAL; TOPICAL at 16:00

## 2020-06-15 RX ADMIN — SODIUM CHLORIDE, POTASSIUM CHLORIDE, SODIUM LACTATE AND CALCIUM CHLORIDE 1000 ML: 600; 310; 30; 20 INJECTION, SOLUTION INTRAVENOUS at 13:48

## 2020-06-15 RX ADMIN — MORPHINE SULFATE 4 MG: 4 INJECTION INTRAVENOUS at 13:48

## 2020-06-15 RX ADMIN — METOCLOPRAMIDE 10 MG: 5 INJECTION, SOLUTION INTRAMUSCULAR; INTRAVENOUS at 13:54

## 2020-06-15 RX ADMIN — SODIUM CHLORIDE, POTASSIUM CHLORIDE, SODIUM LACTATE AND CALCIUM CHLORIDE: 600; 310; 30; 20 INJECTION, SOLUTION INTRAVENOUS at 18:20

## 2020-06-15 SDOH — ECONOMIC STABILITY: TRANSPORTATION INSECURITY
IN THE PAST 12 MONTHS, HAS THE LACK OF TRANSPORTATION KEPT YOU FROM MEDICAL APPOINTMENTS OR FROM GETTING MEDICATIONS?: NO

## 2020-06-15 SDOH — ECONOMIC STABILITY: FOOD INSECURITY: WITHIN THE PAST 12 MONTHS, THE FOOD YOU BOUGHT JUST DIDN'T LAST AND YOU DIDN'T HAVE MONEY TO GET MORE.: NEVER TRUE

## 2020-06-15 SDOH — ECONOMIC STABILITY: TRANSPORTATION INSECURITY
IN THE PAST 12 MONTHS, HAS LACK OF TRANSPORTATION KEPT YOU FROM MEETINGS, WORK, OR FROM GETTING THINGS NEEDED FOR DAILY LIVING?: NO

## 2020-06-15 SDOH — ECONOMIC STABILITY: FOOD INSECURITY: WITHIN THE PAST 12 MONTHS, YOU WORRIED THAT YOUR FOOD WOULD RUN OUT BEFORE YOU GOT MONEY TO BUY MORE.: NEVER TRUE

## 2020-06-15 ASSESSMENT — ENCOUNTER SYMPTOMS
SHORTNESS OF BREATH: 0
BACK PAIN: 0
BLOOD IN STOOL: 0
SINUS PAIN: 0
BLURRED VISION: 0
PND: 0
DIARRHEA: 0
CHILLS: 1
FALLS: 0
STRIDOR: 0
TINGLING: 0
WEIGHT LOSS: 1
CHILLS: 0
DEPRESSION: 0
DIZZINESS: 0
EYE PAIN: 0
PALPITATIONS: 0
POLYDIPSIA: 0
SORE THROAT: 0
FLANK PAIN: 0
SPUTUM PRODUCTION: 0
BRUISES/BLEEDS EASILY: 0
FEVER: 0
DOUBLE VISION: 0
ORTHOPNEA: 0
HEMOPTYSIS: 0
HEADACHES: 0
PHOTOPHOBIA: 0
ABDOMINAL PAIN: 1
CONSTIPATION: 0
NAUSEA: 1
HEARTBURN: 0
CLAUDICATION: 0
VOMITING: 1
COUGH: 0
MYALGIAS: 0
HALLUCINATIONS: 0
WHEEZING: 0
NECK PAIN: 0
TREMORS: 0
DIAPHORESIS: 0
WEAKNESS: 0

## 2020-06-15 ASSESSMENT — LIFESTYLE VARIABLES
HAVE YOU EVER FELT YOU SHOULD CUT DOWN ON YOUR DRINKING: NO
TOTAL SCORE: 0
CONSUMPTION TOTAL: NEGATIVE
EVER_SMOKED: NEVER
SUBSTANCE_ABUSE: 0
EVER FELT BAD OR GUILTY ABOUT YOUR DRINKING: NO
TOTAL SCORE: 0
DOES PATIENT WANT TO STOP DRINKING: NO
AVERAGE NUMBER OF DAYS PER WEEK YOU HAVE A DRINK CONTAINING ALCOHOL: 0
HAVE PEOPLE ANNOYED YOU BY CRITICIZING YOUR DRINKING: NO
TOTAL SCORE: 0
ON A TYPICAL DAY WHEN YOU DRINK ALCOHOL HOW MANY DRINKS DO YOU HAVE: 0
ALCOHOL_USE: NO
HOW MANY TIMES IN THE PAST YEAR HAVE YOU HAD 5 OR MORE DRINKS IN A DAY: 0
EVER HAD A DRINK FIRST THING IN THE MORNING TO STEADY YOUR NERVES TO GET RID OF A HANGOVER: NO

## 2020-06-15 ASSESSMENT — COPD QUESTIONNAIRES
COPD SCREENING SCORE: 2
DURING THE PAST 4 WEEKS HOW MUCH DID YOU FEEL SHORT OF BREATH: NONE/LITTLE OF THE TIME
IN THE PAST 12 MONTHS DO YOU DO LESS THAN YOU USED TO BECAUSE OF YOUR BREATHING PROBLEMS: DISAGREE/UNSURE
HAVE YOU SMOKED AT LEAST 100 CIGARETTES IN YOUR ENTIRE LIFE: NO/DON'T KNOW
DO YOU EVER COUGH UP ANY MUCUS OR PHLEGM?: NO/ONLY WITH OCCASIONAL COLDS OR INFECTIONS

## 2020-06-15 ASSESSMENT — COGNITIVE AND FUNCTIONAL STATUS - GENERAL
MOBILITY SCORE: 24
SUGGESTED CMS G CODE MODIFIER DAILY ACTIVITY: CH
SUGGESTED CMS G CODE MODIFIER MOBILITY: CH
DAILY ACTIVITIY SCORE: 24

## 2020-06-15 ASSESSMENT — FIBROSIS 4 INDEX
FIB4 SCORE: 1.4
FIB4 SCORE: 2.05

## 2020-06-15 NOTE — ED NOTES
Pt laying in bed. Pt states nausea and pain have decreased respirations even and unlabored. call light within reach. side rails up x 2 for safety. pt has no questions or complaints at this time. Family at bedside.

## 2020-06-15 NOTE — ED TRIAGE NOTES
Chief Complaint   Patient presents with   • Abdominal Pain     low abdomen x3 days   • N/V     x3 days   • Sent by MD     surgical procedure in January 2020, recent hospitalization patient reports abx administration and unknown diagnosis.     Patient to triage via ambulation, with a steady gait, patient A&O x4.      Mask applied, verbalized understanding.    Explained wait time and triage process to pt. Pt placed back in lobby, told to notify ED tech or triage RN of any changes, verbalized understanding.

## 2020-06-15 NOTE — H&P
Hospital Medicine History & Physical Note    Date of Service  6/15/2020    Primary Care Physician  Erlin Colón M.D.    Code Status  Full Code    Chief Complaint  Chief Complaint   Patient presents with   • Abdominal Pain     low abdomen x3 days   • N/V     x3 days   • Sent by MD     surgical procedure in January 2020, recent hospitalization patient reports abx administration and unknown diagnosis.       History of Presenting Illness  64 y.o. female who presented 6/15/2020 with past medical history of bowel obstruction, anemia who comes into the emergency room with complaints of abdominal pain, nausea and vomiting for the past 4 days.  Describes the abdominal pain as epigastric radiating down to the left lower quadrant.  It feels like a squeezing feeling.  Patient had a rectal prolapse surgery in January.  Afterwards she had 2 episodes of bowel obstruction.  She states that have resolved one time and second time she never went to the hospital.  This time it continues to get worse.    Patient arrived to the hospital with normal vital signs.  CT scan found a small bowel obstruction with transition to the left hemipelvis possibly due to adhesions.  Review of Systems  Review of Systems   Constitutional: Negative for chills, diaphoresis, fever and malaise/fatigue.   HENT: Negative for congestion, ear discharge, ear pain, hearing loss, nosebleeds, sinus pain, sore throat and tinnitus.    Eyes: Negative for blurred vision, double vision, photophobia and pain.   Respiratory: Negative for cough, hemoptysis, sputum production, shortness of breath, wheezing and stridor.    Cardiovascular: Negative for chest pain, palpitations, orthopnea, claudication, leg swelling and PND.   Gastrointestinal: Positive for abdominal pain, nausea and vomiting. Negative for blood in stool, constipation, diarrhea, heartburn and melena.   Genitourinary: Negative for dysuria, flank pain, frequency, hematuria and urgency.   Musculoskeletal: Negative  for back pain, falls, joint pain, myalgias and neck pain.   Skin: Negative for itching and rash.   Neurological: Negative for dizziness, tingling, tremors, weakness and headaches.   Endo/Heme/Allergies: Negative for environmental allergies and polydipsia. Does not bruise/bleed easily.   Psychiatric/Behavioral: Negative for depression, hallucinations, substance abuse and suicidal ideas.       Past Medical History   has a past medical history of Anemia, Blood transfusion without reported diagnosis, and Urinary bladder disorder.    Surgical History   has a past surgical history that includes pr lap, surg proctopexy (1/21/2020) and low anterior resection robotic xi (1/21/2020).     Family History  family history includes Hypertension in her father and mother.     Social History   reports that she has never smoked. She has never used smokeless tobacco. She reports previous alcohol use. She reports previous drug use.    Allergies  Allergies   Allergen Reactions   • Sulfa Drugs      hives       Medications  Prior to Admission Medications   Prescriptions Last Dose Informant Patient Reported? Taking?   ALPRAZolam (XANAX) 0.5 MG Tab 6/14/2020 at PM Patient Yes No   Sig: Take 0.5 mg by mouth 3 times a day as needed for Anxiety.   Doxylamine Succinate, Sleep, (SLEEP AID PO) 6/14/2020 at PM Patient Yes Yes   Sig: Take 2 Tabs by mouth every bedtime.   sumatriptan (IMITREX) 100 MG tablet PRN at PRN Patient Yes No   Sig: Take 100 mg by mouth 1 time daily as needed.   tizanidine (ZANAFLEX) 4 MG Tab 6/14/2020 at PM Patient Yes No   Sig: Take 4 mg by mouth every 6 hours as needed.      Facility-Administered Medications: None       Physical Exam  Temp:  [36.8 °C (98.3 °F)-37.4 °C (99.3 °F)] 36.8 °C (98.3 °F)  Pulse:  [] 98  Resp:  [18-20] 18  BP: (123-164)/(68-93) 160/74  SpO2:  [93 %-97 %] 93 %    Physical Exam  Vitals signs and nursing note reviewed.   Constitutional:       General: She is not in acute distress.     Appearance:  Normal appearance. She is not ill-appearing, toxic-appearing or diaphoretic.   HENT:      Head: Normocephalic and atraumatic.      Nose: No congestion or rhinorrhea.      Mouth/Throat:      Pharynx: No oropharyngeal exudate or posterior oropharyngeal erythema.   Eyes:      General: No scleral icterus.  Neck:      Musculoskeletal: No neck rigidity or muscular tenderness.      Vascular: No carotid bruit.   Cardiovascular:      Rate and Rhythm: Normal rate and regular rhythm.      Pulses: Normal pulses.      Heart sounds: Normal heart sounds. No murmur. No friction rub. No gallop.    Pulmonary:      Effort: Pulmonary effort is normal. No respiratory distress.      Breath sounds: Normal breath sounds. No stridor. No wheezing or rhonchi.   Abdominal:      General: Abdomen is flat. Bowel sounds are normal. There is no distension.      Palpations: There is no mass.      Tenderness: There is abdominal tenderness. There is guarding. There is no left CVA tenderness or rebound.      Hernia: No hernia is present.   Musculoskeletal: Normal range of motion.         General: No swelling.      Right lower leg: No edema.      Left lower leg: No edema.   Lymphadenopathy:      Cervical: No cervical adenopathy.   Skin:     General: Skin is warm and dry.      Capillary Refill: Capillary refill takes more than 3 seconds.      Coloration: Skin is not jaundiced or pale.      Findings: No bruising or erythema.   Neurological:      Mental Status: She is alert.         Laboratory:  Recent Labs     06/15/20  1236   WBC 11.7*   RBC 4.97   HEMOGLOBIN 16.7*   HEMATOCRIT 48.6*   MCV 97.8   MCH 33.6*   MCHC 34.4   RDW 43.5   PLATELETCT 247   MPV 10.9     Recent Labs     06/15/20  1236   SODIUM 136   POTASSIUM 4.0   CHLORIDE 90*   CO2 25   GLUCOSE 123*   BUN 46*   CREATININE 1.88*   CALCIUM 10.7*     Recent Labs     06/15/20  1236   ALTSGPT 23   ASTSGOT 26   ALKPHOSPHAT 121*   TBILIRUBIN 0.9   LIPASE 21   GLUCOSE 123*         No results for  input(s): NTPROBNP in the last 72 hours.      No results for input(s): TROPONINT in the last 72 hours.    Imaging:  DX-ABDOMEN FOR TUBE PLACEMENT   Final Result         Gastric drainage tube with tip projecting over the expected area of the stomach.      CT-ABDOMEN-PELVIS W/O   Final Result      1.  Small bowel obstruction, with transition in the left hemipelvis, presumably due to adhesions.   2.  Unchanged sclerotic lesions in the L5 vertebral body and in the bony pelvis, indeterminate. If there is history of malignancy, they may represent bony metastases that are stable since February 2020. Evaluation with bone scan can be considered.            Assessment/Plan:      * SBO (small bowel obstruction) ([facility])  Assessment & Plan  Patient does have history of multiple colonoscopies without malignancy  Keep n.p.o.  NG tube in place  IV hydration  Monitor potassium  Serial abdominal examinations  Antinausea medication  Surgery is consulted-no surgical interventions planned      Acute renal failure (ARF) (HCC)  Assessment & Plan  Likely prerenal  IV fluid hydration with normal saline  Monitor BMP and assess response  Avoid IV contrast/nephrotoxins/NSAIDs  Dose adjust meds for decreased GFR        Osteoporosis- (present on admission)  Assessment & Plan  Patient had a DEXA scan earlier this month that found osteoporosis L5 region  Patient is on Fosamax  Further evaluation is needed I would order a MRI of the L-spine to further evaluate this

## 2020-06-15 NOTE — ED PROVIDER NOTES
ED Provider Note    ED Provider Note    Primary care provider: Erlin Colón M.D.  Means of arrival: POV  History obtained from: patient  History limited by: None    CHIEF COMPLAINT  Chief Complaint   Patient presents with   • Abdominal Pain     low abdomen x3 days   • N/V     x3 days   • Sent by MD     surgical procedure in January 2020, recent hospitalization patient reports abx administration and unknown diagnosis.       EVNANCIO Zavala is a 64 y.o. female who presents to the Emergency Department with her  with a chief complaint of abdominal pain, nausea vomiting.  This is been going on since Friday evening.  She believes that she is lost weight.  This is the time it happened the last 5 months approximately.  She was in her normal state of health until last year, when she was having what sounds like, stool incontinence.  Did not have insurance at the time.  In November of last year, she was able to obtain insurance and subsequently got caught up on multiple things including Pap smear, mammogram.  She was able to see GI.  She was able to see a surgeon.  She was diagnosed with rectal prolapse and proctitis.  She underwent surgery with Dr. Palacio, January 21.  Things went well and she was discharged home.  However, mid February of this year, she was admitted with symptoms similar, to that which she is having today, nausea vomiting dehydration.  She was admitted to the hospital.  She was improved and then another 10 days or so later, she had return of symptoms.  She followed up with Dr. Vázquez, Dr. Palacio's partner, hospitalization was recommended but the patient refused and then she got better and reportedly did well for most of March April and May until now.  She has not had a fever.  She reports primarily, production of bile in her vomitus.  She is had very little to eat over the last few days.  Denies chest pain or shortness of breath.  Urine output has been decreased.    REVIEW OF SYSTEMS  Review  "of Systems   Constitutional: Positive for chills and weight loss. Negative for fever.   HENT: Negative for congestion.    Respiratory: Negative for cough and shortness of breath.    Cardiovascular: Negative for chest pain.   Gastrointestinal: Positive for abdominal pain, nausea and vomiting.   Genitourinary: Negative for dysuria and urgency.   Musculoskeletal: Negative for back pain.   Skin: Negative for rash.   Neurological: Negative for dizziness and headaches.   All other systems reviewed and are negative.      PAST MEDICAL HISTORY   has a past medical history of Anemia, Blood transfusion without reported diagnosis, and Urinary bladder disorder.    SURGICAL HISTORY   has a past surgical history that includes lap, surg proctopexy (1/21/2020) and low anterior resection robotic xi (1/21/2020).    SOCIAL HISTORY  Social History     Tobacco Use   • Smoking status: Never Smoker   • Smokeless tobacco: Never Used   Substance Use Topics   • Alcohol use: Not Currently   • Drug use: Not Currently      Social History     Substance and Sexual Activity   Drug Use Not Currently       FAMILY HISTORY  Family History   Problem Relation Age of Onset   • Hypertension Mother    • Hypertension Father        CURRENT MEDICATIONS  Home Medications     Reviewed by Maile Evans (Pharmacy Tech) on 06/15/20 at 1426  Med List Status: Complete   Medication Last Dose Status   ALPRAZolam (XANAX) 0.5 MG Tab 6/14/2020 Active   Doxylamine Succinate, Sleep, (SLEEP AID PO) 6/14/2020 Active   sumatriptan (IMITREX) 100 MG tablet PRN Active   tizanidine (ZANAFLEX) 4 MG Tab 6/14/2020 Active                ALLERGIES  Allergies   Allergen Reactions   • Sulfa Drugs      hives       PHYSICAL EXAM  VITAL SIGNS: /83   Pulse (!) 105   Temp 37.2 °C (98.9 °F) (Temporal)   Resp 20   Ht 1.651 m (5' 5\")   Wt 46.3 kg (102 lb)   SpO2 97%   BMI 16.97 kg/m²   Vitals reviewed.  Constitutional: Patient is oriented to person, place, and time. Appears " well-developed and well-nourished. Moderate distress.    Head: Normocephalic and atraumatic.   Ears: Normal external ears bilaterally.   Mouth/Throat: Oropharynx is clear with very dry lips and dry MM.  no exudates.   Eyes: Conjunctivae are normal. Pupils are equal, round, and reactive to light.   Neck: Normal range of motion. Neck supple.  Cardiovascular: Tachycardia, regular rhythm and normal heart sounds. Normal peripheral pulses.  Pulmonary/Chest: Effort normal and breath sounds normal. No respiratory distress, no wheezes, rhonchi, or rales. No chest wall tenderness.  Abdominal: Soft. Bowel sounds are normal. There are a well-healed robotic surgical wounds.  There is diffuse tenderness tenderness. No rebound or guarding, or peritoneal signs. No CVA tenderness.  Musculoskeletal: No edema and no tenderness.   Neurological: No focal deficits.   Skin: Skin is warm and dry. No erythema. No pallor.   Psychiatric: Patient has a normal mood and affect.     LABS  Results for orders placed or performed during the hospital encounter of 06/15/20   CBC WITH DIFFERENTIAL   Result Value Ref Range    WBC 11.7 (H) 4.8 - 10.8 K/uL    RBC 4.97 4.20 - 5.40 M/uL    Hemoglobin 16.7 (H) 12.0 - 16.0 g/dL    Hematocrit 48.6 (H) 37.0 - 47.0 %    MCV 97.8 81.4 - 97.8 fL    MCH 33.6 (H) 27.0 - 33.0 pg    MCHC 34.4 33.6 - 35.0 g/dL    RDW 43.5 35.9 - 50.0 fL    Platelet Count 247 164 - 446 K/uL    MPV 10.9 9.0 - 12.9 fL    Neutrophils-Polys 74.80 (H) 44.00 - 72.00 %    Lymphocytes 14.80 (L) 22.00 - 41.00 %    Monocytes 6.50 0.00 - 13.40 %    Eosinophils 3.10 0.00 - 6.90 %    Basophils 0.40 0.00 - 1.80 %    Immature Granulocytes 0.40 0.00 - 0.90 %    Nucleated RBC 0.00 /100 WBC    Neutrophils (Absolute) 8.72 (H) 2.00 - 7.15 K/uL    Lymphs (Absolute) 1.72 1.00 - 4.80 K/uL    Monos (Absolute) 0.76 0.00 - 0.85 K/uL    Eos (Absolute) 0.36 0.00 - 0.51 K/uL    Baso (Absolute) 0.05 0.00 - 0.12 K/uL    Immature Granulocytes (abs) 0.05 0.00 - 0.11  K/uL    NRBC (Absolute) 0.00 K/uL   COMP METABOLIC PANEL   Result Value Ref Range    Sodium 136 135 - 145 mmol/L    Potassium 4.0 3.6 - 5.5 mmol/L    Chloride 90 (L) 96 - 112 mmol/L    Co2 25 20 - 33 mmol/L    Anion Gap 21.0 (H) 7.0 - 16.0    Glucose 123 (H) 65 - 99 mg/dL    Bun 46 (H) 8 - 22 mg/dL    Creatinine 1.88 (H) 0.50 - 1.40 mg/dL    Calcium 10.7 (H) 8.5 - 10.5 mg/dL    AST(SGOT) 26 12 - 45 U/L    ALT(SGPT) 23 2 - 50 U/L    Alkaline Phosphatase 121 (H) 30 - 99 U/L    Total Bilirubin 0.9 0.1 - 1.5 mg/dL    Albumin 5.7 (H) 3.2 - 4.9 g/dL    Total Protein 9.0 (H) 6.0 - 8.2 g/dL    Globulin 3.3 1.9 - 3.5 g/dL    A-G Ratio 1.7 g/dL   LIPASE   Result Value Ref Range    Lipase 21 11 - 82 U/L   ESTIMATED GFR   Result Value Ref Range    GFR If  33 (A) >60 mL/min/1.73 m 2    GFR If Non  27 (A) >60 mL/min/1.73 m 2   EKG (NOW)   Result Value Ref Range    Report       [facility] Emergency Dept.    Test Date:  2020-06-15  Pt Name:    JUVENAL PRICE                Department: ER  MRN:        [id number]                      Room:  Gender:     Female                       Technician: [id number]  :        [id number]                   Requested By:ER TRIAGE PROTOCOL  Order #:    [id number]                    Reading MD:    Measurements  Intervals                                Axis  Rate:       122                          P:          84  AZ:         160                          QRS:        90  QRSD:       86                           T:          0  QT:         312  QTc:        445    Interpretive Statements  SINUS TACHYCARDIA  CONSIDER RIGHT ATRIAL ABNORMALITY  BORDERLINE RIGHT AXIS DEVIATION  MINIMAL ST DEPRESSION, INFERIOR LEADS  Compared to ECG 2020 13:51:56  ST (T wave) deviation now present  Sinus rhythm no longer present  Ventricular premature complex(es) no longer present         All labs reviewed by me.    RADIOLOGY  CT-ABDOMEN-PELVIS W/O   Final Result      1.  Small  bowel obstruction, with transition in the left hemipelvis, presumably due to adhesions.   2.  Unchanged sclerotic lesions in the L5 vertebral body and in the bony pelvis, indeterminate. If there is history of malignancy, they may represent bony metastases that are stable since February 2020. Evaluation with bone scan can be considered.        The radiologist's interpretation of all radiological studies have been reviewed by me.    COURSE & MEDICAL DECISION MAKING  Pertinent Labs & Imaging studies reviewed. (See chart for details)    Obtained and reviewed past medical records.  I have reviewed records going back to January of this year when the patient was diagnosed with proctitis and colitis and underwent surgical correction of this.  She was again admitted to the hospital February 9 with nausea and vomiting.    12:57 PM - Patient seen and examined at bedside.  This is a pleasant 64-year-old female.  She presents with 3 days of nausea, vomiting decreased p.o. intake.  She is tachycardic with a heart rate nearly 120.  She has diffuse abdominal pain.  Clinically, she appears very dehydrated.  And IVs been established.  Labs drawn per nursing protocols.  She will be given IV fluids as well as pain medication and antiemetics.     The differential diagnoses include but are not limited to: Dehydration, electrolyte disturbance, bowel obstruction, colitis, diverticulitis, pancreatitis    1413PM reevaluated the bedside.  After administration of pain medication and antinausea medication, she is feeling better.  We discussed labs results which show marked abnormalities including a low chloride, elevated anion gap of 21, and elevated creatinine of 1.88, which is nearly double what it was on last check.  White blood cell count slightly elevated 11.7.  Given the fact that she continues to vomit and cannot take p.o.'s, I do not feel she can safely be discharged home and I recommended admission to the hospital despite not knowing  CT results as that may reveal additional pathology that may need attention perhaps.  Patient is agreeable.    2:39 PM patient is reevaluated at the bedside.  Her CT shows a bowel obstruction.  She will need an NG tube, she is described what this is and she is agreeable. Discussed with the hospitalist, AGUILAR Waters regarding admission, he agrees to accept the patient to their service.  He is aware, awaiting contact from Dr. Vázquez, General Surgeon.      1602PM D/W Dr. Vázquez agrees to see patient in consultation. She has left the hospital for today but can see patient tomorrow.  Currently, the patient does not appear to have any acute surgical needs.  She is feeling markedly better after IV fluids and medication administration at NG tube placement.    Patient's admitted in stable condition.    FINAL IMPRESSION  1. Dehydration    2. ELVIS (acute kidney injury) (HCC)    3. High anion gap metabolic acidosis    4. Non-intractable vomiting with nausea, unspecified vomiting type    5. Generalized abdominal pain    6. Small bowel obstruction (HCC)

## 2020-06-15 NOTE — TELEPHONE ENCOUNTER
"1. Caller Name: Carele                 Call Back Number: [phone]  [facility] PCP or Specialty Provider: Yes Dr. Colón        2.  In the last two weeks, has the patient had any new or worsening symptoms (not explained by alternative diagnosis)? Yes, the patient reports the following COVID-19 consistent symptoms: nausea and vomiting.    3.  Does patient have any comoribidities? None     4.  Has the patient traveled in the last 14 days OR had any known contact with someone who is suspected or confirmed to have COVID-19?  No.    5. Disposition: Advised to go to ED or call 9-1-1   Symptoms started Friday with cramping and abdominal pain with nausea and vomiting.  Patient is reporting two areas that are \"popping up\" .    Note routed to [facility] Provider: [facility] only.        "

## 2020-06-16 ENCOUNTER — APPOINTMENT (OUTPATIENT)
Dept: RADIOLOGY | Facility: MEDICAL CENTER | Age: 65
DRG: 389 | End: 2020-06-16
Attending: SURGERY
Payer: COMMERCIAL

## 2020-06-16 PROBLEM — R93.89 ABNORMAL RADIOLOGIC DENSITY: Status: ACTIVE | Noted: 2020-06-16

## 2020-06-16 PROBLEM — E87.6 HYPOKALEMIA: Status: ACTIVE | Noted: 2020-06-16

## 2020-06-16 PROBLEM — K56.609 SBO (SMALL BOWEL OBSTRUCTION) (HCC): Status: ACTIVE | Noted: 2020-06-16

## 2020-06-16 PROBLEM — N17.9 ACUTE RENAL FAILURE (ARF) (HCC): Status: ACTIVE | Noted: 2020-06-16

## 2020-06-16 LAB
ALBUMIN SERPL BCP-MCNC: 4.5 G/DL (ref 3.2–4.9)
ALBUMIN/GLOB SERPL: 1.6 G/DL
ALP SERPL-CCNC: 96 U/L (ref 30–99)
ALT SERPL-CCNC: 15 U/L (ref 2–50)
ANION GAP SERPL CALC-SCNC: 14 MMOL/L (ref 7–16)
AST SERPL-CCNC: 20 U/L (ref 12–45)
BASOPHILS # BLD AUTO: 0.4 % (ref 0–1.8)
BASOPHILS # BLD: 0.04 K/UL (ref 0–0.12)
BILIRUB SERPL-MCNC: 1.1 MG/DL (ref 0.1–1.5)
BUN SERPL-MCNC: 33 MG/DL (ref 8–22)
CALCIUM SERPL-MCNC: 9.7 MG/DL (ref 8.5–10.5)
CHLORIDE SERPL-SCNC: 94 MMOL/L (ref 96–112)
CHLORIDE UR-SCNC: <20 MMOL/L
CHOLEST SERPL-MCNC: 185 MG/DL (ref 100–199)
CO2 SERPL-SCNC: 26 MMOL/L (ref 20–33)
CREAT SERPL-MCNC: 0.86 MG/DL (ref 0.5–1.4)
CREAT UR-MCNC: 130.74 MG/DL
EOSINOPHIL # BLD AUTO: 0.18 K/UL (ref 0–0.51)
EOSINOPHIL NFR BLD: 1.9 % (ref 0–6.9)
ERYTHROCYTE [DISTWIDTH] IN BLOOD BY AUTOMATED COUNT: 42.1 FL (ref 35.9–50)
GLOBULIN SER CALC-MCNC: 2.9 G/DL (ref 1.9–3.5)
GLUCOSE SERPL-MCNC: 117 MG/DL (ref 65–99)
HCT VFR BLD AUTO: 40.9 % (ref 37–47)
HDLC SERPL-MCNC: 61 MG/DL
HGB BLD-MCNC: 14 G/DL (ref 12–16)
IMM GRANULOCYTES # BLD AUTO: 0.02 K/UL (ref 0–0.11)
IMM GRANULOCYTES NFR BLD AUTO: 0.2 % (ref 0–0.9)
LDLC SERPL CALC-MCNC: 106 MG/DL
LYMPHOCYTES # BLD AUTO: 1.41 K/UL (ref 1–4.8)
LYMPHOCYTES NFR BLD: 14.6 % (ref 22–41)
MCH RBC QN AUTO: 33.1 PG (ref 27–33)
MCHC RBC AUTO-ENTMCNC: 34.2 G/DL (ref 33.6–35)
MCV RBC AUTO: 96.7 FL (ref 81.4–97.8)
MONOCYTES # BLD AUTO: 0.78 K/UL (ref 0–0.85)
MONOCYTES NFR BLD AUTO: 8.1 % (ref 0–13.4)
NEUTROPHILS # BLD AUTO: 7.24 K/UL (ref 2–7.15)
NEUTROPHILS NFR BLD: 74.8 % (ref 44–72)
NRBC # BLD AUTO: 0 K/UL
NRBC BLD-RTO: 0 /100 WBC
PLATELET # BLD AUTO: 174 K/UL (ref 164–446)
PMV BLD AUTO: 11 FL (ref 9–12.9)
POTASSIUM SERPL-SCNC: 3.5 MMOL/L (ref 3.6–5.5)
POTASSIUM UR-SCNC: 65 MMOL/L
PROT SERPL-MCNC: 7.4 G/DL (ref 6–8.2)
PROT UR-MCNC: 30 MG/DL (ref 0–15)
RBC # BLD AUTO: 4.23 M/UL (ref 4.2–5.4)
SODIUM SERPL-SCNC: 134 MMOL/L (ref 135–145)
SODIUM UR-SCNC: 24 MMOL/L
TRIGL SERPL-MCNC: 91 MG/DL (ref 0–149)
WBC # BLD AUTO: 9.7 K/UL (ref 4.8–10.8)

## 2020-06-16 PROCEDURE — 80053 COMPREHEN METABOLIC PANEL: CPT

## 2020-06-16 PROCEDURE — 84300 ASSAY OF URINE SODIUM: CPT

## 2020-06-16 PROCEDURE — 700111 HCHG RX REV CODE 636 W/ 250 OVERRIDE (IP): Performed by: NURSE PRACTITIONER

## 2020-06-16 PROCEDURE — 84156 ASSAY OF PROTEIN URINE: CPT

## 2020-06-16 PROCEDURE — 700111 HCHG RX REV CODE 636 W/ 250 OVERRIDE (IP): Performed by: HOSPITALIST

## 2020-06-16 PROCEDURE — 82570 ASSAY OF URINE CREATININE: CPT

## 2020-06-16 PROCEDURE — 36415 COLL VENOUS BLD VENIPUNCTURE: CPT

## 2020-06-16 PROCEDURE — 74250 X-RAY XM SM INT 1CNTRST STD: CPT

## 2020-06-16 PROCEDURE — 770006 HCHG ROOM/CARE - MED/SURG/GYN SEMI*

## 2020-06-16 PROCEDURE — 700105 HCHG RX REV CODE 258: Performed by: HOSPITALIST

## 2020-06-16 PROCEDURE — 302131 K PAD MOTOR: Performed by: INTERNAL MEDICINE

## 2020-06-16 PROCEDURE — 99233 SBSQ HOSP IP/OBS HIGH 50: CPT | Performed by: INTERNAL MEDICINE

## 2020-06-16 PROCEDURE — 700117 HCHG RX CONTRAST REV CODE 255: Performed by: SURGERY

## 2020-06-16 PROCEDURE — 84133 ASSAY OF URINE POTASSIUM: CPT

## 2020-06-16 PROCEDURE — 85025 COMPLETE CBC W/AUTO DIFF WBC: CPT

## 2020-06-16 PROCEDURE — 302152 K-PAD 12X17: Performed by: INTERNAL MEDICINE

## 2020-06-16 PROCEDURE — 80061 LIPID PANEL: CPT

## 2020-06-16 PROCEDURE — 700105 HCHG RX REV CODE 258: Performed by: NURSE PRACTITIONER

## 2020-06-16 PROCEDURE — 82436 ASSAY OF URINE CHLORIDE: CPT

## 2020-06-16 RX ORDER — LORAZEPAM 2 MG/ML
0.5 INJECTION INTRAMUSCULAR 3 TIMES DAILY PRN
Status: DISCONTINUED | OUTPATIENT
Start: 2020-06-16 | End: 2020-06-17 | Stop reason: HOSPADM

## 2020-06-16 RX ADMIN — POTASSIUM CHLORIDE: 149 INJECTION, SOLUTION, CONCENTRATE INTRAVENOUS at 14:56

## 2020-06-16 RX ADMIN — PROCHLORPERAZINE EDISYLATE 10 MG: 5 INJECTION INTRAMUSCULAR; INTRAVENOUS at 04:19

## 2020-06-16 RX ADMIN — ONDANSETRON HYDROCHLORIDE 4 MG: 2 SOLUTION INTRAMUSCULAR; INTRAVENOUS at 14:01

## 2020-06-16 RX ADMIN — SODIUM CHLORIDE, POTASSIUM CHLORIDE, SODIUM LACTATE AND CALCIUM CHLORIDE: 600; 310; 30; 20 INJECTION, SOLUTION INTRAVENOUS at 04:28

## 2020-06-16 RX ADMIN — ONDANSETRON HYDROCHLORIDE 4 MG: 2 SOLUTION INTRAMUSCULAR; INTRAVENOUS at 18:29

## 2020-06-16 RX ADMIN — LORAZEPAM 0.5 MG: 2 INJECTION INTRAMUSCULAR; INTRAVENOUS at 11:08

## 2020-06-16 RX ADMIN — IOHEXOL 400 ML: 350 INJECTION, SOLUTION INTRAVENOUS at 12:00

## 2020-06-16 RX ADMIN — ONDANSETRON HYDROCHLORIDE 4 MG: 2 SOLUTION INTRAMUSCULAR; INTRAVENOUS at 22:27

## 2020-06-16 RX ADMIN — LORAZEPAM 0.5 MG: 2 INJECTION INTRAMUSCULAR; INTRAVENOUS at 18:29

## 2020-06-16 ASSESSMENT — ENCOUNTER SYMPTOMS
WEIGHT LOSS: 0
PND: 0
VOMITING: 0
BLURRED VISION: 0
SEIZURES: 0
COUGH: 0
DIARRHEA: 0
NAUSEA: 0
SENSORY CHANGE: 0
FOCAL WEAKNESS: 0
HEADACHES: 0
CHILLS: 0
PALPITATIONS: 0
HEARTBURN: 0
HEMOPTYSIS: 0
ABDOMINAL PAIN: 1
DOUBLE VISION: 0
DIAPHORESIS: 0
MYALGIAS: 0
STRIDOR: 0
FEVER: 0
PHOTOPHOBIA: 0
WHEEZING: 0
SORE THROAT: 0
ORTHOPNEA: 0
BRUISES/BLEEDS EASILY: 0
DIZZINESS: 0
BLOOD IN STOOL: 0
SHORTNESS OF BREATH: 0

## 2020-06-16 NOTE — ASSESSMENT & PLAN NOTE
"Abnl finding on CT \"Unchanged sclerotic lesions in the L5 vertebral body and in the bony pelvis\"  Globulin normal  Consider OP f/u imaging bone scan vs MRI  "

## 2020-06-16 NOTE — CONSULTS
6/15  CC: Abdominal Pain, SBO    HPI: 64y F who presents with abdominal pain, cramping, inability to pass flatus or stools for the last 3 days.  She feels like her abdomen is getting more distended as well.  No recurrent prolapse noted.  NGt was placed by the ED    PMHx: Anemia, Urinary Disorder, Rectal Prolapse    PSHx: LAR/Rectopexy 1/21/20    Meds: see Med Rec, no anticoagulation    All: Sulfa    FamHx: no colon/rectal cancers, no other pertinent family history    SocHx: No Tob/Drugs/EtOH      ROS: negative except as above    Consitutional- above  HEENT- no visual changes, no sneezing or runny nose  Skin- no rashes or itching  Cardiovascular- no chest pain or palpatations  Respiratory- no SOB or cough  GI- above  - no dysurea  Neuro- no weakness or syncope  Musculoskeletal- no muscle or joint pain  Heme- no bleeding or bruising  Lymphatic- no enlarged nodes or previous splenectomy  Endocrine- No sweating or heat/cold intolerance  Allergy- No asthma or hives  Psychiatric- no depression or anxiety        Physical Exam:   AFVSS  A@O x3, NAD  NCAT, no scleral icterus  Neck nontender, no lymphadenopathy  Normal respiratory effort, no chest wall masses  RRR, 2+ pulses  Abdomen soft, no peritonitis, no masses  Extremities warm and well perfused  No skin rashes or lesions    Labs: WBC 11.7, Hct 49, Plt 247  Lytes wnl, Cr elevated at 1.8    Radiology: CT A/P:   1.  Small bowel obstruction, with transition in the left hemipelvis, presumably due to adhesions.   2.  Unchanged sclerotic lesions in the L5 vertebral body and in the bony pelvis, indeterminate. If there is history of malignancy, they may represent bony metastases that are stable since February 2020. Evaluation with bone scan can be considered.         A/P: 64y F with small bowel obstruction, likely secondary to adhesive disease.  Recommend NPO, NGT to suction for now.  Encouraged ambulation as able and recommend IVF for hydration.  Will see if her obstruction  resolves with conservative care.

## 2020-06-16 NOTE — PROGRESS NOTES
2 RN skin check completed with GILES Carpio.   Devices in place: NG tube, PIV.  Skin assessed under devices: yes.  Confirmed pressure ulcers found: none.  New potential pressure ulcers noted: none. Wound consult placed:  n/a.    Skin assessment: heels pink/blanching. Elbows pink/blanching. Sacrum pink/blanching. Old abdominal scar on left abdomen.    The following interventions in place: encouraged fluids and frequent repositioning in bed.

## 2020-06-16 NOTE — ASSESSMENT & PLAN NOTE
Patient does have history of multiple colonoscopies without malignancy  Keep n.p.o.  NG tube in place  IV hydration  Monitor potassium  Serial abdominal examinations  Antinausea medication  Surgery is consulted-no surgical interventions planned  SBFT 6/16

## 2020-06-16 NOTE — CARE PLAN
Problem: Safety  Goal: Will remain free from injury  Outcome: PROGRESSING AS EXPECTED  Note: Fall precautions in place. Bed in lowest position. Non-skid socks in place. Personal possessions within reach. Mobility sign on door. Bed-alarm off as patient is up ad ezio. Call light within reach. Pt educated regarding fall prevention and states understanding.       Problem: Bowel/Gastric:  Goal: Normal bowel function is maintained or improved  Outcome: PROGRESSING SLOWER THAN EXPECTED  Note: Patient has bowel instruction and has NG tube hooked up to suction. Patient is NPO.
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
## 2020-06-16 NOTE — DIETARY
Nutrition Services: MST score = 1 and BMI < 3rd %ile     Pt is a 65 yo F admitted for bowel obstruction and on day 1 of admit - currently NPO.    Ht: 165.1 cm  Wt: 49.5 kg   BMI: 18.16 (underweight)    Weight loss in past month reported on admission. Malnutrition Screening Tool score <2. Per chart review, during admit in January pt weighed 49.9 kg (110#) and also reported to RD that her UBW was 105-110# which has been maintained x 4 years. Low BMI appears to be baseline for pt and pt's weight has been stable since admit in January - full nutrition assessment not indicated at this time.       RD available PRN and will monitor for diet advancement in a timely manner per dept policy.

## 2020-06-16 NOTE — PROGRESS NOTES
6/16  Pt seen and examined, here with SBO which has not improved overnight.  NGT remains in place, gastric contents out, emesis x1 overnight despite tube.  No flatus or BM noted.  Still with cramping abdominal pain as well.    Exam remains benign  WBC normal  K being replaced IV    Recommend small bowel follow through at this point, will determine if a transition point is still present and decide on possible surgery base on that.  Continue NPO, NGT to suction, IVF.  D/w patient and primary team.

## 2020-06-16 NOTE — PROGRESS NOTES
Received report from night shift RN and assumed care of patient. Patient is A&O x 4. Patient reports abdominal pain of 4/10 at this time. Patient does not have medication at this time as she is NPO. Patient went to radiology for small bowel series. RN was called because NG tube was in esophagus and not the stomach. RN went and advanced NG tube. Air was heard in stomach and radiology obtained confirmation of placement. Patient back on unit. Radiology to come do follow up x-ray at bedside and requests that patient be up moving to get contrast moving through bowel. Patient is ambulating in hallway. Ordered K pad for patient due to pain in abdomen which is relieved by heat.   No other signs of distress at this time. Bed is in lowest, locked position, call light and belongings are within reach. Patient calls for assistance and bed alarm is off as patient ambulates.  All other needs met.

## 2020-06-16 NOTE — ASSESSMENT & PLAN NOTE
--- RESOLVED  Likely prerenal  Monitor BMP and assess response  Avoid IV contrast/nephrotoxins/NSAIDs  Dose adjust meds for decreased GFR

## 2020-06-16 NOTE — CARE PLAN
Problem: Safety  Goal: Will remain free from falls  Note: Safety precautions in place. Bed in low position, treaded socks on, personal possessions in reach, call light in reach and pt using it to call for assistance.     Problem: Bowel/Gastric:  Goal: Normal bowel function is maintained or improved  Note: NG tube in place with continuous sucttion. Tolering well.

## 2020-06-16 NOTE — PROGRESS NOTES
Pt AOx4, heat packs given for abdominal pain/cramps. Pt ambulated hallway with steady gait. NG tube patent with continuous suction. Pt sitting up in bed, needs met. Call light within reach, personal belongings available, bed in lowest position, treaded socks on, and hourly rounding in place.

## 2020-06-16 NOTE — PROGRESS NOTES
Patient admitted to unit. Patient complains of pain in her back and ribs due to recent vomiting but declines medication at this time. Admission profile completed. 2 RN skin check done with NOC RN. Patient arrived with 16 Vietnamese NG tube. Placement confirmed by x-ray on floor. Patient is currently strict NPO. Safety precautions in place and all needs met at this time.

## 2020-06-16 NOTE — CARE PLAN
Problem: Safety  Goal: Will remain free from injury  Outcome: PROGRESSING AS EXPECTED  Note: Fall precautions in place. Bed in lowest position. Non-skid socks in place. Personal possessions within reach. Mobility sign on door. Bed-alarm off as patient ambulates. Call light within reach. Pt educated regarding fall prevention and states understanding.      Problem: Bowel/Gastric:  Goal: Normal bowel function is maintained or improved  Outcome: PROGRESSING AS EXPECTED  Flowsheets (Taken 6/16/2020 1406)  Last BM: 06/16/20  Number of Times Stooled: 1  Note: Patient went for small bowel series in radiology. Patient is still nauseous but did have a small bowel movement upon return.

## 2020-06-16 NOTE — PROGRESS NOTES
[facility] Medicine Daily Progress Note    Date of Service  6/16/2020    Chief Complaint  64 y.o. female admitted 6/15/2020 with SBO    Hospital Course    Patient is a 64-year-old female with history of complete full-thickness rectal prolapse status post proctopexy with low anterior resection 1/21/2020 with subsequent hospitalization in February for abdominal pain.  She presented to the emergency room with abdominal pain, nausea and vomiting for 3 days.  CT scan revealed dilated small bowel suggestive of SBO.  Laboratory studies revealed acute kidney injury with creatinine 1.8.  Her general surgeon was contacted and she was started on IV fluids and kept n.p.o.  She was admitted to the medical floor.      Interval Problem Update  Patient evaluated by general surgery who would like to see if she improves without surgical intervention.  Surgery feels she likely has postoperative adhesions that could be causing obstruction. Planning SBFT today.  Pt c/o IC pain. Takes Alpraz TID  ELVIS has markedly improved with IV hydration and her creatinine is now normal  Leukocytosis resolved  Hypokalemia 3.5  Hyponatremia 134  On admission urinalysis showed proteinuria with epithelial and hyaline casts  FENa - 0.1% suggestive of pre-renal source    Consultants/Specialty  Sx    Code Status  Full    Disposition  TBd    Review of Systems  Review of Systems   Constitutional: Negative for chills, diaphoresis, fever and weight loss.   HENT: Negative for ear pain, sore throat and tinnitus.    Eyes: Negative for blurred vision, double vision and photophobia.   Respiratory: Negative for cough, hemoptysis, shortness of breath, wheezing and stridor.    Cardiovascular: Negative for chest pain, palpitations, orthopnea and PND.   Gastrointestinal: Positive for abdominal pain. Negative for blood in stool, diarrhea, heartburn, melena, nausea and vomiting.   Genitourinary: Negative for dysuria, hematuria and urgency.   Musculoskeletal: Negative for  joint pain and myalgias.   Neurological: Negative for dizziness, sensory change, focal weakness, seizures and headaches.   Endo/Heme/Allergies: Does not bruise/bleed easily.   All other systems reviewed and are negative.       Physical Exam  Temp:  [36.8 °C (98.3 °F)-37.4 °C (99.3 °F)] 36.8 °C (98.3 °F)  Pulse:  [] 98  Resp:  [17-20] 17  BP: (115-164)/(68-93) 115/73  SpO2:  [93 %-97 %] 96 %    Physical Exam  Vitals signs and nursing note reviewed.   Constitutional:       General: She is not in acute distress.  HENT:      Head: Normocephalic and atraumatic.      Nose: Nose normal.   Eyes:      Pupils: Pupils are equal, round, and reactive to light.   Neck:      Musculoskeletal: Neck supple.   Cardiovascular:      Rate and Rhythm: Normal rate and regular rhythm.      Heart sounds: Normal heart sounds.   Pulmonary:      Effort: Pulmonary effort is normal.      Breath sounds: Normal breath sounds.   Abdominal:      General: Bowel sounds are normal. There is no distension.      Palpations: Abdomen is soft.      Tenderness: There is abdominal tenderness in the right lower quadrant, suprapubic area and left lower quadrant.   Neurological:      Mental Status: She is alert and oriented to person, place, and time.   Psychiatric:         Mood and Affect: Mood normal.         Fluids    Intake/Output Summary (Last 24 hours) at 6/16/2020 0752  Last data filed at 6/16/2020 0500  Gross per 24 hour   Intake 1000 ml   Output 300 ml   Net 700 ml       Laboratory  Recent Labs     06/15/20  1236 06/16/20  0445   WBC 11.7* 9.7   RBC 4.97 4.23   HEMOGLOBIN 16.7* 14.0   HEMATOCRIT 48.6* 40.9   MCV 97.8 96.7   MCH 33.6* 33.1*   MCHC 34.4 34.2   RDW 43.5 42.1   PLATELETCT 247 174   MPV 10.9 11.0     Recent Labs     06/15/20  1236 06/16/20  0445   SODIUM 136 134*   POTASSIUM 4.0 3.5*   CHLORIDE 90* 94*   CO2 25 26   GLUCOSE 123* 117*   BUN 46* 33*   CREATININE 1.88* 0.86   CALCIUM 10.7* 9.7             Recent Labs     06/16/20  0445  "  TRIGLYCERIDE 91   HDL 61   *       Imaging  DX-ABDOMEN FOR TUBE PLACEMENT   Final Result         Gastric drainage tube with tip projecting over the expected area of the stomach.      CT-ABDOMEN-PELVIS W/O   Final Result      1.  Small bowel obstruction, with transition in the left hemipelvis, presumably due to adhesions.   2.  Unchanged sclerotic lesions in the L5 vertebral body and in the bony pelvis, indeterminate. If there is history of malignancy, they may represent bony metastases that are stable since February 2020. Evaluation with bone scan can be considered.           Assessment/Plan  * SBO (small bowel obstruction) (HCC)- (present on admission)  Assessment & Plan  Patient does have history of multiple colonoscopies without malignancy  Keep n.p.o.  NG tube in place  IV hydration  Monitor potassium  Serial abdominal examinations  Antinausea medication  Surgery is consulted-no surgical interventions planned  SBFT 6/16      Hypokalemia  Assessment & Plan  Change maintenance IV fluids from LR to LR with 20 mEq of potassium  Daily BMP  IV replacement to serum goal 4.0    Interstitial cystitis- (present on admission)  Assessment & Plan  Takes Alprazolam TID  Switch to IV lorazepam injection until taking PO    Abnormal radiologic density  Assessment & Plan  Abnl finding on CT \"Unchanged sclerotic lesions in the L5 vertebral body and in the bony pelvis\"  Globulin normal  Consider OP f/u imaging bone scan vs MRI    Acute renal failure (ARF) (HCC)  Assessment & Plan  --- RESOLVED  Likely prerenal  Monitor BMP and assess response  Avoid IV contrast/nephrotoxins/NSAIDs  Dose adjust meds for decreased GFR        Osteoporosis- (present on admission)  Assessment & Plan  Patient had a DEXA scan earlier this month that found osteoporosis L5 region  Patient is on Fosamax         VTE prophylaxis: Ambulatory      "

## 2020-06-16 NOTE — ASSESSMENT & PLAN NOTE
Change maintenance IV fluids from LR to LR with 20 mEq of potassium  Daily BMP  IV replacement to serum goal 4.0

## 2020-06-16 NOTE — ASSESSMENT & PLAN NOTE
Patient had a DEXA scan earlier this month that found osteoporosis L5 region  Patient is on Fosamax

## 2020-06-17 VITALS
BODY MASS INDEX: 18.18 KG/M2 | HEIGHT: 65 IN | TEMPERATURE: 99.1 F | OXYGEN SATURATION: 93 % | WEIGHT: 109.13 LBS | RESPIRATION RATE: 17 BRPM | DIASTOLIC BLOOD PRESSURE: 78 MMHG | HEART RATE: 84 BPM | SYSTOLIC BLOOD PRESSURE: 135 MMHG

## 2020-06-17 PROBLEM — N17.9 ACUTE RENAL FAILURE (ARF) (HCC): Status: RESOLVED | Noted: 2020-06-16 | Resolved: 2020-06-17

## 2020-06-17 PROBLEM — K56.609 SBO (SMALL BOWEL OBSTRUCTION) (HCC): Status: RESOLVED | Noted: 2020-06-16 | Resolved: 2020-06-17

## 2020-06-17 LAB
ANION GAP SERPL CALC-SCNC: 12 MMOL/L (ref 7–16)
BUN SERPL-MCNC: 22 MG/DL (ref 8–22)
CALCIUM SERPL-MCNC: 9.7 MG/DL (ref 8.5–10.5)
CHLORIDE SERPL-SCNC: 95 MMOL/L (ref 96–112)
CO2 SERPL-SCNC: 31 MMOL/L (ref 20–33)
CREAT SERPL-MCNC: 0.85 MG/DL (ref 0.5–1.4)
GLUCOSE SERPL-MCNC: 127 MG/DL (ref 65–99)
MAGNESIUM SERPL-MCNC: 1.9 MG/DL (ref 1.5–2.5)
POTASSIUM SERPL-SCNC: 3.3 MMOL/L (ref 3.6–5.5)
SODIUM SERPL-SCNC: 138 MMOL/L (ref 135–145)

## 2020-06-17 PROCEDURE — 99239 HOSP IP/OBS DSCHRG MGMT >30: CPT | Performed by: INTERNAL MEDICINE

## 2020-06-17 PROCEDURE — 700105 HCHG RX REV CODE 258: Performed by: NURSE PRACTITIONER

## 2020-06-17 PROCEDURE — 36415 COLL VENOUS BLD VENIPUNCTURE: CPT

## 2020-06-17 PROCEDURE — 700102 HCHG RX REV CODE 250 W/ 637 OVERRIDE(OP): Performed by: NURSE PRACTITIONER

## 2020-06-17 PROCEDURE — A9270 NON-COVERED ITEM OR SERVICE: HCPCS | Performed by: NURSE PRACTITIONER

## 2020-06-17 PROCEDURE — 83735 ASSAY OF MAGNESIUM: CPT

## 2020-06-17 PROCEDURE — 80048 BASIC METABOLIC PNL TOTAL CA: CPT

## 2020-06-17 PROCEDURE — 700111 HCHG RX REV CODE 636 W/ 250 OVERRIDE (IP): Performed by: NURSE PRACTITIONER

## 2020-06-17 RX ORDER — POTASSIUM CHLORIDE 20 MEQ/1
40 TABLET, EXTENDED RELEASE ORAL ONCE
Status: COMPLETED | OUTPATIENT
Start: 2020-06-17 | End: 2020-06-17

## 2020-06-17 RX ADMIN — POTASSIUM CHLORIDE: 149 INJECTION, SOLUTION, CONCENTRATE INTRAVENOUS at 05:50

## 2020-06-17 RX ADMIN — LORAZEPAM 0.5 MG: 2 INJECTION INTRAMUSCULAR; INTRAVENOUS at 05:48

## 2020-06-17 RX ADMIN — POTASSIUM CHLORIDE 40 MEQ: 1500 TABLET, EXTENDED RELEASE ORAL at 12:50

## 2020-06-17 NOTE — DISCHARGE INSTRUCTIONS
Discharge Instructions    Discharged to home by car with relative. Discharged via walking, hospital escort: Yes.  Special equipment needed: Not Applicable    Be sure to schedule a follow-up appointment with your primary care doctor or any specialists as instructed.     Discharge Plan:   Diet Plan: Discussed  Activity Level: Discussed  Confirmed Follow up Appointment: Patient to Call and Schedule Appointment(As needed)  Confirmed Symptoms Management: Discussed  Medication Reconciliation Updated: Yes    I understand that a diet low in cholesterol, fat, and sodium is recommended for good health. Unless I have been given specific instructions below for another diet, I accept this instruction as my diet prescription.   Other diet: Regular      Special Instructions: None    · Is patient discharged on Warfarin / Coumadin?   No         Intestinal Obstruction  Care After  Please read the instructions outlined below. Refer to these instructions for the next few weeks. These discharge instructions provide you with general information on caring for yourself after surgery. Your caregiver may also give you specific instructions. While your treatment has been planned according to the most current medical practices available, unavoidable complications occasionally occur. If you have any problems or questions after discharge, please call your caregiver.  HOME CARE INSTRUCTIONS   · It is normal to be sore for a couple weeks following surgery. See your caregiver if this seems to be getting worse rather than better.  · Take prescribed medicine as directed. Only take over-the-counter or prescription medicines for pain, discomfort, and or fever, as directed by your caregiver.  · Use showers for bathing, until seen or as instructed.  · Change dressings if necessary or as directed.  · You may resume normal diet and activities as directed or allowed.  · Liquids and soft foods may be easier to digest at first. Once you can tolerate liquid  and soft foods, you can begin eating regular solid foods.  · Avoid lifting or driving until you are instructed otherwise.  · Make an appointment to see your caregiver for suture (stitches) or staple removal when instructed.  · You may use ice on your incision for 15-20 minutes, 3-4 times per day for the first two days.  SEEK MEDICAL CARE IF:   · You see redness, swelling, or have increasing pain in wounds.  · You have pus coming from your wound.  · You develop an unexplained temperature over 102° F (38.9° C).  · You have a bad smell coming from the wound or dressing.  · You develop lightheadedness or feel faint.  SEEK IMMEDIATE MEDICAL CARE IF:   · You have increased bleeding from wounds.  · You have increasing abdominal pain, repeated vomiting, dehydration or fainting.  · You develop severe weakness, chest pain or back pain  · You develop blood in your vomit, stool or you have tarry stool.  · You develop a rash.  · You have difficulty breathing.  · You develop any reaction or side effects to medicines given.  Document Released: 07/07/2006 Document Revised: 03/11/2013 Document Reviewed: 01/20/2009  [employer]® Patient Information ©2014 [employer].      High-Fiber Diet  Fiber, also called dietary fiber, is a type of carbohydrate found in fruits, vegetables, whole grains, and beans. A high-fiber diet can have many health benefits. Your health care provider may recommend a high-fiber diet to help:  · Prevent constipation. Fiber can make your bowel movements more regular.  · Lower your cholesterol.  · Relieve hemorrhoids, uncomplicated diverticulosis, or irritable bowel syndrome.  · Prevent overeating as part of a weight-loss plan.  · Prevent heart disease, type 2 diabetes, and certain cancers.  What is my plan?  The recommended daily intake of fiber includes:  · 38 grams for men under age 50.  · 30 grams for men over age 50.  · 25 grams for women under age 50.  · 21 grams for women over age 50.  You can get the  recommended daily intake of dietary fiber by eating a variety of fruits, vegetables, grains, and beans. Your health care provider may also recommend a fiber supplement if it is not possible to get enough fiber through your diet.  What do I need to know about a high-fiber diet?  · Fiber supplements have not been widely studied for their effectiveness, so it is better to get fiber through food sources.  · Always check the fiber content on the nutrition facts label of any prepackaged food. Look for foods that contain at least 5 grams of fiber per serving.  · Ask your dietitian if you have questions about specific foods that are related to your condition, especially if those foods are not listed in the following section.  · Increase your daily fiber consumption gradually. Increasing your intake of dietary fiber too quickly may cause bloating, cramping, or gas.  · Drink plenty of water. Water helps you to digest fiber.  What foods can I eat?  Grains   Whole-grain breads. Multigrain cereal. Oats and oatmeal. Brown rice. Barley. Bulgur wheat. Millet. Bran muffins. Popcorn. Rye wafer crackers.  Vegetables   Sweet potatoes. Spinach. Kale. Artichokes. Cabbage. Broccoli. Green peas. Carrots. Squash.  Fruits   Berries. Pears. Apples. Oranges. Avocados. Prunes and raisins. Dried figs.  Meats and Other Protein Sources   Navy, kidney, katz, and soy beans. Split peas. Lentils. Nuts and seeds.  Dairy   Fiber-fortified yogurt.  Beverages   Fiber-fortified soy milk. Fiber-fortified orange juice.  Other   Fiber bars.  The items listed above may not be a complete list of recommended foods or beverages. Contact your dietitian for more options.   What foods are not recommended?  Grains   White bread. Pasta made with refined flour. White rice.  Vegetables   Fried potatoes. Canned vegetables. Well-cooked vegetables.  Fruits   Fruit juice. Cooked, strained fruit.  Meats and Other Protein Sources   Fatty cuts of meat. Fried poultry or fried  fish.  Dairy   Milk. Yogurt. Cream cheese. Sour cream.  Beverages   Soft drinks.  Other   Cakes and pastries. Butter and oils.  The items listed above may not be a complete list of foods and beverages to avoid. Contact your dietitian for more information.   What are some tips for including high-fiber foods in my diet?  · Eat a wide variety of high-fiber foods.  · Make sure that half of all grains consumed each day are whole grains.  · Replace breads and cereals made from refined flour or white flour with whole-grain breads and cereals.  · Replace white rice with brown rice, bulgur wheat, or millet.  · Start the day with a breakfast that is high in fiber, such as a cereal that contains at least 5 grams of fiber per serving.  · Use beans in place of meat in soups, salads, or pasta.  · Eat high-fiber snacks, such as berries, raw vegetables, nuts, or popcorn.  This information is not intended to replace advice given to you by your health care provider. Make sure you discuss any questions you have with your health care provider.  Document Released: 12/18/2006 Document Revised: 05/25/2017 Document Reviewed: 06/02/2015  [employer] Interactive Patient Education © 2017 [employer]        Depression / Suicide Risk    As you are discharged from this [facility] facility, it is important to learn how to keep safe from harming yourself.    Recognize the warning signs:  · Abrupt changes in personality, positive or negative- including increase in energy   · Giving away possessions  · Change in eating patterns- significant weight changes-  positive or negative  · Change in sleeping patterns- unable to sleep or sleeping all the time   · Unwillingness or inability to communicate  · Depression  · Unusual sadness, discouragement and loneliness  · Talk of wanting to die  · Neglect of personal appearance   · Rebelliousness- reckless behavior  · Withdrawal from people/activities they love  · Confusion- inability to concentrate     If  you or a loved one observes any of these behaviors or has concerns about self-harm, here's what you can do:  · Talk about it- your feelings and reasons for harming yourself  · Remove any means that you might use to hurt yourself (examples: pills, rope, extension cords, firearm)  · Get professional help from the community (Mental Health, Substance Abuse, psychological counseling)  · Do not be alone:Call your Safe Contact- someone whom you trust who will be there for you.  · Call your local CRISIS HOTLINE 443-8702 or 016-247-3735  · Call your local Children's Mobile Crisis Response Team [place] (788) 603-3792 or www.Enohm  · Call the toll free National Suicide Prevention Hotlines   · National Suicide Prevention Lifeline 252-909-NHTW (8000)  · [place] Hope Line Network 800-SUICIDE (702-2515)      Discharge Instructions per Luke Harrell, A.P.N.    1.  Review your discharge Medication Reconciliation form as you may be provided with new prescriptions or changes to previously prescribed medications.  Be sure to take all of your prescribed medications exactly as directed.  Further questions can be directed to your local pharmacist or primary care provider (PCP).    2. Follow-up with your PCP.  An appointment may have already been scheduled for you.  Please review your discharge paperwork and locate this information.  Please be sure to call your PCP to cancel if you are unable to make this appointment or require schedule changes.  It is critical to to follow-up with your PCP to review hospital records and ensure any further diagnostic work-up, prescription refills, or referrals.     3. ACTIVITIES: After discharge from the hospital, you may resume routine activities including walking and going u/down stairs. However, no strenuous activity. For further clarification, call your PCP     4. DRIVING: You may drive whenever you are off pain medications and are able to perform the activities needed to drive, i.e.  turning, bending, twisting, etc.     5. BOWEL FUNCTION: A few patients, after hospitalizations, will develop bowel irregularity. You could also experience constipation due to pain medication and decreased activity level. If you feel this is occurring, please take an over-the-counter laxative (Milk of Magnesia, Ex-Lax, Senokot, etc.) until the problem has resolved.     DIET: Soft food, high liquid    ACTIVITY: As tolerated    Return to ER if you develop recurrent chest pain, shortness of breath, bloody sputum, fever of 101.5 or greater, intractable nausea or vomiting, blood in the vomit or urine, intractable pain, new onset inability to ambulate, focal motor weakness, acute vision disturbance, acute speech disturbance, intractable abdominal pain, or any other worrisome symptoms.

## 2020-06-17 NOTE — PROGRESS NOTES
Received report from night shift RN and assumed care of patient. Patient is A&O x 4. Patient reports no pain at this time. Patient ambulated in hallway this morning.  Patient's NG tube removed at 1000 per MD order. Patient started on full liquid diet with crackers. Tolerated well. No other signs of distress at this time. Bed is in lowest, locked position, call light and belongings are within reach. Patient calls for assistance and bed alarm is off as patient ambulates.  All other needs met.

## 2020-06-17 NOTE — PROGRESS NOTES
6/17  Pt seen and examined, completed SBFT yesterday with prompt passage of contrast to the large bowel and rectum.  She did have some emesis after this and was noted to have dilated proximal small bowel.  She is feeling ok overnight and NGT has been having minimal output.      Abdomen soft, nontender, nondistended and no peritonitis    Ok to remove NGT this morning, no obstruction present.  Ok to start full liquid diet with limited solids.  If she does well with this she can likely go home later today or tomorrow.  I discussed possibility that she may have some lingering symptoms if a partial obstruction is still present, but time will tell.  Avoiding surgery for now, pt agrees with treatment plan.    Please call with any questions, home per primary team

## 2020-06-17 NOTE — CARE PLAN
Problem: Safety  Goal: Will remain free from injury  Outcome: PROGRESSING AS EXPECTED  Note: Fall precautions in place. Bed in lowest position. Non-skid socks in place. Personal possessions within reach. Mobility sign on door. Bed-alarm off as patient ambulatory. Call light within reach. Pt educated regarding fall prevention and states understanding.       Problem: Bowel/Gastric:  Goal: Normal bowel function is maintained or improved  Outcome: PROGRESSING AS EXPECTED  Note: Patients symptoms have improved, NG tube has been removed, and patient tolerating solid food.

## 2020-06-17 NOTE — DISCHARGE SUMMARY
Discharge Summary    CHIEF COMPLAINT ON ADMISSION  Chief Complaint   Patient presents with   • Abdominal Pain     low abdomen x3 days   • N/V     x3 days   • Sent by MD     surgical procedure in January 2020, recent hospitalization patient reports abx administration and unknown diagnosis.       Reason for Admission  Sent by MD     Admission Date  6/15/2020    CODE STATUS  Full Code    HPI & HOSPITAL COURSE  Patient is a 64-year-old female with history of complete full-thickness rectal prolapse status post proctopexy with low anterior resection 1/21/2020 with subsequent hospitalization in February for abdominal pain.  She presented to the emergency room with abdominal pain, nausea and vomiting for 3 days.  CT scan revealed dilated small bowel suggestive of SBO.  Laboratory studies revealed acute kidney injury with creatinine 1.8.  NG tube was placed for decompression, her general surgeon was contacted, and she was started on IV fluids and kept n.p.o.     Her acute kidney injury resolved the following morning with IV fluid.  She was given replacement benzodiazepine for her history of interstitial cystitis with reduction in her pain.  General surgery ordered a small bowel follow-through which revealed partial small bowel obstruction as well as contrast media in the colon.  Patient was started on a full liquid diet and tolerated this without any exacerbation of her pain, nausea or vomiting.  Laboratory results reveal hypokalemia and she was given replacement.  In the future she will need to adhere to a soft food high fluid diet to assist with transit.  She will need to follow-up with her general surgeon on a as needed basis and with her primary care as well.    Therefore, she is discharged in good and stable condition to home with close outpatient follow-up.    The patient met 2-midnight criteria for an inpatient stay at the time of discharge.    Discharge Date  6/17/2020    FOLLOW UP ITEMS POST DISCHARGE  PCP follow-up  as needed for hypokalemia follow-up    DISCHARGE DIAGNOSES  Principal Problem (Resolved):    SBO (small bowel obstruction) (HCC) POA: Yes  Active Problems:    Hypokalemia POA: Yes    Interstitial cystitis (Chronic) POA: Yes      Overview: Chronic stable condition    Osteoporosis (Chronic) POA: Yes      Overview: Confirmed by dexa 6.1.20: started fosamax    Abnormal radiologic density POA: Yes  Resolved Problems:    Acute renal failure (ARF) (HCC) POA: Yes      FOLLOW UP  Erlin Colón M.D.  [address]  [facility] [address]  [phone]    Schedule an appointment as soon as possible for a visit  As needed      MEDICATIONS ON DISCHARGE     Medication List      Continue taking these medications      Instructions   SLEEP AID PO   Take 2 Tabs by mouth every bedtime.  Dose:  2 Tab     sumatriptan 100 MG tablet  Commonly known as:  IMITREX   Take 100 mg by mouth 1 time daily as needed.  Dose:  100 mg     tizanidine 4 MG Tabs  Commonly known as:  ZANAFLEX   Take 4 mg by mouth every 6 hours as needed.  Dose:  4 mg     Xanax 0.5 MG Tabs  Generic drug:  ALPRAZolam   Take 0.5 mg by mouth 3 times a day as needed for Anxiety.  Dose:  0.5 mg            Allergies  Allergies   Allergen Reactions   • Sulfa Drugs      hives       DIET  Orders Placed This Encounter   Procedures   • Diet Order Full Liquid (toast and crackers also ok)     Standing Status:   Standing     Number of Occurrences:   1     Order Specific Question:   Diet:     Answer:   Full Liquid [11]     Comments:   toast and crackers also ok       ACTIVITY  As tolerated.  Weight bearing as tolerated    CONSULTATIONS  General surgery    LABORATORY  Lab Results   Component Value Date    SODIUM 138 06/17/2020    POTASSIUM 3.3 (L) 06/17/2020    CHLORIDE 95 (L) 06/17/2020    CO2 31 06/17/2020    GLUCOSE 127 (H) 06/17/2020    BUN 22 06/17/2020    CREATININE 0.85 06/17/2020        Lab Results   Component Value Date    WBC 9.7 06/16/2020    HEMOGLOBIN 14.0 06/16/2020     HEMATOCRIT 40.9 06/16/2020    PLATELETCT 174 06/16/2020        Total time of the discharge process exceeds 39 minutes.

## 2020-06-17 NOTE — PROGRESS NOTES
Patient is A&Ox4. Patient reports abdominal pain of 3/10 which is relieved with heat packs. K pad is in place. Patient also reports nausea which is relieved with IV Zofran. Patient is NPO at this time and has an NG tube set to low intermittent suction. Patient is up self and tolerates ambulation well. Gait is steady and balanced. Patient is resting calmly and not displaying signs of distress. Bed is in lowest position, locked, and call-light and belongings are within reach. Patient calls for assistance and bed alarm is off. All other needs met.

## 2020-06-17 NOTE — CARE PLAN
Problem: Communication  Goal: The ability to communicate needs accurately and effectively will improve  6/16/2020 2321 by Christine Silva R.N.  Outcome: PROGRESSING AS EXPECTED  6/16/2020 2318 by Christine Silva R.N.  Outcome: PROGRESSING AS EXPECTED     Problem: Safety  Goal: Will remain free from injury  6/16/2020 2321 by Christine Silva R.N.  Outcome: PROGRESSING AS EXPECTED  6/16/2020 2318 by Christine Silva R.N.  Outcome: PROGRESSING AS EXPECTED  Goal: Will remain free from falls  Outcome: PROGRESSING AS EXPECTED

## 2020-06-24 ENCOUNTER — OFFICE VISIT (OUTPATIENT)
Dept: MEDICAL GROUP | Facility: PHYSICIAN GROUP | Age: 65
End: 2020-06-24
Payer: COMMERCIAL

## 2020-06-24 ENCOUNTER — HOSPITAL ENCOUNTER (OUTPATIENT)
Dept: RADIOLOGY | Facility: MEDICAL CENTER | Age: 65
End: 2020-06-24
Attending: NURSE PRACTITIONER
Payer: COMMERCIAL

## 2020-06-24 ENCOUNTER — HOSPITAL ENCOUNTER (OUTPATIENT)
Dept: LAB | Facility: MEDICAL CENTER | Age: 65
End: 2020-06-24
Attending: INTERNAL MEDICINE
Payer: COMMERCIAL

## 2020-06-24 VITALS
OXYGEN SATURATION: 97 % | HEART RATE: 80 BPM | TEMPERATURE: 98 F | BODY MASS INDEX: 18.46 KG/M2 | HEIGHT: 65 IN | RESPIRATION RATE: 16 BRPM | SYSTOLIC BLOOD PRESSURE: 120 MMHG | DIASTOLIC BLOOD PRESSURE: 70 MMHG | WEIGHT: 110.8 LBS

## 2020-06-24 DIAGNOSIS — R10.84 GENERALIZED ABDOMINAL PAIN: ICD-10-CM

## 2020-06-24 DIAGNOSIS — E87.6 HYPOKALEMIA: ICD-10-CM

## 2020-06-24 LAB
ANION GAP SERPL CALC-SCNC: 12 MMOL/L (ref 7–16)
BASOPHILS # BLD AUTO: 0.6 % (ref 0–1.8)
BASOPHILS # BLD: 0.03 K/UL (ref 0–0.12)
BUN SERPL-MCNC: 13 MG/DL (ref 8–22)
CALCIUM SERPL-MCNC: 9.6 MG/DL (ref 8.5–10.5)
CHLORIDE SERPL-SCNC: 103 MMOL/L (ref 96–112)
CO2 SERPL-SCNC: 27 MMOL/L (ref 20–33)
CREAT SERPL-MCNC: 0.88 MG/DL (ref 0.5–1.4)
EOSINOPHIL # BLD AUTO: 0.05 K/UL (ref 0–0.51)
EOSINOPHIL NFR BLD: 1 % (ref 0–6.9)
ERYTHROCYTE [DISTWIDTH] IN BLOOD BY AUTOMATED COUNT: 46.4 FL (ref 35.9–50)
FASTING STATUS PATIENT QL REPORTED: NORMAL
GLUCOSE SERPL-MCNC: 99 MG/DL (ref 65–99)
HCT VFR BLD AUTO: 40.8 % (ref 37–47)
HGB BLD-MCNC: 13.2 G/DL (ref 12–16)
IMM GRANULOCYTES # BLD AUTO: 0.01 K/UL (ref 0–0.11)
IMM GRANULOCYTES NFR BLD AUTO: 0.2 % (ref 0–0.9)
LYMPHOCYTES # BLD AUTO: 2.14 K/UL (ref 1–4.8)
LYMPHOCYTES NFR BLD: 43.1 % (ref 22–41)
MCH RBC QN AUTO: 33 PG (ref 27–33)
MCHC RBC AUTO-ENTMCNC: 32.4 G/DL (ref 33.6–35)
MCV RBC AUTO: 102 FL (ref 81.4–97.8)
MONOCYTES # BLD AUTO: 0.51 K/UL (ref 0–0.85)
MONOCYTES NFR BLD AUTO: 10.3 % (ref 0–13.4)
NEUTROPHILS # BLD AUTO: 2.22 K/UL (ref 2–7.15)
NEUTROPHILS NFR BLD: 44.8 % (ref 44–72)
NRBC # BLD AUTO: 0 K/UL
NRBC BLD-RTO: 0 /100 WBC
PLATELET # BLD AUTO: 160 K/UL (ref 164–446)
PMV BLD AUTO: 12.5 FL (ref 9–12.9)
POTASSIUM SERPL-SCNC: 4.4 MMOL/L (ref 3.6–5.5)
RBC # BLD AUTO: 4 M/UL (ref 4.2–5.4)
SODIUM SERPL-SCNC: 142 MMOL/L (ref 135–145)
WBC # BLD AUTO: 5 K/UL (ref 4.8–10.8)

## 2020-06-24 PROCEDURE — 80048 BASIC METABOLIC PNL TOTAL CA: CPT

## 2020-06-24 PROCEDURE — 36415 COLL VENOUS BLD VENIPUNCTURE: CPT

## 2020-06-24 PROCEDURE — 99214 OFFICE O/P EST MOD 30 MIN: CPT | Performed by: NURSE PRACTITIONER

## 2020-06-24 PROCEDURE — 74021 RADEX ABDOMEN 3+ VIEWS: CPT

## 2020-06-24 PROCEDURE — 85025 COMPLETE CBC W/AUTO DIFF WBC: CPT

## 2020-06-24 ASSESSMENT — FIBROSIS 4 INDEX: FIB4 SCORE: 1.9

## 2020-06-26 ENCOUNTER — PATIENT MESSAGE (OUTPATIENT)
Dept: MEDICAL GROUP | Facility: PHYSICIAN GROUP | Age: 65
End: 2020-06-26

## 2020-06-26 ENCOUNTER — TELEPHONE (OUTPATIENT)
Dept: MEDICAL GROUP | Facility: PHYSICIAN GROUP | Age: 65
End: 2020-06-26

## 2020-06-26 NOTE — TELEPHONE ENCOUNTER
----- Message from Carlee Zavala sent at 6/26/2020  3:32 PM PDT -----  Regarding: RE:Earlier Appointments Available 6/29/20  Contact: [phone]  Hi . I am open for a Early appt. Been checking my chart hourly.... Just got the text ... not sure why it took so long for it to show up.so either you can call or I will check MyChart... I have been checking [facility] looking for my tests results..all day ..just got your message.thank you.  I did call. But after 3 person I did hang up. Hope this is faster..again thank you. Carlee zavala

## 2020-06-29 ENCOUNTER — PATIENT MESSAGE (OUTPATIENT)
Dept: MEDICAL GROUP | Facility: PHYSICIAN GROUP | Age: 65
End: 2020-06-29

## 2020-06-29 ENCOUNTER — OFFICE VISIT (OUTPATIENT)
Dept: MEDICAL GROUP | Facility: PHYSICIAN GROUP | Age: 65
End: 2020-06-29
Payer: COMMERCIAL

## 2020-06-29 VITALS
RESPIRATION RATE: 14 BRPM | TEMPERATURE: 98.3 F | BODY MASS INDEX: 18.16 KG/M2 | DIASTOLIC BLOOD PRESSURE: 84 MMHG | HEIGHT: 65 IN | WEIGHT: 109 LBS | OXYGEN SATURATION: 97 % | SYSTOLIC BLOOD PRESSURE: 128 MMHG | HEART RATE: 98 BPM

## 2020-06-29 DIAGNOSIS — Z23 NEED FOR VACCINATION: ICD-10-CM

## 2020-06-29 DIAGNOSIS — G43.009 MIGRAINE WITHOUT AURA AND WITHOUT STATUS MIGRAINOSUS, NOT INTRACTABLE: Chronic | ICD-10-CM

## 2020-06-29 DIAGNOSIS — M81.0 AGE RELATED OSTEOPOROSIS, UNSPECIFIED PATHOLOGICAL FRACTURE PRESENCE: Chronic | ICD-10-CM

## 2020-06-29 PROBLEM — E87.6 HYPOKALEMIA: Status: RESOLVED | Noted: 2020-06-16 | Resolved: 2020-06-29

## 2020-06-29 PROCEDURE — 90471 IMMUNIZATION ADMIN: CPT | Performed by: INTERNAL MEDICINE

## 2020-06-29 PROCEDURE — 99214 OFFICE O/P EST MOD 30 MIN: CPT | Mod: 25 | Performed by: INTERNAL MEDICINE

## 2020-06-29 PROCEDURE — 90715 TDAP VACCINE 7 YRS/> IM: CPT | Performed by: INTERNAL MEDICINE

## 2020-06-29 ASSESSMENT — FIBROSIS 4 INDEX: FIB4 SCORE: 2.065591117977289006

## 2020-06-29 ASSESSMENT — PATIENT HEALTH QUESTIONNAIRE - PHQ9: CLINICAL INTERPRETATION OF PHQ2 SCORE: 0

## 2020-06-29 NOTE — LETTER
[facility]  Erlin Colón M.D.  [address]  [place] [address]  Fax: [phone]   Authorization for Release/Disclosure of   Protected Health Information   Name: CARLEE ZAVALA : 1955 SSN: [id number]   Address: [address] [place] [address] [place] [address] Phone:    [phone] (home)    I authorize the entity listed below to release/disclose the PHI below to:   [facility]/Erlin Colón M.D. and Erlin Colón M.D.   Provider or Entity Name:Luna Sutton M.D.     Address   City, [facility]   Phone:      Fax:[phone]     Reason for request: continuity of care   Information to be released:    [  ] LAST COLONOSCOPY,  including any PATH REPORT and follow-up  [  ] LAST FIT/COLOGUARD RESULT [  ] LAST DEXA  [  ] LAST MAMMOGRAM  [ XXX ] LAST PAP  [  ] LAST LABS [  ] RETINA EXAM REPORT  [  ] IMMUNIZATION RECORDS  [  ] Release all info      [  ] Check here and initial the line next to each item to release ALL health information INCLUDING  _____ Care and treatment for drug and / or alcohol abuse  _____ HIV testing, infection status, or AIDS  _____ Genetic Testing    DATES OF SERVICE OR TIME PERIOD TO BE DISCLOSED: _____________  I understand and acknowledge that:  * This Authorization may be revoked at any time by you in writing, except if your health information has already been used or disclosed.  * Your health information that will be used or disclosed as a result of you signing this authorization could be re-disclosed by the recipient. If this occurs, your re-disclosed health information may no longer be protected by State or Federal laws.  * You may refuse to sign this Authorization. Your refusal will not affect your ability to obtain treatment.  * This Authorization becomes effective upon signing and will  on (date) __________.      If no date is indicated, this Authorization will  one (1) year from the signature date.    Name: Carlee Zavala    Signature: Continuity of care   Date:     6/29/2020       PLEASE FAX REQUESTED RECORDS BACK TO: [phone]

## 2020-06-29 NOTE — ASSESSMENT & PLAN NOTE
"Recent DEXA scan showed osteoporosis.  The patient was started on Fosamax.  However the patient stated that the medication \"made me sick after taking it\"  Patient has stopped taking the medication and that she is now back to normal baseline.  Patient dated that she has been taking vitamin D and calcium as well as weightbearing exercises.  I have discussed with the patient regarding other options such as Prolia however the patient request to see endocrinologist  Patient requested referral to endocrinology to discuss treatment options.  "

## 2020-06-29 NOTE — PROGRESS NOTES
"CC: Recurrent headaches   Osteoporosis medication problem    HPI: 64 y.o. Patient presents to discuss the following:     Osteoporosis  Recent DEXA scan showed osteoporosis.  The patient was started on Fosamax.  However the patient stated that the medication \"made me sick after taking it\"  Patient has stopped taking the medication and that she is now back to normal baseline.  Patient dated that she has been taking vitamin D and calcium as well as weightbearing exercises.  I have discussed with the patient regarding other options such as Prolia however the patient request to see endocrinologist  Patient requested referral to endocrinology to discuss treatment options.    Migraine without aura and without status migrainosus, not intractable  This is a chronic condition, uncontrolled.  Patient reported having headache for several years and her symptoms seem to get worse within the last 1 year.  Location: Temporal parietal and occasionally occipital.  Could be unilateral or bilateral  Context: Patient denies any recent trauma or injury.  No change in vision motor weakness or paresthesia.  Severity: Moderate to severe  Quality: Sharp occasionally throbbing pain  Duration: Hours 2 days  Modifying factors: Stress may worsen her symptoms.  S  Associated symptoms: Patient denies fever chills change in vision.  She noted intermittent numbness on her fingertips and her toes  Patient has been taking sumatriptan without significant improvement.            REVIEW OF SYSTEMS:     Constitutional:  no fever / chills   Eyes: no changes in vision  ENT: no sore throat, no hearing loss  CV:  no chest pain, no palpitations  Pulmonary: no SOB, no cough    GI: no nausea / vomiting, no diarrhea, no constipation  :  no dysuria, no hematuria   Skin: no rash  Hematologic: no bleeding      Allergies: Sulfa drugs and Fosamax    Current Outpatient Medications Ordered in Epic   Medication Sig Dispense Refill   • Doxylamine Succinate, Sleep, " (SLEEP AID PO) Take 2 Tabs by mouth every bedtime.     • ALPRAZolam (XANAX) 0.5 MG Tab Take 0.5 mg by mouth 3 times a day as needed for Anxiety.     • tizanidine (ZANAFLEX) 4 MG Tab Take 4 mg by mouth every 6 hours as needed.     • sumatriptan (IMITREX) 100 MG tablet Take 100 mg by mouth 1 time daily as needed.       No current Epic-ordered facility-administered medications on file.        Past Medical History:   Diagnosis Date   • Anemia    • Blood transfusion without reported diagnosis    • Urinary bladder disorder     hx of intersitial cystitis x 20 years        Past Surgical History:   Procedure Laterality Date   • PB LAP, SURG PROCTOPEXY  1/21/2020    Procedure: RECTOPEXY, ROBOT-ASSISTED, LAPAROSCOPIC, USING DA [place]IR[place]IE XI;  Surgeon: Daniel Palacio M.D.;  Location: SURGERY [facility];  Service: General   • LOW ANTERIOR RESECTION ROBOTIC XI  1/21/2020    Procedure: RESECTION, LOW ANTERIOR, ROBOT-ASSISTED, LAPAROSCOPIC, USING DA [place] XI;  Surgeon: Daniel Palacio M.D.;  Location: SURGERY [facility];  Service: General        Family History   Problem Relation Age of Onset   • Hypertension Mother    • Hypertension Father         Social History     Tobacco Use   Smoking Status Never Smoker   Smokeless Tobacco Never Used          Social History     Substance and Sexual Activity   Alcohol Use Not Currently        ---------------------------------------------------------------------     PHYSICAL EXAM:   Vitals:    06/29/20 1019   BP: 128/84   Pulse: 98   Resp: 14   Temp: 36.8 °C (98.3 °F)   SpO2: 97%      Body mass index is 18.14 kg/m².        Constitutional: no acute distress  Eyes: PERRL, EOMI  Ears/nose/mouth: OP no exudates  Neck: supple, no JVD  CV: heart RRR  Resp: normal effort, no wheezing or rales.  GI: abdomen soft, no obvious mass, no tenderness  Neuro: CN 2-12 grossly intact  Skin: no obvious rash noted        ---------------------------------------------------------------------     ASSESSMENT  and PLAN:  1. Need for vaccination    - Tdap Vaccine =>6YO IM    2. Age related osteoporosis, unspecified pathological fracture presence  Patient is intolerant to Fosamax.  Discussed with the patient regarding the use of other medication such as Prolia  Patient declined at this time.  We will refer to endocrinology per patient request to discuss treatment options  - REFERRAL TO ENDOCRINOLOGY    3. Migraine without aura and without status migrainosus, not intractable  This is a chronic condition, uncontrolled.  - CT-HEAD W/O; Future  - REFERRAL TO NEUROLOGY  Advised the patient to go to ER urgent care if symptoms severe or any change in her condition.        Return in about 5 months (around 11/29/2020) for routine followup.       PATIENT EDUCATION:  -If any problems should arise, patient was advised to contact our office or go to ER to be evaluated.  -Advised pt to follow a healthy diet and regular aerobic exercise regimen. Advised pt to avoid alcohol and tobacco use.    Please note that this dictation was created using voice recognition software. I have made every reasonable attempt to correct obvious errors, but it is possible there are errors of grammar and possibly content that I did not discover before finalizing the note.

## 2020-06-29 NOTE — ASSESSMENT & PLAN NOTE
This is a chronic condition, uncontrolled.  Patient reported having headache for several years and her symptoms seem to get worse within the last 1 year.  Location: Temporal parietal and occasionally occipital.  Could be unilateral or bilateral  Context: Patient denies any recent trauma or injury.  No change in vision motor weakness or paresthesia.  Severity: Moderate to severe  Quality: Sharp occasionally throbbing pain  Duration: Hours 2 days  Modifying factors: Stress may worsen her symptoms.  S  Associated symptoms: Patient denies fever chills change in vision.  She noted intermittent numbness on her fingertips and her toes  Patient has been taking sumatriptan without significant improvement.

## 2020-06-30 PROBLEM — R10.84 GENERALIZED ABDOMINAL PAIN: Status: ACTIVE | Noted: 2020-06-30

## 2020-06-30 NOTE — ASSESSMENT & PLAN NOTE
Patient states that she was recently hospitalized for a perforated bowel, however the notes report a small bowel obstruction.  She states that she continues to have abdominal discomfort, and she still isn't feeling like herself.  She has not followed up with GI like she was supposed to.  Discussed plan to proceed with an abdominal xray and some labs, and she was encouraged to go back to the ER if necessary.  Otherwise she will follow-up with GI and Dr. Colón as directed.

## 2020-06-30 NOTE — PROGRESS NOTES
Chief Complaint   Patient presents with   • Hospital Follow-up     Perferrated bowel       HISTORY OF PRESENT ILLNESS: Patient is a 64 y.o. female established patient who presents today to discuss the following issues:    Generalized abdominal pain  Patient states that she was recently hospitalized for a perforated bowel, however the notes report a small bowel obstruction.  She states that she continues to have abdominal discomfort, and she still isn't feeling like herself.  She has not followed up with GI like she was supposed to.  Discussed plan to proceed with an abdominal xray and some labs, and she was encouraged to go back to the ER if necessary.  Otherwise she will follow-up with GI and Dr. Colón as directed.      Patient Active Problem List    Diagnosis Date Noted   • Abnormal radiologic density 06/16/2020     Priority: Low   • Osteoporosis 07/11/2017     Priority: Low   • Generalized abdominal pain 06/30/2020   • Dense breast 06/03/2020   • Runny nose 04/16/2020   • Interstitial cystitis 04/16/2020   • Chronic back pain 01/21/2020   • Chronic use of benzodiazepine for therapeutic purpose 12/13/2019   • Migraine without aura and without status migrainosus, not intractable 12/12/2019   • Frequent falls 12/12/2019   • Left-sided sensorineural hearing loss 12/12/2019   • Herpes simplex infection of genitourinary system 12/12/2019       Allergies:Sulfa drugs and Fosamax    Current Outpatient Medications   Medication Sig Dispense Refill   • Doxylamine Succinate, Sleep, (SLEEP AID PO) Take 2 Tabs by mouth every bedtime.     • ALPRAZolam (XANAX) 0.5 MG Tab Take 0.5 mg by mouth 3 times a day as needed for Anxiety.     • tizanidine (ZANAFLEX) 4 MG Tab Take 4 mg by mouth every 6 hours as needed.     • sumatriptan (IMITREX) 100 MG tablet Take 100 mg by mouth 1 time daily as needed.       No current facility-administered medications for this visit.        Social History     Tobacco Use   • Smoking status: Never Smoker  "  • Smokeless tobacco: Never Used   Substance Use Topics   • Alcohol use: Not Currently   • Drug use: Not Currently       Family Status   Relation Name Status   • Mo     • Fa       Family History   Problem Relation Age of Onset   • Hypertension Mother    • Hypertension Father        Review of Systems:   Constitutional: Negative for fever, chills, weight loss and malaise/fatigue.   HENT: Negative for ear pain, nosebleeds, congestion, sore throat and neck pain.    Eyes: Negative for blurred vision.   Respiratory: Negative for cough, sputum production, shortness of breath and wheezing.    Cardiovascular: Negative for chest pain, palpitations, orthopnea and leg swelling.   Gastrointestinal: Negative for heartburn, nausea, vomiting.  Positive for vague abdominal pain.   Genitourinary: Negative for dysuria, urgency and frequency.   Musculoskeletal: Negative for myalgias, joint pain, and back pain.  Skin: Negative for rash and itching.   Neurological: Negative for dizziness, tingling, tremors, sensory change, focal weakness and headaches.   Endo/Heme/Allergies: Does not bruise/bleed easily.   Psychiatric/Behavioral: Negative for depression, suicidal ideas and memory loss.  The patient is not nervous/anxious and does not have insomnia.    All other systems reviewed and are negative except as in HPI.    Exam:  Blood Pressure 120/70 (BP Location: Left arm, Patient Position: Sitting, BP Cuff Size: Adult)   Pulse 80   Temperature 36.7 °C (98 °F) (Temporal)   Respiration 16   Height 1.651 m (5' 5\")   Weight 50.3 kg (110 lb 12.8 oz)   Oxygen Saturation 97%   General:  Well nourished, well developed female in NAD  Head: Grossly normal.  Neck: Supple without JVD or bruit. Thyroid is not enlarged.  Pulmonary: Clear to ausculation. Normal effort. No rales, ronchi, or wheezing.  Cardiovascular: Regular rate and rhythm without murmur.   Abdomen:  Soft, nontender, nondistended. No rebound, no " guarding.  Extremities: No clubbing, cyanosis, or edema.  Skin: Intact with no obvious rashes or lesions.  Neuro: Grossly intact.  Psych: Alert and oriented x 3.  Mood and affect appropriate.    Medical decision-making and discussion: Carlee is here today for follow-up.  Her chart and hospital records were reviewed, lab work and xray were ordered, and she will follow-up here as needed.          Assessment/Plan:  1. Generalized abdominal pain  DX-ABDOMEN-3+ VIEWS    CBC WITH DIFFERENTIAL    Basic Metabolic Panel   2. Hypokalemia  Basic Metabolic Panel       Return if symptoms worsen or fail to improve.    Please note that this dictation was created using voice recognition software. I have made every reasonable attempt to correct obvious errors, but I expect that there are errors of grammar and possibly content that I did not discover before finalizing the note.

## 2020-07-06 ENCOUNTER — HOSPITAL ENCOUNTER (OUTPATIENT)
Dept: RADIOLOGY | Facility: MEDICAL CENTER | Age: 65
End: 2020-07-06
Attending: INTERNAL MEDICINE
Payer: COMMERCIAL

## 2021-01-25 NOTE — ASSESSMENT & PLAN NOTE
Takes Alprazolam TID  Switch to IV lorazepam injection until taking PO   Patient information on fall and injury prevention

## 2021-02-19 ENCOUNTER — OFFICE VISIT (OUTPATIENT)
Dept: NEUROLOGY | Facility: MEDICAL CENTER | Age: 66
End: 2021-02-19
Attending: STUDENT IN AN ORGANIZED HEALTH CARE EDUCATION/TRAINING PROGRAM
Payer: MEDICARE

## 2021-02-19 VITALS
BODY MASS INDEX: 19.2 KG/M2 | OXYGEN SATURATION: 96 % | TEMPERATURE: 99.7 F | WEIGHT: 112.43 LBS | HEART RATE: 119 BPM | DIASTOLIC BLOOD PRESSURE: 74 MMHG | HEIGHT: 64 IN | SYSTOLIC BLOOD PRESSURE: 120 MMHG

## 2021-02-19 DIAGNOSIS — M54.18 RADICULOPATHY OF SACRAL REGION: ICD-10-CM

## 2021-02-19 DIAGNOSIS — H91.8X3 OTHER SPECIFIED HEARING LOSS OF BOTH EARS: ICD-10-CM

## 2021-02-19 DIAGNOSIS — R51.9 CHRONIC INTRACTABLE HEADACHE, UNSPECIFIED HEADACHE TYPE: ICD-10-CM

## 2021-02-19 DIAGNOSIS — R20.0 NUMBNESS AND TINGLING OF BOTH FEET: ICD-10-CM

## 2021-02-19 DIAGNOSIS — M24.20: ICD-10-CM

## 2021-02-19 DIAGNOSIS — R20.2 NUMBNESS AND TINGLING OF BOTH FEET: ICD-10-CM

## 2021-02-19 DIAGNOSIS — Z00.00 HEALTHCARE MAINTENANCE: ICD-10-CM

## 2021-02-19 DIAGNOSIS — M54.81 BILATERAL OCCIPITAL NEURALGIA: ICD-10-CM

## 2021-02-19 DIAGNOSIS — M25.542 ARTHRALGIA OF LEFT HAND: ICD-10-CM

## 2021-02-19 DIAGNOSIS — R27.8 SENSORY ATAXIA: ICD-10-CM

## 2021-02-19 DIAGNOSIS — M54.2 CERVICAL PAIN (NECK): ICD-10-CM

## 2021-02-19 DIAGNOSIS — G89.29 CHRONIC INTRACTABLE HEADACHE, UNSPECIFIED HEADACHE TYPE: ICD-10-CM

## 2021-02-19 DIAGNOSIS — M25.50 PAIN IN JOINT, MULTIPLE SITES: ICD-10-CM

## 2021-02-19 DIAGNOSIS — G62.9 NEUROPATHY: ICD-10-CM

## 2021-02-19 DIAGNOSIS — R26.89 BALANCE PROBLEM: ICD-10-CM

## 2021-02-19 DIAGNOSIS — M51.36 OTHER INTERVERTEBRAL DISC DEGENERATION, LUMBAR REGION: ICD-10-CM

## 2021-02-19 DIAGNOSIS — M53.3 SI (SACROILIAC) PAIN: ICD-10-CM

## 2021-02-19 DIAGNOSIS — R19.7 DIARRHEA, UNSPECIFIED TYPE: ICD-10-CM

## 2021-02-19 DIAGNOSIS — R25.2 LEG CRAMPING: ICD-10-CM

## 2021-02-19 DIAGNOSIS — R29.898 LEFT HAND WEAKNESS: ICD-10-CM

## 2021-02-19 DIAGNOSIS — R50.9 RECENT UNEXPLAINED FEVER: ICD-10-CM

## 2021-02-19 PROCEDURE — 99211 OFF/OP EST MAY X REQ PHY/QHP: CPT | Performed by: STUDENT IN AN ORGANIZED HEALTH CARE EDUCATION/TRAINING PROGRAM

## 2021-02-19 PROCEDURE — 99205 OFFICE O/P NEW HI 60 MIN: CPT | Performed by: STUDENT IN AN ORGANIZED HEALTH CARE EDUCATION/TRAINING PROGRAM

## 2021-02-19 RX ORDER — GABAPENTIN 300 MG/1
300 CAPSULE ORAL 3 TIMES DAILY
COMMUNITY

## 2021-02-19 RX ORDER — OLOPATADINE HYDROCHLORIDE 1 MG/ML
1 SOLUTION/ DROPS OPHTHALMIC 2 TIMES DAILY
COMMUNITY
End: 2021-08-30

## 2021-02-19 ASSESSMENT — FIBROSIS 4 INDEX: FIB4 SCORE: 2.1

## 2021-02-19 ASSESSMENT — PATIENT HEALTH QUESTIONNAIRE - PHQ9: CLINICAL INTERPRETATION OF PHQ2 SCORE: 0

## 2021-02-19 NOTE — PROGRESS NOTES
GENERAL NEUROLOGY INITIAL ENCOUNTER  REFERRING PROVIDER:  Erlin Colón M.D.  [address] [place] [id number]      CHIEF COMPLAINT(S): headaches and numbness and dysesthesias    History of present illness:  Carlee Zavala 65 y.o. female presents today for subacute onset of headaches and progressive numbness.  Symptoms started several months ago.  She noticed some painful dysesthesias in her hands and feet in between periods of numbness particularly in her bilateral feet.  Initially episodic but becoming more chronic now.  It is affecting her gait to the point where she has to look down at her feet and at times wall skirt to maintain balance.  Her disequilibrium feels off.  Left hand is a little bit worse in terms of numbness and dysesthesias in the distal hand and fingers.  Also noticed some weakness there as well.  She has been dropping things in her left hand more frequently.   Left hand tendoninits.  Couple weeks ago there was some report of fever.  In the past couple months also she has had some chronic watery diarrhea.  She also has joint and tendon pain in her hands and ankles.  She has some radicular lumbar pain radiating down her posterior lateral legs left more than right.  She also has bilateral sacroiliac pain.  There is some redness around her joints as well.  She has some central cervical neck pain as well.  Denies any recent trauma.  Sometimes the pain will be sporadically sharp particularly in her left hand.  She was told that she had gout.    Around the same time of these sensory complaints she developed a change from her migraine headaches that she has been having for years.  Specifically they more frequent and intense happening a couple days in a week.  They typically start in the back of her head and neck and radiate anteriorly to the front of her head as well as the temple region.  This moderate to severe in intensity.  Sharp as well as throbbing quality of pain.  Typically they  last 2 hours.  Think stress is a trigger.  Denies any changes in her vision.  Has tried sumatriptan without major improvement as well.      Sharp pain, in left wrist    Balance is off as well.    Also reports episodic dizziness especially when standing recently.    Patient's PMH, PSH, FH, and SH were reviewed. No Autoimmunity in her family that she is aware.    Medications and allergies were reviewed.         Past medical history:   Past Medical History:   Diagnosis Date   • Anemia    • Blood transfusion without reported diagnosis    • Urinary bladder disorder     hx of intersitial cystitis x 20 years       Past surgical history:   Past Surgical History:   Procedure Laterality Date   • PB LAP, SURG PROCTOPEXY  1/21/2020    Procedure: RECTOPEXY, ROBOT-ASSISTED, LAPAROSCOPIC, USING DA VIRGINIE XI;  Surgeon: Daniel Palacio M.D.;  Location: SURGERY [facility];  Service: General   • LOW ANTERIOR RESECTION ROBOTIC XI  1/21/2020    Procedure: RESECTION, LOW ANTERIOR, ROBOT-ASSISTED, LAPAROSCOPIC, USING DA VIRGINIE XI;  Surgeon: Daniel Palacoi M.D.;  Location: SUR[facility]ERY [facility];  Service: General       Family history:   Family History   Problem Relation Age of Onset   • Hypertension Mother    • Hypertension Father        Social history:   Social History     Socioeconomic History   • Marital status:      Spouse name: Not on file   • Number of children: Not on file   • Years of education: Not on file   • Highest education level: Not on file   Occupational History   • Not on file   Tobacco Use   • Smoking status: Never Smoker   • Smokeless tobacco: Never Used   Substance and Sexual Activity   • Alcohol use: Not Currently   • Drug use: Not Currently   • Sexual activity: Not Currently   Other Topics Concern   • Not on file   Social History Narrative   • Not on file     Social Determinants of Health     Financial Resource Strain:    • Difficulty of Paying Living Expenses:    Food Insecurity: No Food Insecurity    • Worried About Running Out of Food in the Last Year: Never true   • Ran Out of Food in the Last Year: Never true   Transportation Needs: No Transportation Needs   • Lack of Transportation (Medical): No   • Lack of Transportation (Non-Medical): No   Physical Activity:    • Days of Exercise per Week:    • Minutes of Exercise per Session:    Stress:    • Feeling of Stress :    Social Connections:    • Frequency of Communication with Friends and Family:    • Frequency of Social Gatherings with Friends and Family:    • Attends Voodoo Services:    • Active Member of Clubs or Organizations:    • Attends Club or Organization Meetings:    • Marital Status:    Intimate Partner Violence:    • Fear of Current or Ex-Partner:    • Emotionally Abused:    • Physically Abused:    • Sexually Abused:        Current medications:   Current Outpatient Medications   Medication   • gabapentin (NEURONTIN) 300 MG Cap   • olopatadine (PATANOL) 0.1 % ophthalmic solution   • Doxylamine Succinate, Sleep, (SLEEP AID PO)   • ALPRAZolam (XANAX) 0.5 MG Tab   • tizanidine (ZANAFLEX) 4 MG Tab   • sumatriptan (IMITREX) 100 MG tablet     No current facility-administered medications for this visit.       Medication Allergy:  Allergies   Allergen Reactions   • Sulfa Drugs      hives   • Fosamax          Review of systems:   Pertinent positives and negatives are as outlined above      Physical examination:   Vitals:    02/19/21 1253   BP: 120/74   Pulse: (!) 119   Temp: 37.6 °C (99.7 °F)   SpO2: 96%       General: Patient in no acute distress, pleasant and cooperative.  HEENT: Normocephalic, no signs of acute trauma.  Bilateral occipital tenderness  Neck: appears supple, here is normal range of motion. Mild SI and upper C spine midline tenderness with no stepoff.   Chest: clear to auscultation. Symmetrical chest rise with inhalation. No cough.   CV: RRR, no murmurs.   Skin: no signs of acute rashes or trauma.   Musculoskeletal: tenderness of the  ulnar aspect of her left wrist, with mild erythema and decreased ROM.  Psychiatric: pertinent positives as discussed above    NEUROLOGICAL EXAM:   Mental status:  orientation: Awake, alert and oriented to self, month, year, situation.  Attention: Intact  Speech and language: speech is clear and fluent. The patient is able to name, repeat and comprehend. No word substitions or paraphasic errors  Memory: There is intact recollection of recent and remote events.   Cranial nerve exam:   I: smell Not tested   II: visual acuity  Not Tested   II: Fundoscpic No papilledema and normal optic disc bilaterally   II: visual fields Full to confrontation  Visual neglect: absent   II: pupils Equal, round, reactive to light   III,VII: ptosis None   III,IV,VI: extraocular muscles  EOMI   V: mastication Normal   V: facial light touch sensation  Normal   V,VII: corneal reflex  Not tested   VII: facial muscle function - upper  Normal   VII: facial muscle function - lower Normal   VIII: hearing Not tested   IX: soft palate elevation  Normal   IX,X: gag reflex Not tested   XI: trapezius strength  NT   XI: sternocleidomastoid strength NT   XI: neck flexion strength  NT   XII: tongue  Protrudes Midline     Motor exam:   • Mild weakness in her left , partially pain limited weakness although there is some mild thenar atrophy noted  • Tone is normal.  • No abnormal movements were seen on exam.   • No muscle fasciculation  Sensory exam reveals normal sense of light touch, proprioception, vibration and pinprick in all extremities. Cortical sensory testing: Normal. Test of neglect: none present to simultaneous stimulation with touch  Deep tendon reflexes:  2+ AJ b/l. 2+ left patella. 3+ right patella. + Right cross adductor. Brisk reflexes in bilateral biceps and BR R>L. diminished left tricep reflex. throughout. Plantar responses  Coordination: shows a normal finger-nose-finger. Normal rapidly alternating movements. Heel-knee-shin movements  smooth and coordinated bilaterally.   Gait:   • Somewhat wide based gait, cautiously looking at her feet the whole time as well as wall surfing  • Romberg exam postivie        ANCILLARY DATA REVIEWED:       Lab Data Review:  Reviewed    Records reviewed:   Reviewed    Imaging:   Reviewed    EEG:  NA      ASSESSMENT, PLAN, EDUCATION/COUNSELIN-year-old female with progressive sensory loss and dysesthesias in her distal extremities, subacute onset of a new headache phenotype in the past couple months.  This is in the setting of multifocal joint pain with subtle inflammation as noted above.  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  She also has symptoms suggestive of a recurrent radiculopathy particularly in the lumbosacral plexus which may be causing a double hit phenomenon exacerbating her pain symptoms as well as her gait impairment.  Notable on her exam is the fact that she has either preserved or brisk reflexes particularly on the right which suggest more of an upper motor neuron pathology so this raises the question of a possible myelopathy although there was no hard exam findings such as a sensory level on my exam.  Regardless with her progressive symptoms and upper and lower back pain with radicular symptoms I think it warrants a broad work-up that includes infectious etiologies, endocrinological, autoimmune and inflammatory, granulomatous, compressive, toxic/metabolic, vitamin deficiency.  We had an extensive discussion about this and that it will take some time to gather some data about her neuropathy.  Common things being common is possible that much of her symptoms are in large part due to a metabolic diabetic sensorimotor neuropathy but some red flag symptoms in her history  such as recent fever, repetitive her symptoms, worsening and change in her headache warrant a broader work-up.  We discussed doing a nerve conduction study with EMG to evaluate both her nerves and muscles.  We will also order some films of her C-spine and hand to evaluate for inflammatory or noninflammatory arthritic changes.  We will also order a lumbar spine MRI with and without contrast.  Depending on what her initial work-up shows an if her symptoms should continue to progress we may have to do a lumbar punctu      Regarding her headaches patient had exquisite bilateral occipital tenderness suggestive of occipital neuralgia.  I will plan to do a occipital nerve block in a couple weeks to see if this helps with her pain/headache symptoms.    Finally given her recent fever couple weeks ago in the setting of a change in her headache along with new neurological and progressive symptoms this does warrant a brain MRI with and without contrast to rule out structural/inflammatory/infectious causes of her symptoms which would not be adequately visualized with the CT head that she obtain previously.    Patient/family agree with plan, as outlined:    Visit Diagnoses     ICD-10-CM   1. Bilateral occipital neuralgia  M54.81   2. Ligamentous inflammation  M24.20   3. Cervical pain (neck)  M54.2   4. Chronic intractable headache, unspecified headache type  R51.9    G89.29   5. Numbness and tingling of both feet  R20.0    R20.2   6. Neuropathy  G62.9   7. Left hand weakness  R29.898   8. Radiculopathy of sacral region  M54.18   9. Balance problem  R26.89   10. Sensory ataxia  R27.8   11. Other specified hearing loss of both ears  H91.8X3   12. SI (sacroiliac) pain  M53.3   13. Pain in joint, multiple sites  M25.50   14. Arthralgia of left hand  M25.542   15. Other intervertebral disc degeneration, lumbar region  M51.36   16. Recent unexplained fever  R50.9   17. Diarrhea, unspecified type  R19.7   18. Healthcare maintenance   Z00.00   19. Leg cramping  R25.2        Orders Placed This Encounter   • Nerve Block   • DX-SACROILIAC JOINTS 3+   • DX-JOINT SURVEY-HANDS SINGLE VIEW   • DX-JOINT SURVEY-FEET SINGLE VIEW   • MR-LUMBAR SPINE-WITH & W/O   • DX-CERVICAL SPINE-2 OR 3 VIEWS   • MR-BRAIN-WITH & W/O   • HLA-B27   • Sed Rate   • CBC WITH DIFFERENTIAL   • URINALYSIS   • Comp Metabolic Panel   • LUPUS COMPREHENSIVE PANEL   • SJOGREN'S ANTIBODIES   • CCP ANTIBODY   • RHEUMATOID ARTHRITIS FACTOR   • JAKI IGG MESFIN W/RFLX TO JAKI IGG IFA   • CONNECTIVE TISSUE DISEASES PROFILE   • CELIAC DISEASE GENOTYPING   • FOLATE   • HEMOGLOBIN A1C   • HIV AG/AB COMBO ASSAY DIAGNOSTIC   • METHYLMALONIC ACID URINE RANDOM   • SPEP W/REFLEX TO SARAH, A, G, M   • Basic Metabolic Panel   • HEPATIC FUNCTION PANEL   • PT AND PTT   • Lipid Profile   • TSH+FREE T4   • CREATINE KINASE   • COPY TO PATIENT   • URIC ACID, SERUM   • IRON/TOTAL IRON BIND   • COMPLEMENT TOTAL (CH50)   • COPPER, SERUM   • PTH WITH CALCIUM   • VITAMIN E   • VITAMIN D 25-HYDROXY   • VITAMIN B6   • VITAMIN B12   • VITAMIN B1   • FERRITIN   • MAGNESIUM   • PHOSPHORUS   • ANCA PANEL   • PARIETAL CELL ABS   • ASIALO GM1 ANTIBODY, IGG,IGM   • HEAVY METALS URINE, 24HR QUANT   • Campylobacter Ag   • MYCOPLASMA PNEUMONIA IGG/IGM ABS   • Quantiferon Gold TB (PPD)   • ANGIOTENSIN I CONVERTING ENZYME   • REFERRAL TO NEURODIAGNOSTICS (EEG,EP,EMG/NCS/DBS)   • gabapentin (NEURONTIN) 300 MG Cap   • olopatadine (PATANOL) 0.1 % ophthalmic solution            FOLLOW-UP:   6 weeks            BILLING DOCUMENTATION:       Counseling:  I spent a total of 63 minutes of face-to-face time in this visit. Over 50% of the time of the visit today was spent on counseling and or coordination of care wtih the patient and/or family, as above in assessment in plan.       Obed Downs MD  Epilepsy and General Neurology  Department of Neurology  Clinical  of Neurology [facility]  Medicine.

## 2021-03-03 DIAGNOSIS — Z23 NEED FOR VACCINATION: ICD-10-CM

## 2021-03-12 ENCOUNTER — IMMUNIZATION (OUTPATIENT)
Dept: FAMILY PLANNING/WOMEN'S HEALTH CLINIC | Facility: IMMUNIZATION CENTER | Age: 66
End: 2021-03-12
Attending: INTERNAL MEDICINE
Payer: MEDICARE

## 2021-03-12 DIAGNOSIS — Z23 ENCOUNTER FOR VACCINATION: Primary | ICD-10-CM

## 2021-03-12 DIAGNOSIS — Z23 NEED FOR VACCINATION: ICD-10-CM

## 2021-03-12 PROCEDURE — 0011A MODERNA SARS-COV-2 VACCINE: CPT | Performed by: INTERNAL MEDICINE

## 2021-03-12 PROCEDURE — 91301 MODERNA SARS-COV-2 VACCINE: CPT | Performed by: INTERNAL MEDICINE

## 2021-03-16 ENCOUNTER — HOSPITAL ENCOUNTER (OUTPATIENT)
Dept: LAB | Facility: MEDICAL CENTER | Age: 66
End: 2021-03-16
Attending: STUDENT IN AN ORGANIZED HEALTH CARE EDUCATION/TRAINING PROGRAM
Payer: MEDICARE

## 2021-03-16 ENCOUNTER — HOSPITAL ENCOUNTER (OUTPATIENT)
Facility: MEDICAL CENTER | Age: 66
End: 2021-03-16
Attending: STUDENT IN AN ORGANIZED HEALTH CARE EDUCATION/TRAINING PROGRAM
Payer: MEDICARE

## 2021-03-16 DIAGNOSIS — R26.89 BALANCE PROBLEM: ICD-10-CM

## 2021-03-16 DIAGNOSIS — M25.50 PAIN IN JOINT, MULTIPLE SITES: ICD-10-CM

## 2021-03-16 DIAGNOSIS — R20.0 NUMBNESS AND TINGLING OF BOTH FEET: ICD-10-CM

## 2021-03-16 DIAGNOSIS — R19.7 DIARRHEA, UNSPECIFIED TYPE: ICD-10-CM

## 2021-03-16 DIAGNOSIS — R25.2 LEG CRAMPING: ICD-10-CM

## 2021-03-16 DIAGNOSIS — G89.29 CHRONIC INTRACTABLE HEADACHE, UNSPECIFIED HEADACHE TYPE: ICD-10-CM

## 2021-03-16 DIAGNOSIS — R27.8 SENSORY ATAXIA: ICD-10-CM

## 2021-03-16 DIAGNOSIS — Z00.00 HEALTHCARE MAINTENANCE: ICD-10-CM

## 2021-03-16 DIAGNOSIS — R20.2 NUMBNESS AND TINGLING OF BOTH FEET: ICD-10-CM

## 2021-03-16 DIAGNOSIS — R51.9 CHRONIC INTRACTABLE HEADACHE, UNSPECIFIED HEADACHE TYPE: ICD-10-CM

## 2021-03-16 DIAGNOSIS — G62.9 NEUROPATHY: ICD-10-CM

## 2021-03-16 DIAGNOSIS — M54.18 RADICULOPATHY OF SACRAL REGION: ICD-10-CM

## 2021-03-16 DIAGNOSIS — M25.542 ARTHRALGIA OF LEFT HAND: ICD-10-CM

## 2021-03-16 DIAGNOSIS — M53.3 SI (SACROILIAC) PAIN: ICD-10-CM

## 2021-03-16 DIAGNOSIS — R50.9 RECENT UNEXPLAINED FEVER: ICD-10-CM

## 2021-03-16 DIAGNOSIS — R29.898 LEFT HAND WEAKNESS: ICD-10-CM

## 2021-03-16 LAB
25(OH)D3 SERPL-MCNC: 41 NG/ML (ref 30–100)
ALBUMIN SERPL BCP-MCNC: 4.4 G/DL (ref 3.2–4.9)
ALBUMIN/GLOB SERPL: 1.6 G/DL
ALP SERPL-CCNC: 103 U/L (ref 30–99)
ALT SERPL-CCNC: 12 U/L (ref 2–50)
ANION GAP SERPL CALC-SCNC: 11 MMOL/L (ref 7–16)
APPEARANCE UR: CLEAR
APTT PPP: 27.4 SEC (ref 24.7–36)
AST SERPL-CCNC: 13 U/L (ref 12–45)
BACTERIA #/AREA URNS HPF: NEGATIVE /HPF
BASOPHILS # BLD AUTO: 0.8 % (ref 0–1.8)
BASOPHILS # BLD: 0.04 K/UL (ref 0–0.12)
BILIRUB CONJ SERPL-MCNC: <0.2 MG/DL (ref 0.1–0.5)
BILIRUB INDIRECT SERPL-MCNC: ABNORMAL MG/DL (ref 0–1)
BILIRUB SERPL-MCNC: 0.5 MG/DL (ref 0.1–1.5)
BILIRUB UR QL STRIP.AUTO: NEGATIVE
BUN SERPL-MCNC: 17 MG/DL (ref 8–22)
CALCIUM SERPL-MCNC: 9.9 MG/DL (ref 8.5–10.5)
CAOX CRY #/AREA URNS HPF: ABNORMAL /HPF
CHLORIDE SERPL-SCNC: 97 MMOL/L (ref 96–112)
CHOLEST SERPL-MCNC: 206 MG/DL (ref 100–199)
CK SERPL-CCNC: 66 U/L (ref 0–154)
CO2 SERPL-SCNC: 26 MMOL/L (ref 20–33)
COLOR UR: YELLOW
CREAT SERPL-MCNC: 0.97 MG/DL (ref 0.5–1.4)
EOSINOPHIL # BLD AUTO: 0.14 K/UL (ref 0–0.51)
EOSINOPHIL NFR BLD: 2.8 % (ref 0–6.9)
EPI CELLS #/AREA URNS HPF: ABNORMAL /HPF
ERYTHROCYTE [DISTWIDTH] IN BLOOD BY AUTOMATED COUNT: 46.5 FL (ref 35.9–50)
ERYTHROCYTE [SEDIMENTATION RATE] IN BLOOD BY WESTERGREN METHOD: 8 MM/HOUR (ref 0–30)
EST. AVERAGE GLUCOSE BLD GHB EST-MCNC: 123 MG/DL
FASTING STATUS PATIENT QL REPORTED: NORMAL
FERRITIN SERPL-MCNC: 84.6 NG/ML (ref 10–291)
FOLATE SERPL-MCNC: 6.6 NG/ML
GLOBULIN SER CALC-MCNC: 2.8 G/DL (ref 1.9–3.5)
GLUCOSE SERPL-MCNC: 100 MG/DL (ref 65–99)
GLUCOSE UR STRIP.AUTO-MCNC: NEGATIVE MG/DL
HBA1C MFR BLD: 5.9 % (ref 4–5.6)
HCT VFR BLD AUTO: 40.9 % (ref 37–47)
HDLC SERPL-MCNC: 50 MG/DL
HGB BLD-MCNC: 13.3 G/DL (ref 12–16)
HIV 1+2 AB+HIV1 P24 AG SERPL QL IA: NORMAL
HYALINE CASTS #/AREA URNS LPF: ABNORMAL /LPF
IMM GRANULOCYTES # BLD AUTO: 0.01 K/UL (ref 0–0.11)
IMM GRANULOCYTES NFR BLD AUTO: 0.2 % (ref 0–0.9)
INR PPP: 0.92 (ref 0.87–1.13)
IRON SATN MFR SERPL: 41 % (ref 15–55)
IRON SERPL-MCNC: 112 UG/DL (ref 40–170)
KETONES UR STRIP.AUTO-MCNC: NEGATIVE MG/DL
LDLC SERPL CALC-MCNC: 133 MG/DL
LEUKOCYTE ESTERASE UR QL STRIP.AUTO: NEGATIVE
LYMPHOCYTES # BLD AUTO: 2.43 K/UL (ref 1–4.8)
LYMPHOCYTES NFR BLD: 47.9 % (ref 22–41)
MAGNESIUM SERPL-MCNC: 2 MG/DL (ref 1.5–2.5)
MCH RBC QN AUTO: 32.7 PG (ref 27–33)
MCHC RBC AUTO-ENTMCNC: 32.5 G/DL (ref 33.6–35)
MCV RBC AUTO: 100.5 FL (ref 81.4–97.8)
MICRO URNS: ABNORMAL
MONOCYTES # BLD AUTO: 0.48 K/UL (ref 0–0.85)
MONOCYTES NFR BLD AUTO: 9.5 % (ref 0–13.4)
NEUTROPHILS # BLD AUTO: 1.97 K/UL (ref 2–7.15)
NEUTROPHILS NFR BLD: 38.8 % (ref 44–72)
NITRITE UR QL STRIP.AUTO: NEGATIVE
NRBC # BLD AUTO: 0 K/UL
NRBC BLD-RTO: 0 /100 WBC
PH UR STRIP.AUTO: 6.5 [PH] (ref 5–8)
PHOSPHATE SERPL-MCNC: 3 MG/DL (ref 2.5–4.5)
PLATELET # BLD AUTO: 154 K/UL (ref 164–446)
PMV BLD AUTO: 12.6 FL (ref 9–12.9)
POTASSIUM SERPL-SCNC: 3.6 MMOL/L (ref 3.6–5.5)
PROT SERPL-MCNC: 7.2 G/DL (ref 6–8.2)
PROT UR QL STRIP: 30 MG/DL
PROTHROMBIN TIME: 12.7 SEC (ref 12–14.6)
PTH-INTACT SERPL-MCNC: 32.1 PG/ML (ref 14–72)
RBC # BLD AUTO: 4.07 M/UL (ref 4.2–5.4)
RBC # URNS HPF: ABNORMAL /HPF
RBC UR QL AUTO: NEGATIVE
RHEUMATOID FACT SER IA-ACNC: <10 IU/ML (ref 0–14)
SODIUM SERPL-SCNC: 134 MMOL/L (ref 135–145)
SP GR UR STRIP.AUTO: 1.02
T4 FREE SERPL-MCNC: 0.96 NG/DL (ref 0.93–1.7)
TIBC SERPL-MCNC: 274 UG/DL (ref 250–450)
TRIGL SERPL-MCNC: 117 MG/DL (ref 0–149)
TSH SERPL DL<=0.005 MIU/L-ACNC: 6.02 UIU/ML (ref 0.38–5.33)
UIBC SERPL-MCNC: 162 UG/DL (ref 110–370)
URATE SERPL-MCNC: 3.7 MG/DL (ref 1.9–8.2)
UROBILINOGEN UR STRIP.AUTO-MCNC: 0.2 MG/DL
VIT B12 SERPL-MCNC: 1141 PG/ML (ref 211–911)
WBC # BLD AUTO: 5.1 K/UL (ref 4.8–10.8)
WBC #/AREA URNS HPF: ABNORMAL /HPF

## 2021-03-16 PROCEDURE — 82306 VITAMIN D 25 HYDROXY: CPT

## 2021-03-16 PROCEDURE — 86255 FLUORESCENT ANTIBODY SCREEN: CPT

## 2021-03-16 PROCEDURE — 82164 ANGIOTENSIN I ENZYME TEST: CPT

## 2021-03-16 PROCEDURE — 84425 ASSAY OF VITAMIN B-1: CPT

## 2021-03-16 PROCEDURE — 86038 ANTINUCLEAR ANTIBODIES: CPT

## 2021-03-16 PROCEDURE — 84550 ASSAY OF BLOOD/URIC ACID: CPT

## 2021-03-16 PROCEDURE — 84207 ASSAY OF VITAMIN B-6: CPT

## 2021-03-16 PROCEDURE — 86480 TB TEST CELL IMMUN MEASURE: CPT

## 2021-03-16 PROCEDURE — 86738 MYCOPLASMA ANTIBODY: CPT

## 2021-03-16 PROCEDURE — 82525 ASSAY OF COPPER: CPT

## 2021-03-16 PROCEDURE — 82550 ASSAY OF CK (CPK): CPT

## 2021-03-16 PROCEDURE — 84100 ASSAY OF PHOSPHORUS: CPT

## 2021-03-16 PROCEDURE — 86162 COMPLEMENT TOTAL (CH50): CPT

## 2021-03-16 PROCEDURE — 84446 ASSAY OF VITAMIN E: CPT

## 2021-03-16 PROCEDURE — 85025 COMPLETE CBC W/AUTO DIFF WBC: CPT

## 2021-03-16 PROCEDURE — 85652 RBC SED RATE AUTOMATED: CPT

## 2021-03-16 PROCEDURE — 86235 NUCLEAR ANTIGEN ANTIBODY: CPT | Mod: 91

## 2021-03-16 PROCEDURE — 83735 ASSAY OF MAGNESIUM: CPT

## 2021-03-16 PROCEDURE — 83540 ASSAY OF IRON: CPT

## 2021-03-16 PROCEDURE — 81001 URINALYSIS AUTO W/SCOPE: CPT

## 2021-03-16 PROCEDURE — 84443 ASSAY THYROID STIM HORMONE: CPT

## 2021-03-16 PROCEDURE — 84439 ASSAY OF FREE THYROXINE: CPT

## 2021-03-16 PROCEDURE — 83036 HEMOGLOBIN GLYCOSYLATED A1C: CPT

## 2021-03-16 PROCEDURE — 82248 BILIRUBIN DIRECT: CPT

## 2021-03-16 PROCEDURE — 86625 CAMPYLOBACTER ANTIBODY: CPT

## 2021-03-16 PROCEDURE — 86431 RHEUMATOID FACTOR QUANT: CPT | Mod: 91

## 2021-03-16 PROCEDURE — 81376 HLA II TYPING 1 LOCUS LR: CPT | Mod: 91

## 2021-03-16 PROCEDURE — 85610 PROTHROMBIN TIME: CPT

## 2021-03-16 PROCEDURE — 80053 COMPREHEN METABOLIC PANEL: CPT

## 2021-03-16 PROCEDURE — 86200 CCP ANTIBODY: CPT

## 2021-03-16 PROCEDURE — 82607 VITAMIN B-12: CPT

## 2021-03-16 PROCEDURE — 85730 THROMBOPLASTIN TIME PARTIAL: CPT

## 2021-03-16 PROCEDURE — G0475 HIV COMBINATION ASSAY: HCPCS

## 2021-03-16 PROCEDURE — 84155 ASSAY OF PROTEIN SERUM: CPT | Mod: XU

## 2021-03-16 PROCEDURE — 86160 COMPLEMENT ANTIGEN: CPT

## 2021-03-16 PROCEDURE — 83516 IMMUNOASSAY NONANTIBODY: CPT | Mod: 91

## 2021-03-16 PROCEDURE — 36415 COLL VENOUS BLD VENIPUNCTURE: CPT

## 2021-03-16 PROCEDURE — 86812 HLA TYPING A B OR C: CPT

## 2021-03-16 PROCEDURE — 81383 HLA II TYPING 1 ALLELE HR: CPT

## 2021-03-16 PROCEDURE — 82728 ASSAY OF FERRITIN: CPT

## 2021-03-16 PROCEDURE — 82746 ASSAY OF FOLIC ACID SERUM: CPT

## 2021-03-16 PROCEDURE — 83921 ORGANIC ACID SINGLE QUANT: CPT

## 2021-03-16 PROCEDURE — 83550 IRON BINDING TEST: CPT

## 2021-03-16 PROCEDURE — 80061 LIPID PANEL: CPT

## 2021-03-16 PROCEDURE — 84165 PROTEIN E-PHORESIS SERUM: CPT

## 2021-03-16 PROCEDURE — 83970 ASSAY OF PARATHORMONE: CPT

## 2021-03-17 ENCOUNTER — HOSPITAL ENCOUNTER (OUTPATIENT)
Facility: MEDICAL CENTER | Age: 66
End: 2021-03-17
Attending: STUDENT IN AN ORGANIZED HEALTH CARE EDUCATION/TRAINING PROGRAM
Payer: MEDICARE

## 2021-03-17 DIAGNOSIS — G62.9 NEUROPATHY: ICD-10-CM

## 2021-03-17 DIAGNOSIS — R20.2 NUMBNESS AND TINGLING OF BOTH FEET: ICD-10-CM

## 2021-03-17 DIAGNOSIS — R26.89 BALANCE PROBLEM: ICD-10-CM

## 2021-03-17 DIAGNOSIS — R20.0 NUMBNESS AND TINGLING OF BOTH FEET: ICD-10-CM

## 2021-03-17 DIAGNOSIS — R29.898 LEFT HAND WEAKNESS: ICD-10-CM

## 2021-03-17 DIAGNOSIS — R27.8 SENSORY ATAXIA: ICD-10-CM

## 2021-03-17 LAB
GAMMA INTERFERON BACKGROUND BLD IA-ACNC: 0.08 IU/ML
M TB IFN-G BLD-IMP: NEGATIVE
M TB IFN-G CD4+ BCKGRND COR BLD-ACNC: 0.02 IU/ML
MITOGEN IGNF BCKGRD COR BLD-ACNC: >10 IU/ML
QFT TB2 - NIL TBQ2: -0.01 IU/ML

## 2021-03-17 PROCEDURE — 83825 ASSAY OF MERCURY: CPT

## 2021-03-17 PROCEDURE — 82570 ASSAY OF URINE CREATININE: CPT

## 2021-03-17 PROCEDURE — 83655 ASSAY OF LEAD: CPT

## 2021-03-17 PROCEDURE — 84630 ASSAY OF ZINC: CPT

## 2021-03-17 PROCEDURE — 82525 ASSAY OF COPPER: CPT

## 2021-03-17 PROCEDURE — 82300 ASSAY OF CADMIUM: CPT

## 2021-03-17 PROCEDURE — 82175 ASSAY OF ARSENIC: CPT

## 2021-03-18 LAB
ACE SERPL-CCNC: 30 U/L (ref 9–67)
C3 SERPL-MCNC: 114 MG/DL (ref 88–201)
C4 SERPL-MCNC: 30 MG/DL (ref 10–40)
CCP IGG SERPL-ACNC: 3 UNITS (ref 0–19)
CH50 SERPL-ACNC: 86.3 U/ML (ref 38.7–89.9)
COPPER SERPL-MCNC: 123.9 UG/DL (ref 80–155)
M PNEUMO IGG SER IA-ACNC: 0.03 U/L
M PNEUMO IGM SER IA-ACNC: 0.05 U/L
NUCLEAR IGG SER QL IA: NORMAL
PCA IGG SER-ACNC: 53.1 UNITS (ref 0–24.9)
RHEUMATOID FACT SER NEPH-ACNC: <10 IU/ML (ref 0–14)

## 2021-03-19 LAB
ANCA IGG TITR SER IF: NORMAL {TITER}
ENA SS-B IGG SER IA-ACNC: 1 AU/ML (ref 0–40)
GD1A GANGL AB SER IA-ACNC: 8 IV (ref 0–50)
GD1B GANGL AB SER IA-ACNC: 8 IV (ref 0–50)
GM1 ASIALO AB SER IA-ACNC: 8 IV (ref 0–50)
GM1 GANGL IGG+IGM SER QL IA: 7 IV (ref 0–50)
GM2 GANGL IGG+IGM SER QL IA: 16 IV (ref 0–50)
GQ1B GANGL AB SER-ACNC: 7 IV (ref 0–50)
HLA-B27 QL FC: NEGATIVE
SSA52 R0ENA AB IGG Q0420: 0 AU/ML (ref 0–40)
SSA60 R0ENA AB IGG Q0419: 0 AU/ML (ref 0–40)

## 2021-03-20 LAB
A-TOCOPHEROL VIT E SERPL-MCNC: 8.8 MG/L (ref 5.5–18)
BETA+GAMMA TOCOPHEROL SERPL-MCNC: 0.9 MG/L (ref 0–6)
VIT B1 BLD-MCNC: 108 NMOL/L (ref 70–180)
VIT B6 SERPL-MCNC: 55.5 NMOL/L (ref 20–125)

## 2021-03-21 LAB
ALBUMIN SERPL ELPH-MCNC: 4.33 G/DL (ref 3.75–5.01)
ALPHA1 GLOB SERPL ELPH-MCNC: 0.28 G/DL (ref 0.19–0.46)
ALPHA2 GLOB SERPL ELPH-MCNC: 0.71 G/DL (ref 0.48–1.05)
B-GLOBULIN SERPL ELPH-MCNC: 0.77 G/DL (ref 0.48–1.1)
COLLECT DURATION TIME SPEC: NORMAL HRS
CREAT 24H UR-MCNC: 148 MG/DL
CREAT 24H UR-MRATE: NORMAL MG/D (ref 500–1400)
GAMMA GLOB SERPL ELPH-MCNC: 0.71 G/DL (ref 0.62–1.51)
INTERPRETATION SERPL IFE-IMP: NORMAL
METHYLMALONATE UR-SCNC: 4.56 UMOL/L
METHYLMALONATE/CREAT UR-SRTO: 0.35 MMOL/MOL CRT (ref 0–3.6)
MONOCLON BAND OBS SERPL: NORMAL
PATHOLOGY STUDY: NORMAL
PROT SERPL-MCNC: 6.8 G/DL (ref 6.3–8.2)
REF LAB TEST RESULTS: NORMAL
TOTAL VOLUME 1105: NORMAL ML

## 2021-03-22 ENCOUNTER — HOSPITAL ENCOUNTER (OUTPATIENT)
Dept: RADIOLOGY | Facility: MEDICAL CENTER | Age: 66
End: 2021-03-22
Attending: STUDENT IN AN ORGANIZED HEALTH CARE EDUCATION/TRAINING PROGRAM
Payer: MEDICARE

## 2021-03-22 DIAGNOSIS — R20.0 NUMBNESS AND TINGLING OF BOTH FEET: ICD-10-CM

## 2021-03-22 DIAGNOSIS — M54.81 BILATERAL OCCIPITAL NEURALGIA: ICD-10-CM

## 2021-03-22 DIAGNOSIS — R51.9 CHRONIC INTRACTABLE HEADACHE, UNSPECIFIED HEADACHE TYPE: ICD-10-CM

## 2021-03-22 DIAGNOSIS — M25.50 PAIN IN JOINT, MULTIPLE SITES: ICD-10-CM

## 2021-03-22 DIAGNOSIS — M53.3 SI (SACROILIAC) PAIN: ICD-10-CM

## 2021-03-22 DIAGNOSIS — R50.9 RECENT UNEXPLAINED FEVER: ICD-10-CM

## 2021-03-22 DIAGNOSIS — R20.2 NUMBNESS AND TINGLING OF BOTH FEET: ICD-10-CM

## 2021-03-22 DIAGNOSIS — G89.29 CHRONIC INTRACTABLE HEADACHE, UNSPECIFIED HEADACHE TYPE: ICD-10-CM

## 2021-03-22 DIAGNOSIS — M51.36 OTHER INTERVERTEBRAL DISC DEGENERATION, LUMBAR REGION: ICD-10-CM

## 2021-03-22 LAB
ARSENIC 24H UR-MCNC: <10 UG/L (ref 0–34.9)
ARSENIC 24H UR-MRATE: ABNORMAL UG/D (ref 0–49.9)
ARSENIC/CREAT 24H UR: ABNORMAL UG/G CRT (ref 0–29.9)
CENTROMERE IGG TITR SER IF: 0 AU/ML (ref 0–40)
COLLECT DURATION TIME SPEC: 24 H
COPPER 24H UR-MRATE: ABNORMAL UG/D (ref 3–45)
COPPER ?TM UR-MCNC: <1 UG/DL
COPPER/CREAT 24H UR: ABNORMAL UG/G CRT (ref 10–45)
CREAT 24H UR-MCNC: 22 MG/DL
CREAT 24H UR-MRATE: 440 MG/D (ref 500–1400)
ENA JO1 AB TITR SER: 0 AU/ML (ref 0–40)
ENA SCL70 IGG SER QL: 0 AU/ML (ref 0–40)
ENA SM IGG SER-ACNC: 0 AU/ML (ref 0–40)
ENA SS-B IGG SER IA-ACNC: 1 AU/ML (ref 0–40)
LEAD 24H UR-MCNC: <5 UG/L (ref 0–5)
LEAD 24H UR-MRATE: ABNORMAL UG/D (ref 0–8.1)
LEAD/CREAT 24H UR: ABNORMAL UG/G CRT (ref 0–5)
MERCURY 24H UR-MCNC: <2.5 UG/L (ref 0–5)
MERCURY 24H UR-MRATE: ABNORMAL UG/D (ref 0–20)
MERCURY/CREAT 24H UR: ABNORMAL UG/G CRT (ref 0–20)
RIBOSOMAL P AB SER-ACNC: 1 AU/ML (ref 0–40)
SSA52 R0ENA AB IGG Q0420: 1 AU/ML (ref 0–40)
SSA60 R0ENA AB IGG Q0419: 0 AU/ML (ref 0–40)
TOTAL VOLUME 1105: 2000 ML
U1 SNRNP IGG SER QL: NORMAL
ZINC 24H UR-MCNC: <10 UG/DL (ref 15–120)
ZINC 24H UR-MRATE: ABNORMAL UG/D (ref 150–1200)
ZINC/CREAT 24H UR: ABNORMAL UG/G CRT (ref 110–750)

## 2021-03-22 PROCEDURE — 700117 HCHG RX CONTRAST REV CODE 255: Performed by: STUDENT IN AN ORGANIZED HEALTH CARE EDUCATION/TRAINING PROGRAM

## 2021-03-22 PROCEDURE — A9576 INJ PROHANCE MULTIPACK: HCPCS | Performed by: STUDENT IN AN ORGANIZED HEALTH CARE EDUCATION/TRAINING PROGRAM

## 2021-03-22 PROCEDURE — 70553 MRI BRAIN STEM W/O & W/DYE: CPT

## 2021-03-22 PROCEDURE — 72158 MRI LUMBAR SPINE W/O & W/DYE: CPT | Mod: ME

## 2021-03-22 RX ADMIN — GADOTERIDOL 10 ML: 279.3 INJECTION, SOLUTION INTRAVENOUS at 15:21

## 2021-03-24 LAB
CELIAS HLA INTERP Q0449: ABNORMAL
HLA CELIAC SPEC Q0445: ABNORMAL
HLA-DQA1*05:01 QL: NEGATIVE
HLA-DQB1*02:01 QL: POSITIVE
HLA-DQB1*03:02 QL: NEGATIVE

## 2021-03-30 ENCOUNTER — HOSPITAL ENCOUNTER (OUTPATIENT)
Facility: MEDICAL CENTER | Age: 66
End: 2021-03-30
Attending: STUDENT IN AN ORGANIZED HEALTH CARE EDUCATION/TRAINING PROGRAM
Payer: MEDICARE

## 2021-03-30 PROCEDURE — 87449 NOS EACH ORGANISM AG IA: CPT

## 2021-04-01 ENCOUNTER — TELEPHONE (OUTPATIENT)
Dept: NEUROLOGY | Facility: MEDICAL CENTER | Age: 66
End: 2021-04-01

## 2021-04-02 LAB — TEST NAME 95000: NORMAL

## 2021-04-10 ENCOUNTER — IMMUNIZATION (OUTPATIENT)
Dept: FAMILY PLANNING/WOMEN'S HEALTH CLINIC | Facility: IMMUNIZATION CENTER | Age: 66
End: 2021-04-10
Attending: INTERNAL MEDICINE
Payer: MEDICARE

## 2021-04-10 DIAGNOSIS — Z23 ENCOUNTER FOR VACCINATION: Primary | ICD-10-CM

## 2021-04-10 PROCEDURE — 0012A MODERNA SARS-COV-2 VACCINE: CPT

## 2021-04-10 PROCEDURE — 91301 MODERNA SARS-COV-2 VACCINE: CPT

## 2021-04-13 ENCOUNTER — OFFICE VISIT (OUTPATIENT)
Dept: NEUROLOGY | Facility: MEDICAL CENTER | Age: 66
End: 2021-04-13
Attending: STUDENT IN AN ORGANIZED HEALTH CARE EDUCATION/TRAINING PROGRAM
Payer: MEDICARE

## 2021-04-13 VITALS
SYSTOLIC BLOOD PRESSURE: 128 MMHG | BODY MASS INDEX: 18.4 KG/M2 | TEMPERATURE: 98.1 F | OXYGEN SATURATION: 97 % | DIASTOLIC BLOOD PRESSURE: 60 MMHG | HEART RATE: 105 BPM | HEIGHT: 65 IN | WEIGHT: 110.45 LBS

## 2021-04-13 DIAGNOSIS — G43.009 MIGRAINE WITHOUT AURA AND WITHOUT STATUS MIGRAINOSUS, NOT INTRACTABLE: ICD-10-CM

## 2021-04-13 DIAGNOSIS — M54.81 BILATERAL OCCIPITAL NEURALGIA: ICD-10-CM

## 2021-04-13 PROCEDURE — 700101 HCHG RX REV CODE 250: Performed by: STUDENT IN AN ORGANIZED HEALTH CARE EDUCATION/TRAINING PROGRAM

## 2021-04-13 PROCEDURE — 700111 HCHG RX REV CODE 636 W/ 250 OVERRIDE (IP): Performed by: STUDENT IN AN ORGANIZED HEALTH CARE EDUCATION/TRAINING PROGRAM

## 2021-04-13 PROCEDURE — 64405 NJX AA&/STRD GR OCPL NRV: CPT | Mod: 50 | Performed by: STUDENT IN AN ORGANIZED HEALTH CARE EDUCATION/TRAINING PROGRAM

## 2021-04-13 PROCEDURE — 64405 NJX AA&/STRD GR OCPL NRV: CPT | Performed by: STUDENT IN AN ORGANIZED HEALTH CARE EDUCATION/TRAINING PROGRAM

## 2021-04-13 RX ORDER — BUPIVACAINE HYDROCHLORIDE 2.5 MG/ML
2 INJECTION, SOLUTION EPIDURAL; INFILTRATION; INTRACAUDAL ONCE
Status: COMPLETED | OUTPATIENT
Start: 2021-04-13 | End: 2021-04-13

## 2021-04-13 RX ADMIN — LIDOCAINE HYDROCHLORIDE 2 ML: 10 INJECTION, SOLUTION EPIDURAL; INFILTRATION; INTRACAUDAL; PERINEURAL at 17:07

## 2021-04-13 RX ADMIN — BUPIVACAINE HYDROCHLORIDE 2 ML: 2.5 INJECTION, SOLUTION EPIDURAL; INFILTRATION; INTRACAUDAL; PERINEURAL at 17:05

## 2021-04-13 ASSESSMENT — FIBROSIS 4 INDEX: FIB4 SCORE: 1.58

## 2021-04-13 NOTE — PATIENT INSTRUCTIONS
.Neurology Clinical Patient Instructions          If imaging, procedure(s), or referrals were placed for you:  Contact [facility] scheduling if you have not been contacted about an scheduling an appointment. Call [phone]    Outpatient [facility] Imaging Sites.  • [address]  Mon-Fri 8am-5pm   • [address] (Breast Center) Mon-Fri 7am-4pm    • [address]  Mon-Fri 8am-5pm  • [facility] 7am-6:30pm  • [address] Mon-Fri 8am-7pm  Sat 9am-6pm   • [address]  Mon-Fri 8am-7pm and Sat/Sun 9am-5pm  • [address]  Mon-Fri 8am-5pm  • [address] (PET/CT)  Mon-Fri 8:30am-4:30pm     If labs were ordered for you:  • To Schedule an appointment for lab services, please call [phone] or visit [email or web address].  • [facility] Lab Services Convenient Locations:    [place]: • [address] (Ground Floor)  • [address] (Admitting Entrance)  • [address]  • [address]  • [address] [facility], [address]  • [address] [facility]  • [address]: • [address]  • [address]       [place] • [address]  • [address]     [facility]     [address] [address]     Laboratory Hours: 6:30am - 6pm  Monday - Friday,   7am - 2pm Saturday    [facility]      [address] [place] [address]      Laboratory Hours:  7:30am - 4pm  Monday - Friday,  9am - 3pm Saturday    [facility]     [address]    [address] [address]      Laboratory Hours:  7:00am - 5:30pm  Monday - Friday    [facility]      [address] [address]       Laboratory Hours:  7:30am - 4:30pm  Monday - Friday    [facility]       [address] [place] [id number]       Laboratory Hours:  8am - 5pm  Monday - Friday    [facility]        [address] [address]       Laboratory hours:  7:30am - 4pm  Monday - Friday    GENERAL REMINDERS:  · Request refills 1 week in advance to ensure you do not run out of medications  · All diagnostic study results will be reviewed at your next visit, UNLESS there are urgent results that need to be acted on sooner.  · Please remember that it is the your responsibility to check with your Insurance for benefit coverage for visit / visits.  · 24 hours notice is required for all appointment changes or cancellation.  · Please arrive 20 min. before your appointment time  · Please note that because Dr. Downs has high daily clinic volume, he is unable to accommodate late arrivals. If you are more than 15 minutes late for the scheduled appointment you will be asked to reschedule  · Please bring the following with you:  1) Picture ID  2) Insurance card  3) An updated list of ALL your medications and their dosages (prescribed medications, over the counter medications, and all supplements), as well as allergies with you at all times  4) A list of questions, concerns, comments that you wish to discuss with Dr. Yareli Downs MD  General Neurologist and Epileptologist  Department of Neurology  Instructor of Clinical Neurology [facility] of [facility].

## 2021-04-13 NOTE — PROGRESS NOTES
GENERAL NEUROLOGY INITIAL ENCOUNTER  REFERRING PROVIDER:  Erlin Colón M.D.  [address] [place] [id number]      CHIEF COMPLAINT(S): headaches and numbness and dysesthesias    History of present illness:  Carlee Zavala 65 y.o. female presents today for subacute onset of headaches and progressive numbness.  Symptoms started several months ago.  She noticed some painful dysesthesias in her hands and feet in between periods of numbness particularly in her bilateral feet.  Initially episodic but becoming more chronic now.  It is affecting her gait to the point where she has to look down at her feet and at times wall skirt to maintain balance.  Her disequilibrium feels off.  Left hand is a little bit worse in terms of numbness and dysesthesias in the distal hand and fingers.  Also noticed some weakness there as well.  She has been dropping things in her left hand more frequently.   Left hand tendoninits.  Couple weeks ago there was some report of fever.  In the past couple months also she has had some chronic watery diarrhea.  She also has joint and tendon pain in her hands and ankles.  She has some radicular lumbar pain radiating down her posterior lateral legs left more than right.  She also has bilateral sacroiliac pain.  There is some redness around her joints as well.  She has some central cervical neck pain as well.  Denies any recent trauma.  Sometimes the pain will be sporadically sharp particularly in her left hand.  She was told that she had gout.    Around the same time of these sensory complaints she developed a change from her migraine headaches that she has been having for years.  Specifically they more frequent and intense happening a couple days in a week.  They typically start in the back of her head and neck and radiate anteriorly to the front of her head as well as the temple region.  This moderate to severe in intensity.  Sharp as well as throbbing quality of pain.  Typically they  last 2 hours.  Think stress is a trigger.  Denies any changes in her vision.  Has tried sumatriptan without major improvement as well.      Sharp pain, in left wrist    Balance is off as well.    Also reports episodic dizziness especially when standing recently.    Patient's PMH, PSH, FH, and SH were reviewed. No Autoimmunity in her family that she is aware.    Medications and allergies were reviewed.         Past medical history:   Past Medical History:   Diagnosis Date   • Anemia    • Blood transfusion without reported diagnosis    • Urinary bladder disorder     hx of intersitial cystitis x 20 years       Past surgical history:   Past Surgical History:   Procedure Laterality Date   • PB LAP, SURG PROCTOPEXY  1/21/2020    Procedure: RECTOPEXY, ROBOT-ASSISTED, LAPAROSCOPIC, USING DA VIRGINIE XI;  Surgeon: Daniel Palacio M.D.;  Location: SURGERY [facility];  Service: General   • LOW ANTERIOR RESECTION ROBOTIC XI  1/21/2020    Procedure: RESECTION, LOW ANTERIOR, ROBOT-ASSISTED, LAPAROSCOPIC, USING DA VIRGINIE XI;  Surgeon: Daniel Palacio M.D.;  Location: SURGERY [facility];  Service: General       Family history:   Family History   Problem Relation Age of Onset   • Hypertension Mother    • Hypertension Father        Social history:   Social History     Socioeconomic History   • Marital status:      Spouse name: Not on file   • Number of children: Not on file   • Years of education: Not on file   • Highest education level: Not on file   Occupational History   • Not on file   Tobacco Use   • Smoking status: Never Smoker   • Smokeless tobacco: Never Used   Substance and Sexual Activity   • Alcohol use: Not Currently   • Drug use: Not Currently   • Sexual activity: Not Currently   Other Topics Concern   • Not on file   Social History Narrative   • Not on file     Social Determinants of Health     Financial Resource Strain:    • Difficulty of Paying Living Expenses:    Food Insecurity: No Food Insecurity    • Worried About Running Out of Food in the Last Year: Never true   • Ran Out of Food in the Last Year: Never true   Transportation Needs: No Transportation Needs   • Lack of Transportation (Medical): No   • Lack of Transportation (Non-Medical): No   Physical Activity:    • Days of Exercise per Week:    • Minutes of Exercise per Session:    Stress:    • Feeling of Stress :    Social Connections:    • Frequency of Communication with Friends and Family:    • Frequency of Social Gatherings with Friends and Family:    • Attends Orthodoxy Services:    • Active Member of Clubs or Organizations:    • Attends Club or Organization Meetings:    • Marital Status:    Intimate Partner Violence:    • Fear of Current or Ex-Partner:    • Emotionally Abused:    • Physically Abused:    • Sexually Abused:        Current medications:   Current Outpatient Medications   Medication   • olopatadine (PATANOL) 0.1 % ophthalmic solution   • ALPRAZolam (XANAX) 0.5 MG Tab   • tizanidine (ZANAFLEX) 4 MG Tab   • sumatriptan (IMITREX) 100 MG tablet   • gabapentin (NEURONTIN) 300 MG Cap   • Doxylamine Succinate, Sleep, (SLEEP AID PO)     No current facility-administered medications for this visit.       Medication Allergy:  Allergies   Allergen Reactions   • Sulfa Drugs      hives   • Fosamax          Review of systems:   Pertinent positives and negatives are as outlined above      Physical examination:   Vitals:    04/13/21 0955   BP: 128/60   Pulse: (!) 105   Temp: 36.7 °C (98.1 °F)   SpO2: 97%       General: Patient in no acute distress, pleasant and cooperative.  HEENT: Normocephalic, no signs of acute trauma.  Bilateral occipital tenderness  Neck: appears supple, here is normal range of motion. Mild SI and upper C spine midline tenderness with no stepoff.   Chest: clear to auscultation. Symmetrical chest rise with inhalation. No cough.   CV: RRR, no murmurs.   Skin: no signs of acute rashes or trauma.   Musculoskeletal: tenderness of the  ulnar aspect of her left wrist, with mild erythema and decreased ROM.  Psychiatric: pertinent positives as discussed above    NEUROLOGICAL EXAM:   Mental status:  orientation: Awake, alert and oriented to self, month, year, situation.  Attention: Intact  Speech and language: speech is clear and fluent. The patient is able to name, repeat and comprehend. No word substitions or paraphasic errors  Memory: There is intact recollection of recent and remote events.   Cranial nerve exam:   I: smell Not tested   II: visual acuity  Not Tested   II: Fundoscpic No papilledema and normal optic disc bilaterally   II: visual fields Full to confrontation  Visual neglect: absent   II: pupils Equal, round, reactive to light   III,VII: ptosis None   III,IV,VI: extraocular muscles  EOMI   V: mastication Normal   V: facial light touch sensation  Normal   V,VII: corneal reflex  Not tested   VII: facial muscle function - upper  Normal   VII: facial muscle function - lower Normal   VIII: hearing Not tested   IX: soft palate elevation  Normal   IX,X: gag reflex Not tested   XI: trapezius strength  NT   XI: sternocleidomastoid strength NT   XI: neck flexion strength  NT   XII: tongue  Protrudes Midline     Motor exam:   • Mild weakness in her left , partially pain limited weakness although there is some mild thenar atrophy noted  • Tone is normal.  • No abnormal movements were seen on exam.   • No muscle fasciculation  Sensory exam reveals normal sense of light touch, proprioception, vibration and pinprick in all extremities. Cortical sensory testing: Normal. Test of neglect: none present to simultaneous stimulation with touch  Deep tendon reflexes:  2+ AJ b/l. 2+ left patella. 3+ right patella. + Right cross adductor. Brisk reflexes in bilateral biceps and BR R>L. diminished left tricep reflex. throughout. Plantar responses  Coordination: shows a normal finger-nose-finger. Normal rapidly alternating movements. Heel-knee-shin movements  smooth and coordinated bilaterally.   Gait:   • Somewhat wide based gait, cautiously looking at her feet the whole time as well as wall surfing  • Romberg exam postivie        ANCILLARY DATA REVIEWED:       Lab Data Review:  Reviewed    Records reviewed:   Reviewed    Imaging:   Reviewed    EEG:  NA      ASSESSMENT, PLAN, EDUCATION/COUNSELIN-year-old female with progressive sensory loss and dysesthesias in her distal extremities, subacute onset of a new headache phenotype in the past couple months.  This is in the setting of multifocal joint pain with subtle inflammation as noted above.  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  She also has symptoms suggestive of a recurrent radiculopathy particularly in the lumbosacral plexus which may be causing a double hit phenomenon exacerbating her pain symptoms as well as her gait impairment.  Notable on her exam is the fact that she has either preserved or brisk reflexes particularly on the right which suggest more of an upper motor neuron pathology so this raises the question of a possible myelopathy although there was no hard exam findings such as a sensory level on my exam.  Regardless with her progressive symptoms and upper and lower back pain with radicular symptoms I think it warrants a broad work-up that includes infectious etiologies, endocrinological, autoimmune and inflammatory, granulomatous, compressive, toxic/metabolic, vitamin deficiency.  We had an extensive discussion about this and that it will take some time to gather some data about her neuropathy.  Common things being common is possible that much of her symptoms are in large part due to a metabolic diabetic sensorimotor neuropathy but some red flag symptoms in her history  such as recent fever, repetitive her symptoms, worsening and change in her headache warrant a broader work-up.  We discussed doing a nerve conduction study with EMG to evaluate both her nerves and muscles.  We will also order some films of her C-spine and hand to evaluate for inflammatory or noninflammatory arthritic changes.  We will also order a lumbar spine MRI with and without contrast.  Depending on what her initial work-up shows an if her symptoms should continue to progress we may have to do a lumbar punctu      Regarding her headaches patient had exquisite bilateral occipital tenderness suggestive of occipital neuralgia.  I will plan to do a occipital nerve block in a couple weeks to see if this helps with her pain/headache symptoms.    Finally given her recent fever couple weeks ago in the setting of a change in her headache along with new neurological and progressive symptoms this does warrant a brain MRI with and without contrast to rule out structural/inflammatory/infectious causes of her symptoms which would not be adequately visualized with the CT head that she obtain previously.    Patient/family agree with plan, as outlined:    {No diagnosis found. (Refresh or delete this SmartLink)}     No orders of the defined types were placed in this encounter.           FOLLOW-UP:   6 weeks            BILLING DOCUMENTATION:       Counseling:  I spent a total of 63 minutes of face-to-face time in this visit. Over 50% of the time of the visit today was spent on counseling and or coordination of care wtih the patient and/or family, as above in assessment in plan.       Obed Downs MD  Epilepsy and General Neurology  Department of Neurology  Clinical  of Neurology [facility].

## 2021-04-13 NOTE — PROCEDURES
Nerve Block    Date/Time: 4/13/2021 4:39 PM  Performed by: Obed Downs M.D.  Authorized by: Obed Downs M.D.   Indications: pain relief  Body area: head  Nerve: greater occipital  Laterality: bilateral.  Patient position: sitting  Needle size: 24 G  Location technique: anatomical landmarks  Local anesthetic: 1 part 1% lidocaine plus 1 part 0.25 bupivicaine   Anesthetic total: 4 mL  Outcome: pain unchanged  Patient tolerance: patient tolerated the procedure well with no immediate complications  Comments: Additional indications: bilateral occipital neuralgia          Obed Downs M.D.         [phone]

## 2021-04-14 ENCOUNTER — APPOINTMENT (RX ONLY)
Dept: URBAN - METROPOLITAN AREA CLINIC 22 | Facility: CLINIC | Age: 66
Setting detail: DERMATOLOGY
End: 2021-04-14

## 2021-04-14 DIAGNOSIS — L98.8 OTHER SPECIFIED DISORDERS OF THE SKIN AND SUBCUTANEOUS TISSUE: ICD-10-CM

## 2021-04-14 DIAGNOSIS — D18.0 HEMANGIOMA: ICD-10-CM

## 2021-04-14 DIAGNOSIS — L82.0 INFLAMED SEBORRHEIC KERATOSIS: ICD-10-CM

## 2021-04-14 PROBLEM — D18.01 HEMANGIOMA OF SKIN AND SUBCUTANEOUS TISSUE: Status: ACTIVE | Noted: 2021-04-14

## 2021-04-14 PROCEDURE — ? COUNSELING

## 2021-04-14 PROCEDURE — ? LIQUID NITROGEN

## 2021-04-14 PROCEDURE — 99202 OFFICE O/P NEW SF 15 MIN: CPT | Mod: 25

## 2021-04-14 PROCEDURE — ? ADDITIONAL NOTES

## 2021-04-14 PROCEDURE — ? PHOTO-DOCUMENTATION

## 2021-04-14 PROCEDURE — 17110 DESTRUCTION B9 LES UP TO 14: CPT

## 2021-04-14 ASSESSMENT — LOCATION ZONE DERM
LOCATION ZONE: SCALP
LOCATION ZONE: LIP
LOCATION ZONE: NECK

## 2021-04-14 ASSESSMENT — LOCATION DETAILED DESCRIPTION DERM
LOCATION DETAILED: LEFT INFERIOR VERMILION LIP
LOCATION DETAILED: LEFT INFERIOR ANTERIOR NECK
LOCATION DETAILED: RIGHT CENTRAL PARIETAL SCALP

## 2021-04-14 ASSESSMENT — LOCATION SIMPLE DESCRIPTION DERM
LOCATION SIMPLE: SCALP
LOCATION SIMPLE: LEFT LIP
LOCATION SIMPLE: LEFT ANTERIOR NECK

## 2021-04-14 NOTE — HPI: SKIN LESION
Is This A New Presentation, Or A Follow-Up?: Skin Lesion
What Type Of Note Output Would You Prefer (Optional)?: Standard Output
How Severe Is Your Skin Lesion?: moderate
Has Your Skin Lesion Been Treated?: not been treated
Additional History: Every time she gets dental work it bleeds.
Is This A New Presentation, Or A Follow-Up?: Growths

## 2021-04-14 NOTE — PROCEDURE: LIQUID NITROGEN
Consent: The patient's consent was obtained including but not limited to risks of crusting, scabbing, blistering, scarring, darker or lighter pigmentary change, recurrence, incomplete removal and infection.
Include Z78.9 (Other Specified Conditions Influencing Health Status) As An Associated Diagnosis?: Yes
Post-Care Instructions: I reviewed with the patient in detail post-care instructions. Patient is to wear sunprotection, and avoid picking at any of the treated lesions. Pt may apply Vaseline to crusted or scabbing areas.
Medical Necessity Clause: This procedure was medically necessary because the lesions that were treated were:
Render Post-Care Instructions In Note?: no
Medical Necessity Information: It is in your best interest to select a reason for this procedure from the list below. All of these items fulfill various CMS LCD requirements except the new and changing color options.
Detail Level: Detailed

## 2021-04-23 ENCOUNTER — APPOINTMENT (RX ONLY)
Dept: URBAN - METROPOLITAN AREA CLINIC 20 | Facility: CLINIC | Age: 66
Setting detail: DERMATOLOGY
End: 2021-04-23

## 2021-04-23 DIAGNOSIS — Z41.9 ENCOUNTER FOR PROCEDURE FOR PURPOSES OTHER THAN REMEDYING HEALTH STATE, UNSPECIFIED: ICD-10-CM

## 2021-04-23 PROCEDURE — ? PULSED-DYE LASER

## 2021-04-23 ASSESSMENT — LOCATION SIMPLE DESCRIPTION DERM: LOCATION SIMPLE: RIGHT LIP

## 2021-04-23 ASSESSMENT — LOCATION DETAILED DESCRIPTION DERM: LOCATION DETAILED: RIGHT INFERIOR VERMILION LIP

## 2021-04-23 ASSESSMENT — LOCATION ZONE DERM: LOCATION ZONE: LIP

## 2021-04-23 NOTE — PROCEDURE: PULSED-DYE LASER
Fluence In J/Cm2 (Optional): 15.0
Cryogen Time (Ms): 30
Delay Time (Ms): 20
Spot Size: 7 mm
Consent: Written consent obtained, risks reviewed including but not limited to crusting, scabbing, blistering, scarring, darker or lighter pigmentary change, incidental hair removal, bruising, and/or incomplete removal.
Post-Care Instructions: I reviewed with the patient in detail post-care instructions. Patient should stay away from the sun and wear sun protection until treated areas are fully healed.
Laser Type: Vbeam 595nm
Pulse Duration: 1.5 ms
Spot Size: 10x3 mm
Pulse Duration: 10 ms
Location (Required For Details To Render In Note But Body Touch Will Also Count For First Location): lower cutaneous lip
Fluence In J/Cm2 (Optional): 14.00
Pulse Duration: 30 ms
Post-Procedure Care: Vaseline and ice applied. Post care reviewed with patient.
Detail Level: Simple
Price (Use Numbers Only, No Special Characters Or $): 125

## 2021-05-19 ENCOUNTER — OFFICE VISIT (OUTPATIENT)
Dept: NEUROLOGY | Facility: MEDICAL CENTER | Age: 66
End: 2021-05-19
Attending: STUDENT IN AN ORGANIZED HEALTH CARE EDUCATION/TRAINING PROGRAM
Payer: MEDICARE

## 2021-05-19 VITALS
HEART RATE: 87 BPM | HEIGHT: 65 IN | SYSTOLIC BLOOD PRESSURE: 116 MMHG | TEMPERATURE: 99.3 F | DIASTOLIC BLOOD PRESSURE: 65 MMHG | BODY MASS INDEX: 18.64 KG/M2 | OXYGEN SATURATION: 97 % | WEIGHT: 111.9 LBS | RESPIRATION RATE: 16 BRPM

## 2021-05-19 DIAGNOSIS — Z00.00 HEALTHCARE MAINTENANCE: ICD-10-CM

## 2021-05-19 DIAGNOSIS — G43.009 MIGRAINE WITHOUT AURA AND WITHOUT STATUS MIGRAINOSUS, NOT INTRACTABLE: Chronic | ICD-10-CM

## 2021-05-19 DIAGNOSIS — M54.81 BILATERAL OCCIPITAL NEURALGIA: ICD-10-CM

## 2021-05-19 PROCEDURE — 700101 HCHG RX REV CODE 250: Performed by: STUDENT IN AN ORGANIZED HEALTH CARE EDUCATION/TRAINING PROGRAM

## 2021-05-19 PROCEDURE — 64405 NJX AA&/STRD GR OCPL NRV: CPT | Mod: 50 | Performed by: STUDENT IN AN ORGANIZED HEALTH CARE EDUCATION/TRAINING PROGRAM

## 2021-05-19 PROCEDURE — 64405 NJX AA&/STRD GR OCPL NRV: CPT | Performed by: STUDENT IN AN ORGANIZED HEALTH CARE EDUCATION/TRAINING PROGRAM

## 2021-05-19 RX ORDER — BUPIVACAINE HYDROCHLORIDE 5 MG/ML
2 INJECTION, SOLUTION EPIDURAL; INTRACAUDAL ONCE
Status: COMPLETED | OUTPATIENT
Start: 2021-05-19 | End: 2021-05-19

## 2021-05-19 RX ADMIN — LIDOCAINE HYDROCHLORIDE 2 ML: 10 INJECTION, SOLUTION EPIDURAL; INFILTRATION; INTRACAUDAL; PERINEURAL at 13:26

## 2021-05-19 RX ADMIN — BUPIVACAINE HYDROCHLORIDE 2 ML: 5 INJECTION, SOLUTION EPIDURAL; INTRACAUDAL; PERINEURAL at 13:25

## 2021-05-19 ASSESSMENT — FIBROSIS 4 INDEX: FIB4 SCORE: 1.58

## 2021-05-19 NOTE — PATIENT INSTRUCTIONS
.Neurology Clinical Patient Instructions          If imaging, procedure(s), or referrals were placed for you:  Contact [facility] scheduling if you have not been contacted about an scheduling an appointment. Call [phone]    Outpatient [facility] Imaging Sites.  • [address]  Mon-Fri 8am-5pm   • [address] (Breast Center) Mon-Fri 7am-4pm    • [address]  Mon-Fri 8am-5pm  • [facility] 7am-6:30pm  • [address] Mon-Fri 8am-7pm  Sat 9am-6pm   • [address]  Mon-Fri 8am-7pm and Sat/Sun 9am-5pm  • [address]  Mon-Fri 8am-5pm  • [address] (PET/CT)  Mon-Fri 8:30am-4:30pm     If labs were ordered for you:  • To Schedule an appointment for lab services, please call [phone] or visit [email or web address].  • [facility] Lab Services Convenient Locations:    [place]: • [address] (Ground Floor)  • [address] (Admitting Entrance)  • [address], [address]  • [address]  • [address] [facility], [address]  • [address] [facility]  • [address]: • [address]  • [address]       [place]: • [address]  • [address]     [facility]     [address] [address]     Laboratory Hours: 6:30am - 6pm  Monday - Friday,   7am - 2pm Saturday    [facility]      [address] [place] [address]      Laboratory Hours:  7:30am - 4pm  Monday - Friday,  9am - 3pm Saturday    [facility]     [address]    [address] [place] [address]      Laboratory Hours:  7:00am - 5:30pm  Monday - Friday    [facility]      [address] [address]       Laboratory Hours:  7:30am - 4:30pm  Monday - Friday    [facility]       [address] [place] [id number]       Laboratory Hours:  8am - 5pm  Monday - Friday    [facility]        [address] [place] [address]       Laboratory hours:  7:30am - 4pm  Monday - Friday    GENERAL REMINDERS:  · Request refills 1 week in advance to ensure you do not run out of medications  · All diagnostic study results will be reviewed at your next visit, UNLESS there are urgent results that need to be acted on sooner.  · Please remember that it is the your responsibility to check with your Insurance for benefit coverage for visit / visits.  · 24 hours notice is required for all appointment changes or cancellation.  · Please arrive 20 min. before your appointment time  · Please note that because Dr. Downs has high daily clinic volume, he is unable to accommodate late arrivals. If you are more than 15 minutes late for the scheduled appointment you will be asked to reschedule  · Please bring the following with you:  1) Picture ID  2) Insurance card  3) An updated list of ALL your medications and their dosages (prescribed medications, over the counter medications, and all supplements), as well as allergies with you at all times  4) A list of questions, concerns, comments that you wish to discuss with Dr. Yareli Downs MD  General Neurologist and Epileptologist  Department of Neurology  Instructor of Clinical Neurology [facility] of Medicine.

## 2021-05-19 NOTE — PROCEDURES
Bilteral Occipital nerve block    Date/Time: 5/19/2021 11:47 AM  Performed by: Obed Downs M.D.  Authorized by: Obed Downs M.D.   Preparation: Patient was prepped and draped in the usual sterile fashion.  Local anesthesia used: yes  Anesthesia: nerve block    Anesthesia:  Local anesthesia used: yes  Local Anesthetic: lidocaine 1% without epinephrine and bupivacaine 0.5% without epinephrine    Sedation:  Patient sedated: no    Patient tolerance: patient tolerated the procedure well with no immediate complications  Comments: Indication: for treatment of occipital neuralgia bilaterally and migraine headaches    Description of the procedure: Patient was sitting up-right in the chair. The posterior head at the level of the nuchal ridge was extensively cleaned with alcohol wipes on both sides. After cleaning, greater occipital nerve was identified on each side by marking a point that is 2/3rds the distance from the mastoid process to the occipital notch. 2 cc of 0.5 % Bupivicaine and 2 cc of 1% lidocaine were mixed in equal fashion in a syringe. Then using a 23 gauge needle, 2 cc of  the 1:1 lidocaine/bupivacaine mixture was injected into the right greater occipital nerve, then the left for a total of 4 cc. There were no complications during the procedure and the patient tolerated the procedure well.          Obed Downs M.D.

## 2021-06-01 ENCOUNTER — APPOINTMENT (RX ONLY)
Dept: URBAN - METROPOLITAN AREA CLINIC 20 | Facility: CLINIC | Age: 66
Setting detail: DERMATOLOGY
End: 2021-06-01

## 2021-06-01 DIAGNOSIS — Z41.9 ENCOUNTER FOR PROCEDURE FOR PURPOSES OTHER THAN REMEDYING HEALTH STATE, UNSPECIFIED: ICD-10-CM

## 2021-06-01 PROCEDURE — ? SCITON BBL

## 2021-06-01 PROCEDURE — ? PULSED-DYE LASER

## 2021-06-01 ASSESSMENT — LOCATION SIMPLE DESCRIPTION DERM
LOCATION SIMPLE: RIGHT CHEEK
LOCATION SIMPLE: LEFT LIP
LOCATION SIMPLE: RIGHT FOREHEAD
LOCATION SIMPLE: LEFT CHEEK

## 2021-06-01 ASSESSMENT — LOCATION DETAILED DESCRIPTION DERM
LOCATION DETAILED: LEFT INFERIOR VERMILION LIP
LOCATION DETAILED: LEFT INFERIOR MEDIAL MALAR CHEEK
LOCATION DETAILED: RIGHT INFERIOR CENTRAL MALAR CHEEK
LOCATION DETAILED: RIGHT INFERIOR MEDIAL FOREHEAD

## 2021-06-01 ASSESSMENT — LOCATION ZONE DERM
LOCATION ZONE: LIP
LOCATION ZONE: FACE

## 2021-06-01 NOTE — PROCEDURE: SCITON BBL
Post Procedure Text: The patient tolerated the procedure well. Ice-chilled washclothes were applied to the treatment area for comfort. Post care was reviewed with the patient.
Fluence (J/Cm2): 15
Pulse Duration: 10
Additional Comments (Optional): same setting using 15x15
Passes: 1
Pulse Duration: 20
Spot Size: Finesse Adapter Size: 15 x 45 mm (No Finesse Adapter)
Detail Level: Zone
Pulse Duration Units: milliseconds
Cooling ?: Yes
Indication Override (Will Not Show Above If Text Entered): Vascular
Price (Use Numbers Only, No Special Characters Or $): 450
Spot Size: Finesse Adapter Size: 7 mm round
Consent: Written consent obtained, risks reviewed including but not limited to crusting, scabbing, blistering, scarring, darker or lighter pigmentary change, bruising, and/or incomplete response.
Location Override (Will Not Show Above If Text Entered): Face Spot Treat
Post-Care Instructions: I reviewed with the patient in detail post-care instructions. Patient should stay away from the sun and wear sun protection until treated areas are fully healed.
Fluence (J/Cm2): 21
Cooling (In C): 25
Hide Repetition Rate?: No
Fluence (J/Cm2): 8
Anesthesia Volume In Cc: 0
Preprocedure Text: The treatment areas were thoroughly cleaned. Any exposed at risk hair that was not to be treated was covered in white paper tape. Clear ultrasound gel was applied to the treatment area. The area was treated with no immediate stacking of pulses.
Fluence (J/Cm2): 13
Passes: 2
Location Override (Will Not Show Above If Text Entered): Full Face Spot Treat

## 2021-06-01 NOTE — PROCEDURE: PULSED-DYE LASER
Delay Time (Ms): 20
Pulse Duration: 30 ms
Cryogen Time (Ms): 30
Pulse Duration: 10 ms
Pulse Duration: 1.5 ms
Spot Size: 7 mm
Laser Type: Vbeam 595nm
Fluence In J/Cm2 (Optional): 15.0
Post-Procedure Care: Vaseline and ice applied. Post care reviewed with patient.
Fluence In J/Cm2 (Optional): 7.50
Post-Care Instructions: I reviewed with the patient in detail post-care instructions. Patient should stay away from the sun and wear sun protection until treated areas are fully healed.
Consent: Written consent obtained, risks reviewed including but not limited to crusting, scabbing, blistering, scarring, darker or lighter pigmentary change, incidental hair removal, bruising, and/or incomplete removal.
Location (Required For Details To Render In Note But Body Touch Will Also Count For First Location): lower cutaneous lip
Detail Level: Simple
Spot Size: 10x3 mm
Price (Use Numbers Only, No Special Characters Or $): 0

## 2021-06-11 ENCOUNTER — OFFICE VISIT (OUTPATIENT)
Dept: NEUROLOGY | Facility: MEDICAL CENTER | Age: 66
End: 2021-06-11
Attending: STUDENT IN AN ORGANIZED HEALTH CARE EDUCATION/TRAINING PROGRAM
Payer: MEDICARE

## 2021-06-11 VITALS
SYSTOLIC BLOOD PRESSURE: 118 MMHG | DIASTOLIC BLOOD PRESSURE: 76 MMHG | WEIGHT: 112.43 LBS | OXYGEN SATURATION: 94 % | BODY MASS INDEX: 18.71 KG/M2 | HEART RATE: 81 BPM | RESPIRATION RATE: 12 BRPM | TEMPERATURE: 96.6 F

## 2021-06-11 DIAGNOSIS — R20.2 NUMBNESS AND TINGLING OF BOTH FEET: ICD-10-CM

## 2021-06-11 DIAGNOSIS — M54.81 BILATERAL OCCIPITAL NEURALGIA: ICD-10-CM

## 2021-06-11 DIAGNOSIS — M54.2 CERVICAL PAIN (NECK): ICD-10-CM

## 2021-06-11 DIAGNOSIS — Z12.31 ENCOUNTER FOR SCREENING MAMMOGRAM FOR MALIGNANT NEOPLASM OF BREAST: ICD-10-CM

## 2021-06-11 DIAGNOSIS — Z12.31 ENCOUNTER FOR SCREENING MAMMOGRAM FOR BREAST CANCER: ICD-10-CM

## 2021-06-11 DIAGNOSIS — M54.9 CHRONIC BACK PAIN, UNSPECIFIED BACK LOCATION, UNSPECIFIED BACK PAIN LATERALITY: ICD-10-CM

## 2021-06-11 DIAGNOSIS — R25.2 LEG CRAMPING: ICD-10-CM

## 2021-06-11 DIAGNOSIS — G89.29 CHRONIC BACK PAIN, UNSPECIFIED BACK LOCATION, UNSPECIFIED BACK PAIN LATERALITY: ICD-10-CM

## 2021-06-11 DIAGNOSIS — R20.0 NUMBNESS AND TINGLING OF BOTH FEET: ICD-10-CM

## 2021-06-11 DIAGNOSIS — M54.18 RADICULOPATHY OF SACRAL REGION: ICD-10-CM

## 2021-06-11 PROCEDURE — 64405 NJX AA&/STRD GR OCPL NRV: CPT | Mod: 50 | Performed by: STUDENT IN AN ORGANIZED HEALTH CARE EDUCATION/TRAINING PROGRAM

## 2021-06-11 PROCEDURE — 700101 HCHG RX REV CODE 250: Performed by: STUDENT IN AN ORGANIZED HEALTH CARE EDUCATION/TRAINING PROGRAM

## 2021-06-11 PROCEDURE — 64405 NJX AA&/STRD GR OCPL NRV: CPT | Performed by: STUDENT IN AN ORGANIZED HEALTH CARE EDUCATION/TRAINING PROGRAM

## 2021-06-11 RX ORDER — LIDOCAINE HYDROCHLORIDE 20 MG/ML
2 INJECTION, SOLUTION INFILTRATION; PERINEURAL ONCE
Status: COMPLETED | OUTPATIENT
Start: 2021-06-11 | End: 2021-06-11

## 2021-06-11 RX ORDER — BUPIVACAINE HYDROCHLORIDE 5 MG/ML
2 INJECTION, SOLUTION EPIDURAL; INTRACAUDAL ONCE
Status: COMPLETED | OUTPATIENT
Start: 2021-06-11 | End: 2021-06-11

## 2021-06-11 RX ADMIN — LIDOCAINE HYDROCHLORIDE 2 ML: 20 INJECTION, SOLUTION EPIDURAL; INFILTRATION; INTRACAUDAL; PERINEURAL at 08:17

## 2021-06-11 RX ADMIN — BUPIVACAINE HYDROCHLORIDE 2 ML: 5 INJECTION, SOLUTION EPIDURAL; INTRACAUDAL; PERINEURAL at 08:15

## 2021-06-11 ASSESSMENT — FIBROSIS 4 INDEX: FIB4 SCORE: 1.58

## 2021-06-11 NOTE — PROGRESS NOTES
Occipital Nerve Block     Date/Time: 06/11/21 8:01 AM  Performed by: Obed Downs M.D.  Authorized by: Obed Downs M.D.   Indications: treatment of bilateral occipital neuralgia   Body area: head  Nerve: greater occipital  Laterality: bilateral.  Patient position: sitting  Needle size: 23 G  Procedure: Patient was sitting up-right in the chair. The posterior head at the level of the nuchal ridge was extensively cleaned with alcohol wipes on both side of her posterior head. After cleaning, greater occipital nerve was identified on each side by marking a point that is 2/3rds the distance from the mastoid process to the occipital notch. 2 cc of 0.5 % Bupivicaine and 2 cc of 1% lidocaine were mixed in equal fashion in a syringe. Then using a 23 gauge needle, 2 cc of  the 1:1 lidocaine/bupivacaine mixture was injected into the right greater occipital nerve, then the left for a total of 4 cc. There were no complications during the procedure and the patient tolerated the procedure well.  Outcome: pain unchanged  Patient tolerance: patient tolerated the procedure well with no immediate complications           Obed Downs M.D.

## 2021-06-29 ENCOUNTER — NON-PROVIDER VISIT (OUTPATIENT)
Dept: NEUROLOGY | Facility: MEDICAL CENTER | Age: 66
End: 2021-06-29
Attending: STUDENT IN AN ORGANIZED HEALTH CARE EDUCATION/TRAINING PROGRAM
Payer: MEDICARE

## 2021-06-29 ENCOUNTER — APPOINTMENT (OUTPATIENT)
Dept: RADIOLOGY | Facility: MEDICAL CENTER | Age: 66
End: 2021-06-29
Attending: STUDENT IN AN ORGANIZED HEALTH CARE EDUCATION/TRAINING PROGRAM
Payer: MEDICARE

## 2021-06-29 DIAGNOSIS — R20.0 NUMBNESS AND TINGLING OF BOTH FEET: ICD-10-CM

## 2021-06-29 DIAGNOSIS — R20.2 NUMBNESS AND TINGLING OF BOTH FEET: ICD-10-CM

## 2021-06-29 DIAGNOSIS — M54.2 CERVICAL PAIN (NECK): ICD-10-CM

## 2021-06-29 PROCEDURE — 95886 MUSC TEST DONE W/N TEST COMP: CPT | Performed by: SPECIALIST

## 2021-06-29 PROCEDURE — 95910 NRV CNDJ TEST 7-8 STUDIES: CPT | Performed by: SPECIALIST

## 2021-06-29 PROCEDURE — 95910 NRV CNDJ TEST 7-8 STUDIES: CPT | Mod: 26 | Performed by: SPECIALIST

## 2021-06-29 PROCEDURE — 95886 MUSC TEST DONE W/N TEST COMP: CPT | Mod: 26,LT | Performed by: SPECIALIST

## 2021-06-29 NOTE — PROCEDURES
NERVE CONDUCTION STUDIES AND ELECTROMYOGRAPHY REPORT        06/29/21      Referring provider: Óscar Carbajal M.D.      SUMMARY OF PATIENT'S CLINICAL HISTORY,PHYSICAL EXAM, AND RATIONALE FOR TESTING:    Subjective left upper extremity weakness and pain in her groin and left leg following surgery for prolapse.    Past Medical History is significant for :   Past Medical History:   Diagnosis Date   • Anemia    • Blood transfusion without reported diagnosis    • Urinary bladder disorder     hx of intersitial cystitis x 20 years       The electrodiagnostic studies were performed to evaluate for possible peripheral neuropathy versus radiculopathy.    ELECTRODIAGNOSTIC EXAMINATION:  Nerve conduction studies (NCS) and electromyography (EMG) are utilized to evaluate direct or indirect damage to the peripheral nervous system. NCS are performed to measure the nerve(s) response(s) to electrostimulation across a given nerve segment. EMG evaluates the passive and active electrical activity of the muscle(s) in question.  Muscles are innervated by specific peripheral nerves and roots. Often times, several nerves the muscle to be examined in order to determine the presence or absence of the disease process. Furthermore, nerves and muscles may need to be tested in a [id number] comparison, as well as in additional extremities, as this may be crucial in characterizing the extent of the disease process, which may be diffuse or isolated and of varying degree of severity. The extent of the neurodiagnostic exam is justified as it may help arrive to a proper diagnosis, which ultimately may contribute to better management of the patient. Therefore, the nerves to muscles examined during the study were medically necessary.    Unless otherwise noted, temperature of the extremity(s) study was monitored before and during the examination and remained between 32 and 36 degrees C for the upper extremities, and between 30 and 36 degrees C for  the lower extremities.      NERVE CONDUCTION STUDIES:  Sensory nerves:  -Left median sensory nerve was examined.The response was within normal limits.  -Left ulnar sensory nerve was examined. The response was within normal limits.  -Left sural nerve was tested. The response was within normal limits.    Motor nerves:   -Left median motor nerve was examined. Recording electrodes placed at the Abductor Pollicis Brevis muscles. The response was within normal limits.  -Left ulnar motor nerve was examined. Recording electrodes placed at the Abductor Digiti Minimi muscles. The response was within normal limits.  -Left tibial nerve was examined. Recording electrodes placed at the Abductor Hallucis muscles. The response was within normal limits.  -Left deep Peroneal motor nerve was examined. Recording electrodes were placed at the Extensor Digitorum Brevis muscles.The response was abnormal.  Onset latency was within normal limits, amplitude was decreased to 1.6 mV and conduction velocity was within normal limits    LATE RESPONSES:  F waves were obtained and the following nerves: Left median and left tibial.  The responses within normal limits for the patient's age.        ELECTROMYOGRAPHY:  The study was performed the concentric needle electrode. Fibrillation and fasciculation activity is graded by convention from none (0) to continuous (4+).  Needle electrode examination was performed in the following muscles: Left deltoid, biceps, triceps, first dorsal interosseous, abductor pollicis brevis, vastus lateralis, tibialis anterior, gastrocnemius, extensor digitorum brevis, and adductor longus.  The muscles tested demonstrated normal insertional activity, normal motor unit morphology and recruitment. There were no elements suggestive of active denervation.      Nerve Conduction Studies     Stim Site NR Onset (ms) Norm Onset (ms) O-P Amp (µV) Norm O-P Amp Site1 Site2 Delta-P (ms) Dist (cm) Vince (m/s) Norm Vince (m/s)   Left Median  Anti Sensory (2nd Digit)   Wrist    1.9 <3.8 16.4 >10 Wrist 2nd Digit 3.5 14.0 *40 >50   Left Sural Anti Sensory (Lat Mall)   Calf    2.8 <4.6 5.9 >3 Calf Lat Mall 3.8 14.0 *37 >40   Left Ulnar Anti Sensory (5th Digit)   Wrist    2.9 <3.2 26.6 >5 Wrist 5th Digit 4.0 14.0 *35 >50        Stim Site NR Onset (ms) Norm Onset (ms) O-P Amp (mV) Norm O-P Amp Site1 Site2 Delta-0 (ms) Dist (cm) Vince (m/s) Norm Vince (m/s)   Left Median Motor (Abd Poll Brev)   Wrist    3.2 <4 11.4 >5 Elbow Wrist 4.5 23.0 51 >50   Elbow    7.7  11.4          Left Peroneal EDB Motor (Ext Dig Brev)   Ankle    5.5 <6 *1.6 >2.5 B Fib Ankle 7.2 32.0 44 >40   B Fib    12.7  1.2  Poplt B Fib 2.1 10.0 48    Poplt    14.8  1.3          Left Tibial Motor (Abd  Brev)   Ankle    3.2 <6 14.1 >4 Knee Ankle 7.6 38.0 50 >40   Knee    10.8  8.4          Left Ulnar Motor (Abd Dig Min)   Wrist    3.0 <3.1 10.4 >7 B Elbow Wrist 3.1 16.0 52 >50   B Elbow    6.1  9.5  A Elbow B Elbow 1.7 10.0 59    A Elbow    7.8  10.2            F Wave Studies     NR F-Lat (ms) Lat Norm (ms)   Left Median (Abd Poll Brev)      25.55 <31   Left Tibial (Abd Hallucis)      44.60 <57                              Electromyography     Side Muscle Nerve Root Ins Act Fibs Psw Amp Dur Poly Recrt Int Pat Comment   Left VastusLat Femoral L2-4 Nml Nml Nml Nml Nml 0 Nml Nml    Left AntTibialis Dp Br Fibular L4-5 Nml Nml Nml Nml Nml 0 Nml Nml    Left Gastroc Tibial S1-2 Nml Nml Nml Nml Nml 0 Nml Nml    Left Ext Dig Brev Dp Br Fibular L5, S1 Nml Nml Nml Nml Nml 0 Nml Nml    Left AdductorLong Obturator L2-4 Nml Nml Nml Nml Nml 0 Nml Nml    Left Deltoid Axillary C5-6 Nml Nml Nml Nml Nml 0 Nml Nml    Left Biceps Musculocut C5-6 Nml Nml Nml Nml Nml 0 Nml Nml    Left Triceps Radial C6-7-8 Nml Nml Nml Nml Nml 0 Nml Nml    Left 1stDorInt Ulnar C8-T1 Nml Nml Nml Nml Nml 0 Nml Nml    Left Abd Poll Brev Median C8-T1 Nml Nml Nml Nml Nml 0 Nml Nml          DIAGNOSTIC INTERPRETATION:  Extensive  electrodiagnostic studies were performed to the left upper and lower extremities.  The results are as follows:    1.  Normal EMG and nerve conduction study left upper extremity.    2.  Mild decreased amplitude left common peroneal motor response with normal left tibial motor and sural sensory responses.    3.  Otherwise normal EMG and nerve conduction study left lower extremity.    CLINICAL DISCUSSION:   The low amplitude left peroneal motor response is of uncertain clinical significance.  This could represent a mild chronic L5 radiculopathy or an early axonal neuropathy.  There were no motor unit changes in peroneal nerve supplied muscles.  The obturator nerve supplied abductor longus was normal electrophysiologically.      JOSE ALBERTO Miranda M.D.

## 2021-07-16 ENCOUNTER — OFFICE VISIT (OUTPATIENT)
Dept: NEUROLOGY | Facility: MEDICAL CENTER | Age: 66
End: 2021-07-16
Attending: STUDENT IN AN ORGANIZED HEALTH CARE EDUCATION/TRAINING PROGRAM
Payer: MEDICARE

## 2021-07-16 VITALS
WEIGHT: 109 LBS | SYSTOLIC BLOOD PRESSURE: 118 MMHG | HEIGHT: 65 IN | HEART RATE: 129 BPM | TEMPERATURE: 96.6 F | OXYGEN SATURATION: 98 % | DIASTOLIC BLOOD PRESSURE: 76 MMHG | BODY MASS INDEX: 18.16 KG/M2

## 2021-07-16 DIAGNOSIS — R26.89 BALANCE PROBLEM: ICD-10-CM

## 2021-07-16 DIAGNOSIS — M25.50 PAIN IN JOINT, MULTIPLE SITES: ICD-10-CM

## 2021-07-16 DIAGNOSIS — M54.9 CHRONIC BACK PAIN, UNSPECIFIED BACK LOCATION, UNSPECIFIED BACK PAIN LATERALITY: ICD-10-CM

## 2021-07-16 DIAGNOSIS — G89.29 CHRONIC BACK PAIN, UNSPECIFIED BACK LOCATION, UNSPECIFIED BACK PAIN LATERALITY: ICD-10-CM

## 2021-07-16 DIAGNOSIS — R25.2 LEG CRAMPING: ICD-10-CM

## 2021-07-16 DIAGNOSIS — R20.2 NUMBNESS AND TINGLING OF BOTH FEET: ICD-10-CM

## 2021-07-16 DIAGNOSIS — G43.009 MIGRAINE WITHOUT AURA AND WITHOUT STATUS MIGRAINOSUS, NOT INTRACTABLE: ICD-10-CM

## 2021-07-16 DIAGNOSIS — M54.81 BILATERAL OCCIPITAL NEURALGIA: ICD-10-CM

## 2021-07-16 DIAGNOSIS — M54.18 RADICULOPATHY OF SACRAL REGION: ICD-10-CM

## 2021-07-16 DIAGNOSIS — M54.2 CERVICAL PAIN (NECK): ICD-10-CM

## 2021-07-16 DIAGNOSIS — R20.0 NUMBNESS AND TINGLING OF BOTH FEET: ICD-10-CM

## 2021-07-16 PROCEDURE — 99214 OFFICE O/P EST MOD 30 MIN: CPT | Performed by: STUDENT IN AN ORGANIZED HEALTH CARE EDUCATION/TRAINING PROGRAM

## 2021-07-16 RX ORDER — VENLAFAXINE HYDROCHLORIDE 75 MG/1
75 CAPSULE, EXTENDED RELEASE ORAL DAILY
Qty: 30 CAPSULE | Refills: 3 | Status: SHIPPED
Start: 2021-07-16 | End: 2021-07-16

## 2021-07-16 ASSESSMENT — FIBROSIS 4 INDEX: FIB4 SCORE: 1.58

## 2021-07-16 NOTE — PROGRESS NOTES
GENERAL NEUROLOGY   REFERRING PROVIDER:  Erlin Colón M.D.  [address] [place] [id number]      CHIEF COMPLAINT(S): headaches and numbness and dysesthesias    History of present illness:  Carlee Zavala 65 y.o. female presents today for subacute onset of headaches and progressive numbness.  Symptoms started several months ago.  She noticed some painful dysesthesias in her hands and feet in between periods of numbness particularly in her bilateral feet.  Initially episodic but becoming more chronic now.  It is affecting her gait to the point where she has to look down at her feet and at times wall skirt to maintain balance.  Her disequilibrium feels off.  Left hand is a little bit worse in terms of numbness and dysesthesias in the distal hand and fingers.  Also noticed some weakness there as well.  She has been dropping things in her left hand more frequently.   Left hand tendoninits.  Couple weeks ago there was some report of fever.  In the past couple months also she has had some chronic watery diarrhea.  She also has joint and tendon pain in her hands and ankles.  She has some radicular lumbar pain radiating down her posterior lateral legs left more than right.  She also has bilateral sacroiliac pain.  There is some redness around her joints as well.  She has some central cervical neck pain as well.  Denies any recent trauma.  Sometimes the pain will be sporadically sharp particularly in her left hand.  She was told that she had gout.    Around the same time of these sensory complaints she developed a change from her migraine headaches that she has been having for years.  Specifically they more frequent and intense happening a couple days in a week.  They typically start in the back of her head and neck and radiate anteriorly to the front of her head as well as the temple region.  This moderate to severe in intensity.  Sharp as well as throbbing quality of pain.  Typically they last 2 hours.  Think  stress is a trigger.  Denies any changes in her vision.  Has tried sumatriptan without major improvement as well.      Sharp pain, in left wrist    Balance is off as well.    Also reports episodic dizziness especially when standing recently.    Patient's PMH, PSH, FH, and SH were reviewed. No Autoimmunity in her family that she is aware.    Medications and allergies were reviewed.    INTERVAL HISTORY:  onbs help with headaches/occipital neuralgia for aseveral weeks at a time  Reviewed labs and EMG/NCS         Past medical history:   Past Medical History:   Diagnosis Date   • Anemia    • Blood transfusion without reported diagnosis    • Urinary bladder disorder     hx of intersitial cystitis x 20 years       Past surgical history:   Past Surgical History:   Procedure Laterality Date   • PB LAP, SURG PROCTOPEXY  1/21/2020    Procedure: RECTOPEXY, ROBOT-ASSISTED, LAPAROSCOPIC, USING DA VIRGINIE XI;  Surgeon: Daniel Palacio M.D.;  Location: SURGERY [facility] [place];  Service: General   • LOW ANTERIOR RESECTION ROBOTIC XI  1/21/2020    Procedure: RESECTION, LOW ANTERIOR, ROBOT-ASSISTED, LAPAROSCOPIC, USING DA VIRGINIE XI;  Surgeon: Daniel Palacio M.D.;  Location: SURGERY [facility] [place];  Service: General       Family history:   Family History   Problem Relation Age of Onset   • Hypertension Mother    • Hypertension Father        Social history:   Social History     Socioeconomic History   • Marital status:      Spouse name: Not on file   • Number of children: Not on file   • Years of education: Not on file   • Highest education level: Not on file   Occupational History   • Not on file   Tobacco Use   • Smoking status: Never Smoker   • Smokeless tobacco: Never Used   Vaping Use   • Vaping Use: Never used   Substance and Sexual Activity   • Alcohol use: Not Currently   • Drug use: Not Currently   • Sexual activity: Not Currently   Other Topics Concern   • Not on file   Social History Narrative   • Not on file      Social Determinants of Health     Financial Resource Strain:    • Difficulty of Paying Living Expenses:    Food Insecurity:    • Worried About Running Out of Food in the Last Year:    • Ran Out of Food in the Last Year:    Transportation Needs:    • Lack of Transportation (Medical):    • Lack of Transportation (Non-Medical):    Physical Activity:    • Days of Exercise per Week:    • Minutes of Exercise per Session:    Stress:    • Feeling of Stress :    Social Connections:    • Frequency of Communication with Friends and Family:    • Frequency of Social Gatherings with Friends and Family:    • Attends Judaism Services:    • Active Member of Clubs or Organizations:    • Attends Club or Organization Meetings:    • Marital Status:    Intimate Partner Violence:    • Fear of Current or Ex-Partner:    • Emotionally Abused:    • Physically Abused:    • Sexually Abused:        Current medications:   Current Outpatient Medications   Medication   • gabapentin (NEURONTIN) 300 MG Cap   • olopatadine (PATANOL) 0.1 % ophthalmic solution   • Doxylamine Succinate, Sleep, (SLEEP AID PO)   • ALPRAZolam (XANAX) 0.5 MG Tab   • tizanidine (ZANAFLEX) 4 MG Tab   • sumatriptan (IMITREX) 100 MG tablet     No current facility-administered medications for this visit.       Medication Allergy:  Allergies   Allergen Reactions   • Sulfa Drugs      hives         Review of systems:   Pertinent positives and negatives are as outlined above      Physical examination:   Vitals:    07/16/21 0941   BP: 118/76   Pulse: (!) 129   Temp: 35.9 °C (96.6 °F)   SpO2: 98%       General: Patient in no acute distress, pleasant and cooperative.  HEENT: Normocephalic, no signs of acute trauma.  Bilateral occipital tenderness  Neck: appears supple, here is normal range of motion. Mild SI and upper C spine midline tenderness with no stepoff.   Chest: clear to auscultation. Symmetrical chest rise with inhalation. No cough.   CV: RRR, no murmurs.   Skin: no signs  of acute rashes or trauma.   Musculoskeletal: tenderness of the ulnar aspect of her left wrist, with mild erythema and decreased ROM.  Psychiatric: pertinent positives as discussed above    NEUROLOGICAL EXAM:   Mental status:  orientation: Awake, alert and oriented to self, month, year, situation.  Attention: Intact  Speech and language: speech is clear and fluent. The patient is able to name, repeat and comprehend. No word substitions or paraphasic errors  Memory: There is intact recollection of recent and remote events.   Cranial nerve exam:   I: smell Not tested   II: visual acuity  Not Tested   II: Fundoscpic No papilledema and normal optic disc bilaterally   II: visual fields Full to confrontation  Visual neglect: absent   II: pupils Equal, round, reactive to light   III,VII: ptosis None   III,IV,VI: extraocular muscles  EOMI   V: mastication Normal   V: facial light touch sensation  Normal   V,VII: corneal reflex  Not tested   VII: facial muscle function - upper  Normal   VII: facial muscle function - lower Normal   VIII: hearing Not tested   IX: soft palate elevation  Normal   IX,X: gag reflex Not tested   XI: trapezius strength  NT   XI: sternocleidomastoid strength NT   XI: neck flexion strength  NT   XII: tongue  Protrudes Midline     Motor exam:   • Mild weakness in her left , partially pain limited weakness although there is some mild thenar atrophy noted  • Tone is normal.  • No abnormal movements were seen on exam.   • No muscle fasciculation  Sensory exam reveals normal sense of light touch, proprioception, vibration and pinprick in all extremities. Cortical sensory testing: Normal. Test of neglect: none present to simultaneous stimulation with touch  Deep tendon reflexes:  2+ AJ b/l. 2+ left patella. 3+ right patella. + Right cross adductor. Brisk reflexes in bilateral biceps and BR R>L. diminished left tricep reflex. throughout. Plantar responses  Coordination: shows a normal finger-nose-finger.  Normal rapidly alternating movements. Heel-knee-shin movements smooth and coordinated bilaterally.   Gait:   • Somewhat wide based gait, cautiously looking at her feet the whole time as well as wall surfing  • Romberg exam postivie        ANCILLARY DATA REVIEWED:       Lab Data Review:  Reviewed    Records reviewed:   Reviewed    Imaging:   Reviewed    EEG:  NA      ASSESSMENT, PLAN, EDUCATION/COUNSELIN-year-old female with progressive sensory loss and dysesthesias in her distal extremities, subacute onset of a new headache phenotype in the past couple months.  EMG/NCS unrevealing. This may possible reflect a small fiber neuropathy. She is benefiting with ONB so we will continue these routinely. She is hesitant to start a medication for her headaches. Discussed starting effexor but she want to read about it first. Will plan a procedure only visit next Friday at 240 for repeat bilateral ONBs.    Visit Diagnoses     ICD-10-CM   1. Bilateral occipital neuralgia  M54.81   2. Migraine without aura and without status migrainosus, not intractable  G43.009   3. Cervical pain (neck)  M54.2   4. Radiculopathy of sacral region  M54.18   5. Chronic back pain, unspecified back location, unspecified back pain laterality  M54.9    G89.29   6. Leg cramping  R25.2   7. Pain in joint, multiple sites  M25.50   8. Balance problem  R26.89   9. Numbness and tingling of both feet  R20.0    R20.2       FOLLOW-UP:   1 weeks            BILLING DOCUMENTATION:       Counseling:  I spent a total of 30 minutes of face-to-face time in this visit. Over 50% of the time of the visit today was spent on counseling and or coordination of care wtih the patient and/or family, as above in assessment in plan.       Obed Downs MD  Epilepsy and General Neurology  Department of Neurology  Clinical  of Neurology [facility].

## 2021-07-16 NOTE — PATIENT INSTRUCTIONS
Neurology Clinic Visit     IMPORTANT: If imaging, procedure(s), AND/or referrals were placed for you:  Contact [facility] Scheduling if you have not heard from them in 5 day: [phone]    Outpatient [facility] Imaging Sites.  • [address]  Mon-Fri 8am-5pm   • [address] (Breast Center) Mon-Fri 7am-4pm    • [address]  Mon-Fri 8am-5pm  • [facility] Radiology 7am-6:30pm  • [address] Mon-Fri 8am-7pm  Sat 9am-6pm   • [address]  Mon-Fri 8am-7pm and Sat/Sun 9am-5pm  • [address]  Mon-Fri 8am-5pm  • [address] (PET/CT)  Mon-Fri 8:30am-4:30pm     If labs were ordered for you:  • To Schedule an appointment for lab services, please call [phone] or visit [email or web address].  • [facility] Lab Services Convenient Locations:    [place]: • [address] (Ground Floor)  • [address] (Admitting Entrance)  • [address]  • [address]  • [address] [facility], [address]  • [address] [facility] [address]  • [address]: • [address]  • [address]       [place]: • [address]  • [address]     [facility]     [address] [address]     Laboratory Hours: 6:30am - 6pm  Monday - Friday,   7am - 2pm Saturday    [facility]      [address] [place] [address]      Laboratory Hours:  7:30am - 4pm  Monday - Friday,  9am - 3pm Saturday    [facility]     [address]    [place] [address]      Laboratory Hours:  7:00am - 5:30pm  Monday - Friday    [facility]      [address] [address]       Laboratory Hours:  7:30am - 4:30pm  Monday - Friday    [facility]       [address] [place] [address]       Laboratory Hours:  8am - 5pm  Monday - Friday    [facility]       [address] [place] [id number]        Laboratory hours:  7:30am - 4pm  Monday - Friday    GENERAL REMINDERS:  · Request refills 1 week in advance to ensure you do not run out of medications  · All diagnostic study results will be reviewed at your next visit, UNLESS there are urgent results that need to be acted on sooner.  · Please remember that it is the your responsibility to check with your Insurance for benefit coverage for visit / visits.  · 24 hours notice is required for all appointment changes or cancellation.  · Please arrive 20 min. before your appointment time  · Please note that because Dr. Downs has high daily clinic volume, he is unable to accommodate late arrivals. If you are more than 15 minutes late for the scheduled appointment you will be asked to reschedule  · Please bring the following with you:  1) Picture ID  2) Insurance card  3) An updated list of ALL your medications and their dosages (prescribed medications, over the counter medications, and all supplements), as well as allergies with you at all times  4) A list of questions, concerns, comments that you wish to discuss with Dr. Yareli Downs MD  General Neurologist and Epileptologist  Department of Neurology  Instructor of Clinical Neurology [facility].

## 2021-07-23 ENCOUNTER — OFFICE VISIT (OUTPATIENT)
Dept: NEUROLOGY | Facility: MEDICAL CENTER | Age: 66
End: 2021-07-23
Attending: STUDENT IN AN ORGANIZED HEALTH CARE EDUCATION/TRAINING PROGRAM
Payer: MEDICARE

## 2021-07-23 VITALS
OXYGEN SATURATION: 96 % | SYSTOLIC BLOOD PRESSURE: 118 MMHG | BODY MASS INDEX: 18.66 KG/M2 | WEIGHT: 112 LBS | HEART RATE: 125 BPM | TEMPERATURE: 97.6 F | HEIGHT: 65 IN | DIASTOLIC BLOOD PRESSURE: 56 MMHG

## 2021-07-23 DIAGNOSIS — M54.81 BILATERAL OCCIPITAL NEURALGIA: ICD-10-CM

## 2021-07-23 PROCEDURE — 64405 NJX AA&/STRD GR OCPL NRV: CPT

## 2021-07-23 PROCEDURE — 99211 OFF/OP EST MAY X REQ PHY/QHP: CPT | Performed by: STUDENT IN AN ORGANIZED HEALTH CARE EDUCATION/TRAINING PROGRAM

## 2021-07-23 PROCEDURE — 64405 NJX AA&/STRD GR OCPL NRV: CPT | Performed by: STUDENT IN AN ORGANIZED HEALTH CARE EDUCATION/TRAINING PROGRAM

## 2021-07-23 PROCEDURE — 64405 NJX AA&/STRD GR OCPL NRV: CPT | Mod: 50 | Performed by: STUDENT IN AN ORGANIZED HEALTH CARE EDUCATION/TRAINING PROGRAM

## 2021-07-23 PROCEDURE — 700101 HCHG RX REV CODE 250

## 2021-07-23 RX ORDER — LIDOCAINE HYDROCHLORIDE 20 MG/ML
4 INJECTION, SOLUTION INFILTRATION; PERINEURAL ONCE
Status: COMPLETED | OUTPATIENT
Start: 2021-07-23 | End: 2021-07-23

## 2021-07-23 RX ADMIN — LIDOCAINE HYDROCHLORIDE 4 ML: 20 INJECTION, SOLUTION INFILTRATION; PERINEURAL at 15:28

## 2021-07-23 ASSESSMENT — FIBROSIS 4 INDEX: FIB4 SCORE: 1.58

## 2021-07-23 NOTE — PROCEDURES
Obed Downs M.D.     07/23/21  Bilteral Occipital nerve block     Performed by: Obed Downs M.D.   Authorized by: Obed Downs M.D.   Preparation: Patient was prepped and draped in the usual sterile fashion.   Local anesthesia used: yes   Anesthesia: nerve block     Anesthesia:   Local anesthesia used: yes   Local Anesthetic: lidocaine 2% without epinephrine     Sedation:   Patient sedated: no     Patient tolerance: patient tolerated the procedure well with no immediate   complications   Comments: Indication: for treatment of occipital neuralgia bilaterally and   migraine headaches     Description of the procedure: Patient was sitting up-right in the chair.   The posterior head at the level of the nuchal ridge was extensively   cleaned with alcohol wipes on both sides. After cleaning, greater   occipital nerve was identified on each side by marking a point that is   2/3rds the distance from the mastoid process to the occipital notch. 4 cc of    2% lidocaine was placed in a syringe. Then using a 23 gauge needle, 2 cc  was injected into the right greater occipital nerve, then the left for a total of 4 cc. There were no   complications during the procedure and the patient tolerated the procedure   well.     Obed Downs M.D.  Neurology

## 2021-08-12 ENCOUNTER — APPOINTMENT (RX ONLY)
Dept: URBAN - METROPOLITAN AREA CLINIC 36 | Facility: CLINIC | Age: 66
Setting detail: DERMATOLOGY
End: 2021-08-12

## 2021-08-12 DIAGNOSIS — Z41.9 ENCOUNTER FOR PROCEDURE FOR PURPOSES OTHER THAN REMEDYING HEALTH STATE, UNSPECIFIED: ICD-10-CM

## 2021-08-12 PROCEDURE — ? JUVEDERM VOLBELLA INJECTION

## 2021-08-12 NOTE — PROCEDURE: JUVEDERM VOLBELLA INJECTION
Additional Area 5 Volume In Cc: 0
Consent: Written consent obtained. Risks include but not limited to bruising, beading, irregular texture, ulceration, infection, allergic reaction, scar formation, incomplete augmentation, temporary nature, procedural pain.
Include Cannula Information In Note?: No
Filler: Juvederm Volbella XC
Additional Area 2 Location: left earlobe line
Anesthesia Type: 1% lidocaine with epinephrine
Map Statment: See Attach Map for Complete Details
Post-Care Instructions: Patient instructed to apply ice to reduce swelling.
Lot #: [id number]
Detail Level: Detailed
Additional Anesthesia Volume In Cc: 6
Additional Area 1 Location: chin
Procedural Text: The filler was administered to the treatment areas noted above.
Number Of Syringes (Required For Inventory): 1
Additional Area 3 Location: jawline
Expiration Date (Month Year): 10/18/2022

## 2021-08-30 ENCOUNTER — OFFICE VISIT (OUTPATIENT)
Dept: NEUROLOGY | Facility: MEDICAL CENTER | Age: 66
End: 2021-08-30
Attending: STUDENT IN AN ORGANIZED HEALTH CARE EDUCATION/TRAINING PROGRAM
Payer: MEDICARE

## 2021-08-30 VITALS
HEART RATE: 71 BPM | BODY MASS INDEX: 18.45 KG/M2 | SYSTOLIC BLOOD PRESSURE: 116 MMHG | WEIGHT: 110.89 LBS | DIASTOLIC BLOOD PRESSURE: 76 MMHG | OXYGEN SATURATION: 97 % | TEMPERATURE: 98 F

## 2021-08-30 DIAGNOSIS — M54.81 BILATERAL OCCIPITAL NEURALGIA: ICD-10-CM

## 2021-08-30 PROCEDURE — 64405 NJX AA&/STRD GR OCPL NRV: CPT | Mod: 50 | Performed by: STUDENT IN AN ORGANIZED HEALTH CARE EDUCATION/TRAINING PROGRAM

## 2021-08-30 PROCEDURE — 64744 INCISE NERVE BACK OF HEAD: CPT | Performed by: STUDENT IN AN ORGANIZED HEALTH CARE EDUCATION/TRAINING PROGRAM

## 2021-08-30 PROCEDURE — 64405 NJX AA&/STRD GR OCPL NRV: CPT | Performed by: STUDENT IN AN ORGANIZED HEALTH CARE EDUCATION/TRAINING PROGRAM

## 2021-08-30 PROCEDURE — 99211 OFF/OP EST MAY X REQ PHY/QHP: CPT | Performed by: STUDENT IN AN ORGANIZED HEALTH CARE EDUCATION/TRAINING PROGRAM

## 2021-08-30 PROCEDURE — 700101 HCHG RX REV CODE 250: Performed by: STUDENT IN AN ORGANIZED HEALTH CARE EDUCATION/TRAINING PROGRAM

## 2021-08-30 RX ORDER — LIDOCAINE HYDROCHLORIDE 20 MG/ML
4 INJECTION, SOLUTION EPIDURAL; INFILTRATION; INTRACAUDAL; PERINEURAL ONCE
Status: COMPLETED | OUTPATIENT
Start: 2021-08-30 | End: 2021-08-30

## 2021-08-30 RX ADMIN — LIDOCAINE HYDROCHLORIDE 4 ML: 20 INJECTION, SOLUTION EPIDURAL; INFILTRATION; INTRACAUDAL; PERINEURAL at 09:17

## 2021-08-30 ASSESSMENT — FIBROSIS 4 INDEX: FIB4 SCORE: 1.58

## 2021-08-30 NOTE — PROCEDURES
Occipital Nerve Block     Indication: bilateral occipital neuralgia, migraines    Date/Time: 08/30/21 9:11 AM  Performed by: Obed Downs M.D.  Authorized by: Obed Downs M.D.   Indications: pain relief  Body area: head  Nerve: greater occipital  Laterality: bilateral.  Patient position: sitting  Needle size: 25 G  Location technique: anatomical landmarks  Local anesthetic: 4 of 2% lidocaine  Anesthetic total: 4 mL  Outcome:      Procedure: Patient was sitting up-right in the chair. The posterior head at the level of the nuchal ridge was extensively cleaned with alcohol wipes on both side of her posterior head. After cleaning, greater occipital nerve was identified on each side by marking a point that is 2/3rds the distance from the mastoid process to the occipital notch.  4 cc of 2% lidocaine were placed in a syringe. Then using a 25 gauge needle, 2 cc of  was injected into the right greater occipital nerve, then the left for a total of 4 cc. There were no complications during the procedure and the patient tolerated the procedure well.          Obed Downs M.D.

## 2021-09-24 ENCOUNTER — APPOINTMENT (RX ONLY)
Dept: URBAN - METROPOLITAN AREA CLINIC 36 | Facility: CLINIC | Age: 66
Setting detail: DERMATOLOGY
End: 2021-09-24

## 2021-09-24 DIAGNOSIS — L98.8 OTHER SPECIFIED DISORDERS OF THE SKIN AND SUBCUTANEOUS TISSUE: ICD-10-CM

## 2021-09-24 DIAGNOSIS — Z41.9 ENCOUNTER FOR PROCEDURE FOR PURPOSES OTHER THAN REMEDYING HEALTH STATE, UNSPECIFIED: ICD-10-CM

## 2021-09-24 PROCEDURE — ? PULSED-DYE LASER

## 2021-09-24 PROCEDURE — ? COSMETIC FOLLOW-UP

## 2021-09-24 ASSESSMENT — LOCATION DETAILED DESCRIPTION DERM: LOCATION DETAILED: LEFT INFERIOR VERMILION LIP

## 2021-09-24 ASSESSMENT — LOCATION ZONE DERM: LOCATION ZONE: LIP

## 2021-09-24 ASSESSMENT — LOCATION SIMPLE DESCRIPTION DERM: LOCATION SIMPLE: LEFT LIP

## 2021-09-24 NOTE — PROCEDURE: PULSED-DYE LASER
Pulse Duration: 10 ms
Detail Level: Zone
Cryogen Time (Ms): 30
Delay Time (Ms): 20
Spot Size: 7 mm
Pulse Duration: 1.5 ms
Price (Use Numbers Only, No Special Characters Or $): 0.00
Immediate Endpoint: purpura
Laser Type: Vbeam 595nm
Post-Care Instructions: I reviewed with the patient in detail post-care instructions. Patient should stay away from the sun and wear sun protection until treated areas are fully healed.
Fluence In J/Cm2 (Optional): 7
Spot Size: 10 mm
Treated Area: small area
Post-Procedure Care: Vaseline and ice applied. Post care reviewed with patient.
Consent: Written consent obtained, risks reviewed including but not limited to crusting, scabbing, blistering, scarring, darker or lighter pigmentary change, incidental hair removal, bruising, and/or incomplete removal.

## 2021-09-24 NOTE — PROCEDURE: COSMETIC FOLLOW-UP
Global Improvement: Very Good
Detail Level: Zone
Price (Use Numbers Only, No Special Characters Or $): 0.00
Patient Satisfaction: Very pleased

## 2021-10-18 ENCOUNTER — APPOINTMENT (OUTPATIENT)
Dept: RADIOLOGY | Facility: MEDICAL CENTER | Age: 66
End: 2021-10-18
Attending: STUDENT IN AN ORGANIZED HEALTH CARE EDUCATION/TRAINING PROGRAM
Payer: MEDICARE

## 2021-10-28 ENCOUNTER — APPOINTMENT (OUTPATIENT)
Dept: NEUROLOGY | Facility: MEDICAL CENTER | Age: 66
End: 2021-10-28
Attending: STUDENT IN AN ORGANIZED HEALTH CARE EDUCATION/TRAINING PROGRAM
Payer: MEDICARE

## 2021-12-09 ENCOUNTER — APPOINTMENT (RX ONLY)
Dept: URBAN - METROPOLITAN AREA CLINIC 36 | Facility: CLINIC | Age: 66
Setting detail: DERMATOLOGY
End: 2021-12-09

## 2021-12-09 DIAGNOSIS — Z41.9 ENCOUNTER FOR PROCEDURE FOR PURPOSES OTHER THAN REMEDYING HEALTH STATE, UNSPECIFIED: ICD-10-CM

## 2021-12-09 PROCEDURE — ? JUVEDERM VOLBELLA INJECTION

## 2021-12-09 NOTE — PROCEDURE: JUVEDERM VOLBELLA INJECTION
Additional Area 5 Volume In Cc: 0
Consent: Written consent obtained. Risks include but not limited to bruising, beading, irregular texture, ulceration, infection, allergic reaction, scar formation, incomplete augmentation, temporary nature, procedural pain.
Include Cannula Information In Note?: No
Filler: Juvederm Volbella XC
Additional Area 2 Location: left earlobe line
Anesthesia Type: 1% lidocaine with epinephrine
Map Statment: See Attach Map for Complete Details
Post-Care Instructions: Patient instructed to apply ice to reduce swelling.
Lot #: [id number]
Detail Level: Detailed
Additional Anesthesia Volume In Cc: 6
Additional Area 1 Location: chin
Procedural Text: The filler was administered to the treatment areas noted above.
Number Of Syringes (Required For Inventory): 1
Additional Area 3 Location: jawline
Expiration Date (Month Year): 02/11/2023

## 2022-03-15 ENCOUNTER — APPOINTMENT (RX ONLY)
Dept: URBAN - METROPOLITAN AREA CLINIC 20 | Facility: CLINIC | Age: 67
Setting detail: DERMATOLOGY
End: 2022-03-15

## 2022-03-15 DIAGNOSIS — Z41.9 ENCOUNTER FOR PROCEDURE FOR PURPOSES OTHER THAN REMEDYING HEALTH STATE, UNSPECIFIED: ICD-10-CM

## 2022-03-15 PROCEDURE — ? SCITON BBL

## 2022-03-15 ASSESSMENT — LOCATION SIMPLE DESCRIPTION DERM
LOCATION SIMPLE: LEFT CHEEK
LOCATION SIMPLE: RIGHT CHEEK

## 2022-03-15 ASSESSMENT — LOCATION DETAILED DESCRIPTION DERM
LOCATION DETAILED: RIGHT CENTRAL MALAR CHEEK
LOCATION DETAILED: LEFT CENTRAL MALAR CHEEK

## 2022-03-15 ASSESSMENT — LOCATION ZONE DERM: LOCATION ZONE: FACE

## 2022-03-15 NOTE — HPI: OTHER
Condition:: Hyperpigmentation and sun damage.\\nFine line and wrinkles.
Please Describe Your Condition:: Wears sunscreen daily.\\nNot prone to cold sores.

## 2022-03-15 NOTE — PROCEDURE: SCITON BBL
Fluence (J/Cm2): 8
Price (Use Numbers Only, No Special Characters Or $): 450.00
Spot Size: Finesse Adapter Size: 15 x 45 mm (No Finesse Adapter)
Fluence (J/Cm2): 15
Cooling ?: Yes
Repetition Rate (Hz): 10
Passes: 1
Spot Size: Finesse Adapter Size: 7 mm round
Pulse Duration Units: milliseconds
Cooling (In C): 20
Fluence (J/Cm2): 25
Additional Comments (Optional): same setting 15 x 15 forehead
Anesthesia Volume In Cc: 0
Fluence (J/Cm2): 12
Spot Size: Finesse Adapter Size: 15 x 15 mm square
Fluence (J/Cm2): 14
Consent: Written consent obtained, risks reviewed including but not limited to crusting, scabbing, blistering, scarring, darker or lighter pigmentary change, bruising, and/or incomplete response.
Detail Level: Zone
Preprocedure Text: The treatment areas were thoroughly cleaned. Any exposed at risk hair that was not to be treated was covered in white paper tape. Clear ultrasound gel was applied to the treatment area. The area was treated with no immediate stacking of pulses.
Post Procedure Text: The patient tolerated the procedure well. Ice-chilled washclothes were applied to the treatment area for comfort. Post care was reviewed with the patient.
Post-Care Instructions: I reviewed with the patient in detail post-care instructions. Patient should stay away from the sun and wear sun protection until treated areas are fully healed.
Hide Repetition Rate?: No
Location Override (Will Not Show Above If Text Entered): spot treat

## 2022-04-21 ENCOUNTER — APPOINTMENT (RX ONLY)
Dept: URBAN - METROPOLITAN AREA CLINIC 36 | Facility: CLINIC | Age: 67
Setting detail: DERMATOLOGY
End: 2022-04-21

## 2022-04-21 DIAGNOSIS — Z41.9 ENCOUNTER FOR PROCEDURE FOR PURPOSES OTHER THAN REMEDYING HEALTH STATE, UNSPECIFIED: ICD-10-CM

## 2022-04-21 PROCEDURE — ? BOTOX

## 2022-04-21 PROCEDURE — ? JUVEDERM VOLLURE XC INJECTION

## 2022-04-21 NOTE — PROCEDURE: BOTOX
Depressor Anguli Oris Units: 0
Show Additional Area 6: Yes
Lot #: [id number]
Price (Use Numbers Only, No Special Characters Or $): 0.00
Show Lcl Units: No
Additional Area 1 Location: upper lip
Dilution (U/0.1 Cc): 0.6
Glabellar Complex Units: 20
Additional Area 3 Location: masseter
Consent: Written consent obtained. Risks include but not limited to lid/brow ptosis, bruising, swelling, diplopia, temporary effect, incomplete chemical denervation.
Expiration Date (Month Year): 09/2024
Detail Level: Detailed
Periorbital Skin Units: 30
Post-Care Instructions: Patient instructed to not lie down for 4 hours and limit physical activity for 24 hours. Patient instructed not to travel by airplane for 48 hours.
Additional Area 2 Location: chin

## 2022-04-21 NOTE — PROCEDURE: JUVEDERM VOLLURE XC INJECTION
Brows Filler Volume In Cc: 0
Consent: Written consent obtained. Risks include but not limited to bruising, beading, irregular texture, ulceration, infection, allergic reaction, scar formation, incomplete augmentation, temporary nature, procedural pain.
Expiration Date (Month Year): 02/28/2023
Number Of Syringes (Required For Inventory): 1
Procedural Text: The filler was administered to the treatment areas noted above.
Anesthesia Volume In Cc: 0.5
Include Cannula Information In Note?: Yes
Include Cannula Size?: 27G
Anesthesia Type: 1% lidocaine with epinephrine
Including Pricing Information In The Note: No
Detail Level: Detailed
Map Statment: See Attach Map for Complete Details
Lot #: [id number]
Post-Care Instructions: Patient instructed to apply ice to reduce swelling.
Filler: Juvederm Vollure XC
Include Cannula Brand?: DermaSculpt
Additional Anesthesia Volume In Cc: 6
Include Cannula Length?: 1.5 inch

## 2022-07-28 ENCOUNTER — APPOINTMENT (RX ONLY)
Dept: URBAN - METROPOLITAN AREA CLINIC 36 | Facility: CLINIC | Age: 67
Setting detail: DERMATOLOGY
End: 2022-07-28

## 2022-07-28 DIAGNOSIS — Z41.9 ENCOUNTER FOR PROCEDURE FOR PURPOSES OTHER THAN REMEDYING HEALTH STATE, UNSPECIFIED: ICD-10-CM

## 2022-07-28 PROCEDURE — ? BOTOX

## 2022-07-28 NOTE — PROCEDURE: BOTOX
Depressor Anguli Oris Units: 0
Show Additional Area 6: Yes
Lot #: [id number]
Price (Use Numbers Only, No Special Characters Or $): 0.00
Show Lcl Units: No
Additional Area 1 Location: upper lip
Dilution (U/0.1 Cc): 0.6
Glabellar Complex Units: 20
Additional Area 3 Location: masseter
Consent: Written consent obtained. Risks include but not limited to lid/brow ptosis, bruising, swelling, diplopia, temporary effect, incomplete chemical denervation.
Expiration Date (Month Year): 04/2025
Detail Level: Detailed
Periorbital Skin Units: 30
Post-Care Instructions: Patient instructed to not lie down for 4 hours and limit physical activity for 24 hours. Patient instructed not to travel by airplane for 48 hours.
Additional Area 2 Location: chin

## 2022-10-13 ENCOUNTER — APPOINTMENT (RX ONLY)
Dept: URBAN - METROPOLITAN AREA CLINIC 36 | Facility: CLINIC | Age: 67
Setting detail: DERMATOLOGY
End: 2022-10-13

## 2022-10-13 DIAGNOSIS — L81.8 OTHER SPECIFIED DISORDERS OF PIGMENTATION: ICD-10-CM

## 2022-10-13 PROCEDURE — ? SCITON BBL

## 2022-10-13 ASSESSMENT — LOCATION ZONE DERM: LOCATION ZONE: FACE

## 2022-10-13 ASSESSMENT — LOCATION SIMPLE DESCRIPTION DERM: LOCATION SIMPLE: RIGHT CHEEK

## 2022-10-13 ASSESSMENT — LOCATION DETAILED DESCRIPTION DERM: LOCATION DETAILED: RIGHT CENTRAL MALAR CHEEK

## 2022-10-13 NOTE — PROCEDURE: SCITON BBL
Passes: 1
Fluence (J/Cm2): 25
Cooling (In C): 15
Anesthesia Type: 1% lidocaine with epinephrine
Pulse Duration: 10
Pulse Duration: 20
Pulse Duration Units: milliseconds
Preprocedure Text: The treatment areas were thoroughly cleaned. Any exposed at risk hair that was not to be treated was covered in white paper tape. Clear ultrasound gel was applied to the treatment area. The area was treated with no immediate stacking of pulses.
Anesthesia Volume In Cc: 0
Spot Size: Finesse Adapter Size: 15 x 15 mm square
Post Procedure Text: The patient tolerated the procedure well. Ice-chilled washclothes were applied to the treatment area for comfort. Post care was reviewed with the patient.
Consent: Written consent obtained, risks reviewed including but not limited to crusting, scabbing, blistering, scarring, darker or lighter pigmentary change, bruising, and/or incomplete response.
Post-Care Instructions: I reviewed with the patient in detail post-care instructions. Patient should stay away from the sun and wear sun protection until treated areas are fully healed.
Hide Repetition Rate?: No
Detail Level: Zone
Fluence (J/Cm2): 21

## 2022-10-20 ENCOUNTER — HOSPITAL ENCOUNTER (OUTPATIENT)
Dept: RADIOLOGY | Facility: MEDICAL CENTER | Age: 67
End: 2022-10-20
Attending: INTERNAL MEDICINE
Payer: MEDICARE

## 2022-10-20 DIAGNOSIS — Z12.31 VISIT FOR SCREENING MAMMOGRAM: ICD-10-CM

## 2022-10-20 PROCEDURE — 77063 BREAST TOMOSYNTHESIS BI: CPT

## 2022-11-07 ENCOUNTER — PATIENT MESSAGE (OUTPATIENT)
Dept: HEALTH INFORMATION MANAGEMENT | Facility: OTHER | Age: 67
End: 2022-11-07

## 2022-11-17 ENCOUNTER — APPOINTMENT (RX ONLY)
Dept: URBAN - METROPOLITAN AREA CLINIC 20 | Facility: CLINIC | Age: 67
Setting detail: DERMATOLOGY
End: 2022-11-17

## 2022-11-17 DIAGNOSIS — Z41.9 ENCOUNTER FOR PROCEDURE FOR PURPOSES OTHER THAN REMEDYING HEALTH STATE, UNSPECIFIED: ICD-10-CM

## 2022-11-17 PROCEDURE — ? BOTOX

## 2022-11-17 PROCEDURE — ? JUVEDERM VOLBELLA INJECTION

## 2022-11-17 NOTE — PROCEDURE: JUVEDERM VOLBELLA INJECTION
Lot #: [id number]
Post-Care Instructions: Patient instructed to apply ice to reduce swelling.
Cheeks Filler Volume In Cc: 0
Anesthesia Type: 1% lidocaine with epinephrine
Filler: Juvederm Volbella XC
Additional Area 2 Location: left earlobe line
Map Statment: See Attach Map for Complete Details
Additional Area 3 Location: jawline
Expiration Date (Month Year): 01/03/2024
Use Map Statement For Sites (Optional): No
Detail Level: Detailed
Additional Anesthesia Volume In Cc: 6
Procedural Text: The filler was administered to the treatment areas noted above.
Number Of Syringes (Required For Inventory): 1
Consent: Written consent obtained. Risks include but not limited to bruising, beading, irregular texture, ulceration, infection, allergic reaction, scar formation, incomplete augmentation, temporary nature, procedural pain.
Additional Area 1 Location: chin

## 2022-11-17 NOTE — PROCEDURE: BOTOX
Depressor Anguli Oris Units: 0
Show Additional Area 6: Yes
Lot #: [id number]
Price (Use Numbers Only, No Special Characters Or $): 0.00
Show Lcl Units: No
Additional Area 1 Location: upper lip
Dilution (U/0.1 Cc): 0.6
Glabellar Complex Units: 20
Additional Area 3 Location: masseter
Consent: Written consent obtained. Risks include but not limited to lid/brow ptosis, bruising, swelling, diplopia, temporary effect, incomplete chemical denervation.
Expiration Date (Month Year): 04/2025
Detail Level: Detailed
Periorbital Skin Units: 30
Post-Care Instructions: Patient instructed to not lie down for 4 hours and limit physical activity for 24 hours. Patient instructed not to travel by airplane for 48 hours.
Additional Area 2 Location: chin

## 2023-02-21 ENCOUNTER — APPOINTMENT (RX ONLY)
Dept: URBAN - METROPOLITAN AREA CLINIC 36 | Facility: CLINIC | Age: 68
Setting detail: DERMATOLOGY
End: 2023-02-21

## 2023-02-21 DIAGNOSIS — Z41.9 ENCOUNTER FOR PROCEDURE FOR PURPOSES OTHER THAN REMEDYING HEALTH STATE, UNSPECIFIED: ICD-10-CM

## 2023-02-21 PROCEDURE — ? BOTOX

## 2023-02-21 PROCEDURE — ? COSMETIC CONSULTATION: LASER RESURFACING

## 2023-02-21 NOTE — PROCEDURE: BOTOX
Depressor Anguli Oris Units: 0
Show Additional Area 6: Yes
Lot #: [id number]
Price (Use Numbers Only, No Special Characters Or $): 0.00
Show Lcl Units: No
Additional Area 1 Location: upper lip
Dilution (U/0.1 Cc): 0.6
Glabellar Complex Units: 20
Additional Area 3 Location: masseter
Consent: Written consent obtained. Risks include but not limited to lid/brow ptosis, bruising, swelling, diplopia, temporary effect, incomplete chemical denervation.
Expiration Date (Month Year): 09/2025
Detail Level: Detailed
Periorbital Skin Units: 30
Post-Care Instructions: Patient instructed to not lie down for 4 hours and limit physical activity for 24 hours. Patient instructed not to travel by airplane for 48 hours.
Additional Area 2 Location: chin

## 2023-03-18 ENCOUNTER — HOSPITAL ENCOUNTER (OUTPATIENT)
Dept: LAB | Facility: MEDICAL CENTER | Age: 68
End: 2023-03-18
Attending: INTERNAL MEDICINE
Payer: MEDICARE

## 2023-03-18 LAB
ALBUMIN SERPL BCP-MCNC: 4.6 G/DL (ref 3.2–4.9)
ALBUMIN/GLOB SERPL: 1.4 G/DL
ALP SERPL-CCNC: 99 U/L (ref 30–99)
ALT SERPL-CCNC: 10 U/L (ref 2–50)
ANION GAP SERPL CALC-SCNC: 13 MMOL/L (ref 7–16)
AST SERPL-CCNC: 20 U/L (ref 12–45)
BASOPHILS # BLD AUTO: 0.5 % (ref 0–1.8)
BASOPHILS # BLD: 0.03 K/UL (ref 0–0.12)
BILIRUB SERPL-MCNC: 0.6 MG/DL (ref 0.1–1.5)
BUN SERPL-MCNC: 20 MG/DL (ref 8–22)
CALCIUM ALBUM COR SERPL-MCNC: 9.6 MG/DL (ref 8.5–10.5)
CALCIUM SERPL-MCNC: 10.1 MG/DL (ref 8.5–10.5)
CHLORIDE SERPL-SCNC: 104 MMOL/L (ref 96–112)
CHOLEST SERPL-MCNC: 230 MG/DL (ref 100–199)
CO2 SERPL-SCNC: 25 MMOL/L (ref 20–33)
CREAT SERPL-MCNC: 1.07 MG/DL (ref 0.5–1.4)
EOSINOPHIL # BLD AUTO: 0.11 K/UL (ref 0–0.51)
EOSINOPHIL NFR BLD: 1.9 % (ref 0–6.9)
ERYTHROCYTE [DISTWIDTH] IN BLOOD BY AUTOMATED COUNT: 49.2 FL (ref 35.9–50)
ERYTHROCYTE [SEDIMENTATION RATE] IN BLOOD BY WESTERGREN METHOD: 11 MM/HOUR (ref 0–25)
GFR SERPLBLD CREATININE-BSD FMLA CKD-EPI: 57 ML/MIN/1.73 M 2
GLOBULIN SER CALC-MCNC: 3.3 G/DL (ref 1.9–3.5)
GLUCOSE SERPL-MCNC: 99 MG/DL (ref 65–99)
HCT VFR BLD AUTO: 44.9 % (ref 37–47)
HCV AB SER QL: NORMAL
HDLC SERPL-MCNC: 35 MG/DL
HGB BLD-MCNC: 14.6 G/DL (ref 12–16)
IMM GRANULOCYTES # BLD AUTO: 0.01 K/UL (ref 0–0.11)
IMM GRANULOCYTES NFR BLD AUTO: 0.2 % (ref 0–0.9)
LDLC SERPL CALC-MCNC: 151 MG/DL
LYMPHOCYTES # BLD AUTO: 2.33 K/UL (ref 1–4.8)
LYMPHOCYTES NFR BLD: 39.9 % (ref 22–41)
MCH RBC QN AUTO: 33.4 PG (ref 27–33)
MCHC RBC AUTO-ENTMCNC: 32.5 G/DL (ref 33.6–35)
MCV RBC AUTO: 102.7 FL (ref 81.4–97.8)
MONOCYTES # BLD AUTO: 0.47 K/UL (ref 0–0.85)
MONOCYTES NFR BLD AUTO: 8 % (ref 0–13.4)
NEUTROPHILS # BLD AUTO: 2.89 K/UL (ref 2–7.15)
NEUTROPHILS NFR BLD: 49.5 % (ref 44–72)
NRBC # BLD AUTO: 0 K/UL
NRBC BLD-RTO: 0 /100 WBC
PLATELET # BLD AUTO: 200 K/UL (ref 164–446)
PMV BLD AUTO: 11.8 FL (ref 9–12.9)
POTASSIUM SERPL-SCNC: 3.6 MMOL/L (ref 3.6–5.5)
PROT SERPL-MCNC: 7.9 G/DL (ref 6–8.2)
PTH-INTACT SERPL-MCNC: 44 PG/ML (ref 14–72)
RBC # BLD AUTO: 4.37 M/UL (ref 4.2–5.4)
SODIUM SERPL-SCNC: 142 MMOL/L (ref 135–145)
TRIGL SERPL-MCNC: 220 MG/DL (ref 0–149)
WBC # BLD AUTO: 5.8 K/UL (ref 4.8–10.8)

## 2023-03-18 PROCEDURE — 85025 COMPLETE CBC W/AUTO DIFF WBC: CPT

## 2023-03-18 PROCEDURE — 85652 RBC SED RATE AUTOMATED: CPT

## 2023-03-18 PROCEDURE — 36415 COLL VENOUS BLD VENIPUNCTURE: CPT

## 2023-03-18 PROCEDURE — 83970 ASSAY OF PARATHORMONE: CPT

## 2023-03-18 PROCEDURE — 80053 COMPREHEN METABOLIC PANEL: CPT

## 2023-03-18 PROCEDURE — 86803 HEPATITIS C AB TEST: CPT

## 2023-03-18 PROCEDURE — 80061 LIPID PANEL: CPT

## 2023-06-08 ENCOUNTER — APPOINTMENT (RX ONLY)
Dept: URBAN - METROPOLITAN AREA CLINIC 36 | Facility: CLINIC | Age: 68
Setting detail: DERMATOLOGY
End: 2023-06-08

## 2023-06-08 DIAGNOSIS — Z41.9 ENCOUNTER FOR PROCEDURE FOR PURPOSES OTHER THAN REMEDYING HEALTH STATE, UNSPECIFIED: ICD-10-CM

## 2023-06-08 PROCEDURE — ? BOTOX

## 2023-06-08 NOTE — PROCEDURE: BOTOX
Depressor Anguli Oris Units: 0
Show Additional Area 6: Yes
Lot #: [id number]
Price (Use Numbers Only, No Special Characters Or $): 0.00
Show Lcl Units: No
Additional Area 1 Location: upper lip
Dilution (U/0.1 Cc): 0.6
Glabellar Complex Units: 20
Additional Area 3 Location: masseter
Consent: Written consent obtained. Risks include but not limited to lid/brow ptosis, bruising, swelling, diplopia, temporary effect, incomplete chemical denervation.
Expiration Date (Month Year): 12/2025
Detail Level: Detailed
Periorbital Skin Units: 30
Post-Care Instructions: Patient instructed to not lie down for 4 hours and limit physical activity for 24 hours. Patient instructed not to travel by airplane for 48 hours.
Additional Area 2 Location: chin

## 2023-09-07 ENCOUNTER — HOSPITAL ENCOUNTER (OUTPATIENT)
Facility: MEDICAL CENTER | Age: 68
End: 2023-09-07
Attending: STUDENT IN AN ORGANIZED HEALTH CARE EDUCATION/TRAINING PROGRAM
Payer: MEDICARE

## 2023-09-07 PROCEDURE — 87186 SC STD MICRODIL/AGAR DIL: CPT

## 2023-09-07 PROCEDURE — 87086 URINE CULTURE/COLONY COUNT: CPT

## 2023-09-07 PROCEDURE — 87077 CULTURE AEROBIC IDENTIFY: CPT

## 2023-09-11 LAB
BACTERIA UR CULT: ABNORMAL
BACTERIA UR CULT: ABNORMAL
SIGNIFICANT IND 70042: ABNORMAL
SITE SITE: ABNORMAL
SOURCE SOURCE: ABNORMAL

## 2023-10-12 ENCOUNTER — APPOINTMENT (RX ONLY)
Dept: URBAN - METROPOLITAN AREA CLINIC 36 | Facility: CLINIC | Age: 68
Setting detail: DERMATOLOGY
End: 2023-10-12

## 2023-10-12 DIAGNOSIS — Z41.9 ENCOUNTER FOR PROCEDURE FOR PURPOSES OTHER THAN REMEDYING HEALTH STATE, UNSPECIFIED: ICD-10-CM

## 2023-10-12 PROCEDURE — ? BOTOX

## 2023-10-12 NOTE — PROCEDURE: BOTOX
[facility] Units: 0
Show Depressor Anguli Units: Yes
Expiration Date (Month Year): 05/2026
Show Ucl Units: No
Detail Level: Detailed
Lot #: [id number]
Periorbital Skin Units: 30
Consent: Written consent obtained. Risks include but not limited to lid/brow ptosis, bruising, swelling, diplopia, temporary effect, incomplete chemical denervation.
Additional Area 3 Location: masseter
Dilution (U/0.1 Cc): 0.6
Incrementing Botox Units: By 0.5 Units
Post-Care Instructions: Patient instructed to not lie down for 4 hours and limit physical activity for 24 hours. Patient instructed not to travel by airplane for 48 hours.
Additional Area 2 Location: chin
Additional Area 1 Location: upper lip
Glabellar Complex Units: 20

## 2023-12-07 ENCOUNTER — APPOINTMENT (RX ONLY)
Dept: URBAN - METROPOLITAN AREA CLINIC 36 | Facility: CLINIC | Age: 68
Setting detail: DERMATOLOGY
End: 2023-12-07

## 2023-12-07 DIAGNOSIS — Z41.9 ENCOUNTER FOR PROCEDURE FOR PURPOSES OTHER THAN REMEDYING HEALTH STATE, UNSPECIFIED: ICD-10-CM

## 2023-12-07 PROCEDURE — ? COSMETIC FOLLOW-UP

## 2023-12-07 NOTE — PROCEDURE: COSMETIC FOLLOW-UP
Price (Use Numbers Only, No Special Characters Or $): 0.00
Global Improvement: Very Good
Detail Level: Zone
Side Effects Override (Free Text): additional 4 units placed above brows

## 2024-01-18 ENCOUNTER — APPOINTMENT (RX ONLY)
Dept: URBAN - METROPOLITAN AREA CLINIC 36 | Facility: CLINIC | Age: 69
Setting detail: DERMATOLOGY
End: 2024-01-18

## 2024-01-18 DIAGNOSIS — Z41.9 ENCOUNTER FOR PROCEDURE FOR PURPOSES OTHER THAN REMEDYING HEALTH STATE, UNSPECIFIED: ICD-10-CM

## 2024-01-18 PROCEDURE — ? BOTOX

## 2024-01-18 NOTE — PROCEDURE: BOTOX
Inferior Lateral Orbicularis Oculi Units: 0
Show Depressor Anguli Units: Yes
Show Ucl Units: No
Additional Area 2 Location: chin
Post-Care Instructions: Patient instructed to not lie down for 4 hours and limit physical activity for 24 hours. Patient instructed not to travel by airplane for 48 hours.
Glabellar Complex Units: 20
Dilution (U/0.1 Cc): 0.6
Consent: Written consent obtained. Risks include but not limited to lid/brow ptosis, bruising, swelling, diplopia, temporary effect, incomplete chemical denervation.
Additional Area 1 Location: upper lip
Incrementing Botox Units: By 0.5 Units
Lot #: [id number]
Detail Level: Detailed
Expiration Date (Month Year): 07/2026
Periorbital Skin Units: 30
Additional Area 3 Location: masseter

## 2024-04-04 ENCOUNTER — APPOINTMENT (RX ONLY)
Dept: URBAN - METROPOLITAN AREA CLINIC 36 | Facility: CLINIC | Age: 69
Setting detail: DERMATOLOGY
End: 2024-04-04

## 2024-04-04 DIAGNOSIS — Z41.9 ENCOUNTER FOR PROCEDURE FOR PURPOSES OTHER THAN REMEDYING HEALTH STATE, UNSPECIFIED: ICD-10-CM

## 2024-04-04 PROCEDURE — ? BOTOX

## 2024-04-04 NOTE — PROCEDURE: BOTOX
L Brow Units: 0
Show Additional Area 6: Yes
Detail Level: Detailed
Show Mentalis Units: No
Periorbital Skin Units: 30
Additional Area 3 Location: masseter
Incrementing Botox Units: By 0.5 Units
Expiration Date (Month Year): 09/2026
Additional Area 2 Location: chin
Lot #: [id number]
Glabellar Complex Units: 20
Additional Area 1 Location: upper lip
Consent: Written consent obtained. Risks include but not limited to lid/brow ptosis, bruising, swelling, diplopia, temporary effect, incomplete chemical denervation.
Post-Care Instructions: Patient instructed to not lie down for 4 hours and limit physical activity for 24 hours. Patient instructed not to travel by airplane for 48 hours.
Dilution (U/0.1 Cc): 0.1

## 2024-05-29 ENCOUNTER — HOSPITAL ENCOUNTER (OUTPATIENT)
Dept: LAB | Facility: MEDICAL CENTER | Age: 69
End: 2024-05-29
Attending: INTERNAL MEDICINE
Payer: MEDICARE

## 2024-05-30 LAB
25(OH)D3 SERPL-MCNC: 65 NG/ML (ref 30–100)
ALBUMIN SERPL BCP-MCNC: 5 G/DL (ref 3.2–4.9)
ALBUMIN/GLOB SERPL: 1.6 G/DL
ALP SERPL-CCNC: 103 U/L (ref 30–99)
ALT SERPL-CCNC: 15 U/L (ref 2–50)
ANION GAP SERPL CALC-SCNC: 17 MMOL/L (ref 7–16)
AST SERPL-CCNC: 24 U/L (ref 12–45)
BASOPHILS # BLD AUTO: 0.6 % (ref 0–1.8)
BASOPHILS # BLD: 0.05 K/UL (ref 0–0.12)
BILIRUB SERPL-MCNC: 0.5 MG/DL (ref 0.1–1.5)
BUN SERPL-MCNC: 28 MG/DL (ref 8–22)
CALCIUM ALBUM COR SERPL-MCNC: 9.1 MG/DL (ref 8.5–10.5)
CALCIUM SERPL-MCNC: 9.9 MG/DL (ref 8.5–10.5)
CHLORIDE SERPL-SCNC: 101 MMOL/L (ref 96–112)
CO2 SERPL-SCNC: 23 MMOL/L (ref 20–33)
CREAT SERPL-MCNC: 0.9 MG/DL (ref 0.5–1.4)
EOSINOPHIL # BLD AUTO: 0.03 K/UL (ref 0–0.51)
EOSINOPHIL NFR BLD: 0.4 % (ref 0–6.9)
ERYTHROCYTE [DISTWIDTH] IN BLOOD BY AUTOMATED COUNT: 45.7 FL (ref 35.9–50)
EST. AVERAGE GLUCOSE BLD GHB EST-MCNC: 120 MG/DL
GFR SERPLBLD CREATININE-BSD FMLA CKD-EPI: 69 ML/MIN/1.73 M 2
GLOBULIN SER CALC-MCNC: 3.1 G/DL (ref 1.9–3.5)
GLUCOSE SERPL-MCNC: 111 MG/DL (ref 65–99)
HBA1C MFR BLD: 5.8 % (ref 4–5.6)
HCT VFR BLD AUTO: 41.7 % (ref 37–47)
HGB BLD-MCNC: 14.1 G/DL (ref 12–16)
IMM GRANULOCYTES # BLD AUTO: 0.02 K/UL (ref 0–0.11)
IMM GRANULOCYTES NFR BLD AUTO: 0.2 % (ref 0–0.9)
LYMPHOCYTES # BLD AUTO: 2.67 K/UL (ref 1–4.8)
LYMPHOCYTES NFR BLD: 31.2 % (ref 22–41)
MCH RBC QN AUTO: 34 PG (ref 27–33)
MCHC RBC AUTO-ENTMCNC: 33.8 G/DL (ref 32.2–35.5)
MCV RBC AUTO: 100.5 FL (ref 81.4–97.8)
MONOCYTES # BLD AUTO: 0.63 K/UL (ref 0–0.85)
MONOCYTES NFR BLD AUTO: 7.4 % (ref 0–13.4)
NEUTROPHILS # BLD AUTO: 5.15 K/UL (ref 1.82–7.42)
NEUTROPHILS NFR BLD: 60.2 % (ref 44–72)
NRBC # BLD AUTO: 0 K/UL
NRBC BLD-RTO: 0 /100 WBC (ref 0–0.2)
PLATELET # BLD AUTO: 205 K/UL (ref 164–446)
PMV BLD AUTO: 11.4 FL (ref 9–12.9)
POTASSIUM SERPL-SCNC: 3.9 MMOL/L (ref 3.6–5.5)
PROT SERPL-MCNC: 8.1 G/DL (ref 6–8.2)
PTH-INTACT SERPL-MCNC: 45.1 PG/ML (ref 14–72)
RBC # BLD AUTO: 4.15 M/UL (ref 4.2–5.4)
SODIUM SERPL-SCNC: 141 MMOL/L (ref 135–145)
TSH SERPL DL<=0.005 MIU/L-ACNC: 1.53 UIU/ML (ref 0.38–5.33)
WBC # BLD AUTO: 8.6 K/UL (ref 4.8–10.8)

## 2024-06-27 ENCOUNTER — APPOINTMENT (RX ONLY)
Dept: URBAN - METROPOLITAN AREA CLINIC 36 | Facility: CLINIC | Age: 69
Setting detail: DERMATOLOGY
End: 2024-06-27

## 2024-06-27 DIAGNOSIS — Z41.9 ENCOUNTER FOR PROCEDURE FOR PURPOSES OTHER THAN REMEDYING HEALTH STATE, UNSPECIFIED: ICD-10-CM

## 2024-06-27 PROCEDURE — ? BOTOX

## 2024-06-27 NOTE — PROCEDURE: BOTOX
Left Pupillary Line Units: 0
Show Nasal Units: Yes
Show Mentalis Units: No
Detail Level: Detailed
Expiration Date (Month Year): 09/2026
Glabellar Complex Units: 20
Lot #: [id number]
Additional Area 3 Location: masseter
Consent: Written consent obtained. Risks include but not limited to lid/brow ptosis, bruising, swelling, diplopia, temporary effect, incomplete chemical denervation.
Dilution (U/0.1 Cc): 0.1
Periorbital Skin Units: 30
Additional Area 2 Location: chin
Post-Care Instructions: Patient instructed to not lie down for 4 hours and limit physical activity for 24 hours. Patient instructed not to travel by airplane for 48 hours.
Incrementing Botox Units: By 0.5 Units
Additional Area 1 Location: upper lip

## 2024-08-16 ENCOUNTER — HOSPITAL ENCOUNTER (OUTPATIENT)
Dept: LAB | Facility: MEDICAL CENTER | Age: 69
End: 2024-08-16
Attending: INTERNAL MEDICINE
Payer: COMMERCIAL

## 2024-08-16 LAB
CRP SERPL HS-MCNC: <0.3 MG/DL (ref 0–0.75)
ERYTHROCYTE [SEDIMENTATION RATE] IN BLOOD BY WESTERGREN METHOD: 10 MM/HOUR (ref 0–25)
T3FREE SERPL-MCNC: 2.64 PG/ML (ref 2–4.4)
T4 FREE SERPL-MCNC: 1.08 NG/DL (ref 0.93–1.7)
VIT B12 SERPL-MCNC: >4000 PG/ML (ref 211–911)

## 2024-08-16 PROCEDURE — 86140 C-REACTIVE PROTEIN: CPT

## 2024-08-16 PROCEDURE — 84481 FREE ASSAY (FT-3): CPT

## 2024-08-16 PROCEDURE — 86038 ANTINUCLEAR ANTIBODIES: CPT

## 2024-08-16 PROCEDURE — 36415 COLL VENOUS BLD VENIPUNCTURE: CPT

## 2024-08-16 PROCEDURE — 84207 ASSAY OF VITAMIN B-6: CPT

## 2024-08-16 PROCEDURE — 82607 VITAMIN B-12: CPT

## 2024-08-16 PROCEDURE — 84439 ASSAY OF FREE THYROXINE: CPT

## 2024-08-16 PROCEDURE — 85652 RBC SED RATE AUTOMATED: CPT

## 2024-08-19 LAB — NUCLEAR IGG SER QL IA: NORMAL

## 2024-08-21 LAB — VIT B6 SERPL-MCNC: 135.6 NMOL/L (ref 20–125)

## 2024-09-05 ENCOUNTER — APPOINTMENT (RX ONLY)
Dept: URBAN - METROPOLITAN AREA CLINIC 20 | Facility: CLINIC | Age: 69
Setting detail: DERMATOLOGY
End: 2024-09-05

## 2024-09-05 DIAGNOSIS — Z41.9 ENCOUNTER FOR PROCEDURE FOR PURPOSES OTHER THAN REMEDYING HEALTH STATE, UNSPECIFIED: ICD-10-CM

## 2024-09-05 PROCEDURE — ? BOTOX

## 2024-09-05 NOTE — PROCEDURE: BOTOX
R Brow Units: 0
Glabellar Complex Units: 20
Post-Care Instructions: Patient instructed to not lie down for 4 hours and limit physical activity for 24 hours. Patient instructed not to travel by airplane for 48 hours.
Show Lateral Platysmal Band Units: Yes
Additional Area 3 Location: masseter
Expiration Date (Month Year): 09/2026
Show Right And Left Pupillary Line Units: No
Lot #: [id number]
Additional Area 2 Location: chin
Periorbital Skin Units: 30
Incrementing Botox Units: By 0.5 Units
Dilution (U/0.1 Cc): 0.1
Detail Level: Detailed
Consent: Written consent obtained. Risks include but not limited to lid/brow ptosis, bruising, swelling, diplopia, temporary effect, incomplete chemical denervation.
Additional Area 1 Location: upper lip

## 2024-11-21 ENCOUNTER — APPOINTMENT (RX ONLY)
Dept: URBAN - METROPOLITAN AREA CLINIC 20 | Facility: CLINIC | Age: 69
Setting detail: DERMATOLOGY
End: 2024-11-21

## 2024-11-21 DIAGNOSIS — Z41.9 ENCOUNTER FOR PROCEDURE FOR PURPOSES OTHER THAN REMEDYING HEALTH STATE, UNSPECIFIED: ICD-10-CM

## 2024-11-21 PROCEDURE — ? BOTOX

## 2024-11-21 NOTE — PROCEDURE: BOTOX
Left Pupillary Line Units: 0
Show Nasal Units: Yes
Additional Area 2 Location: chin
Show Right And Left Brow Units: No
Dilution (U/0.1 Cc): 0.1
Additional Area 1 Location: upper lip
Consent: Written consent obtained. Risks include but not limited to lid/brow ptosis, bruising, swelling, diplopia, temporary effect, incomplete chemical denervation.
Glabellar Complex Units: 25
Detail Level: Detailed
Incrementing Botox Units: By 0.5 Units
Post-Care Instructions: Patient instructed to not lie down for 4 hours and limit physical activity for 24 hours. Patient instructed not to travel by airplane for 48 hours.
Expiration Date (Month Year): 10/2026
Lot #: [id number]
Additional Area 3 Location: masseter

## 2025-03-13 ENCOUNTER — APPOINTMENT (OUTPATIENT)
Dept: URBAN - METROPOLITAN AREA CLINIC 20 | Facility: CLINIC | Age: 70
Setting detail: DERMATOLOGY
End: 2025-03-13

## 2025-03-13 DIAGNOSIS — Z41.9 ENCOUNTER FOR PROCEDURE FOR PURPOSES OTHER THAN REMEDYING HEALTH STATE, UNSPECIFIED: ICD-10-CM

## 2025-03-13 PROCEDURE — ? BOTOX

## 2025-03-13 NOTE — PROCEDURE: BOTOX
Left Pupillary Line Units: 0
Show Nasal Units: Yes
Additional Area 2 Location: chin
Show Right And Left Brow Units: No
Dilution (U/0.1 Cc): 0.1
Additional Area 1 Location: upper lip
Consent: Written consent obtained. Risks include but not limited to lid/brow ptosis, bruising, swelling, diplopia, temporary effect, incomplete chemical denervation.
Glabellar Complex Units: 25
Detail Level: Detailed
Incrementing Botox Units: By 0.5 Units
Post-Care Instructions: Patient instructed to not lie down for 4 hours and limit physical activity for 24 hours. Patient instructed not to travel by airplane for 48 hours.
Expiration Date (Month Year): 02/2027
Lot #: [id number]
Additional Area 3 Location: masseter

## 2025-06-05 ENCOUNTER — APPOINTMENT (OUTPATIENT)
Dept: URBAN - METROPOLITAN AREA CLINIC 20 | Facility: CLINIC | Age: 70
Setting detail: DERMATOLOGY
End: 2025-06-05

## 2025-06-05 DIAGNOSIS — Z41.9 ENCOUNTER FOR PROCEDURE FOR PURPOSES OTHER THAN REMEDYING HEALTH STATE, UNSPECIFIED: ICD-10-CM

## 2025-06-05 PROCEDURE — ? BOTOX

## (undated) DEVICE — HEAD HOLDER JUNIOR/ADULT

## (undated) DEVICE — GLOVE BIOGEL PI INDICATOR SZ 7.0 SURGICAL PF LF - (50/BX 4BX/CA)

## (undated) DEVICE — MASK ANESTHESIA ADULT  - (100/CA)

## (undated) DEVICE — SUTURE GENERAL

## (undated) DEVICE — KIT ROOM DECONTAMINATION

## (undated) DEVICE — SET SUCTION/IRRIGATION WITH DISPOSABLE TIP (6/CA )PART #0250-070-520 IS A SUB

## (undated) DEVICE — SET LEADWIRE 5 LEAD BEDSIDE DISPOSABLE ECG (1SET OF 5/EA)

## (undated) DEVICE — PAD OR TABLE DA VINCI 2IN X 20IN X 72IN - (12EA/CA)

## (undated) DEVICE — TROCAR 5X100 NON BLADED Z-TH - READ KII (6/BX)

## (undated) DEVICE — CHLORAPREP 26 ML APPLICATOR - ORANGE TINT(25/CA)

## (undated) DEVICE — Device

## (undated) DEVICE — SUTURE 4-0 MONOCRYL PLUSPC-3 - 18 INCH (12/BX)

## (undated) DEVICE — SUTURE 0 COATED VICRYL 6-18IN - (12PK/BX)

## (undated) DEVICE — SUTURE 2-0 ETHILON FS - (36/BX) 18 INCH

## (undated) DEVICE — DEVICE SUTURING NON-SAFETY ENDO CLOSE (12/BX)

## (undated) DEVICE — ROBOTIC SURGERY SERVICES

## (undated) DEVICE — GLOVE SZ 6.5 BIOGEL PI MICRO - PF LF (50PR/BX)

## (undated) DEVICE — SPONGE DRAIN 4 X 4IN 6-PLY - (2/PK25PK/BX12BX/CS)

## (undated) DEVICE — ELECTRODE DUAL RETURN W/ CORD - (50/PK)

## (undated) DEVICE — REDUCER XI STAPLER 12MM TO 8MM (6EA/BX)

## (undated) DEVICE — WATER IRRIG. STER. 1500 ML - (9/CA)

## (undated) DEVICE — RESERVOIR SUCTION 100 CC - SILICONE (20EA/CA)

## (undated) DEVICE — TRAY CATHETER FOLEY URINE METER W/STATLOCK 350ML (10EA/CA)

## (undated) DEVICE — GLOVE BIOGEL SZ 7 SURGICAL PF LTX - (50PR/BX 4BX/CA)

## (undated) DEVICE — GOWN WARMING STANDARD FLEX - (30/CA)

## (undated) DEVICE — GLOVE BIOGEL INDICATOR SZ 7SURGICAL PF LTX - (50/BX 4BX/CA)

## (undated) DEVICE — DRAPE ARM  BOX OF 20

## (undated) DEVICE — KIT ANESTHESIA W/CIRCUIT & 3/LT BAG W/FILTER (20EA/CA)

## (undated) DEVICE — SEALER VESSEL EXTEND FROM DAVINCI ENERGY

## (undated) DEVICE — COVER TIP ENDOWRIST HOT SHEAR - (10EA/BX) DA VINCI

## (undated) DEVICE — GLOVE BIOGEL PI INDICATOR SZ 6.5 SURGICAL PF LF - (50/BX 4BX/CA)

## (undated) DEVICE — OBTURATOR BLADELESS STANDARD 8MM (6EA/BX)

## (undated) DEVICE — ELECTRODE 850 FOAM ADHESIVE - HYDROGEL RADIOTRNSPRNT (50/PK)

## (undated) DEVICE — SUTURE NABSB 2-0 KS 30IN PRLN BLUE (36PK/BX)

## (undated) DEVICE — SLEEVE, VASO, THIGH, MED

## (undated) DEVICE — SUTURE 3-0 VICRYL PLUS SH - 27 INCH (36/BX)

## (undated) DEVICE — SUTURE 0 PDS CT-2 (36PK/BX)

## (undated) DEVICE — SUCTION INSTRUMENT YANKAUER BULBOUS TIP W/O VENT (50EA/CA)

## (undated) DEVICE — TUBE E-T HI-LO CUFF 6.5MM (10EA/BX)

## (undated) DEVICE — SEAL CANNULA STAPLER 12 MM (10EA/BX)

## (undated) DEVICE — DRAPE COLUMN  BOX OF 20

## (undated) DEVICE — SET EXTENSION WITH 2 PORTS (48EA/CA) ***PART #2C8610 IS A SUBSTITUTE*****

## (undated) DEVICE — CANISTER SUCTION 3000ML MECHANICAL FILTER AUTO SHUTOFF MEDI-VAC NONSTERILE LF DISP  (40EA/CA)

## (undated) DEVICE — TROCAR Z THREAD12MM OPTICAL - NON BLADED (6/BX)

## (undated) DEVICE — SEAL 5MM-8MM UNIVERSAL  BOX OF 10

## (undated) DEVICE — PROTECTOR ULNA NERVE - (36PR/CA)

## (undated) DEVICE — LACTATED RINGERS INJ 1000 ML - (14EA/CA 60CA/PF)

## (undated) DEVICE — TUBING CLEARLINK DUO-VENT - C-FLO (48EA/CA)

## (undated) DEVICE — NEPTUNE 4 PORT MANIFOLD - (20/PK)

## (undated) DEVICE — SENSOR SPO2 NEO LNCS ADHESIVE (20/BX) SEE USER NOTES